# Patient Record
Sex: FEMALE | Race: WHITE | NOT HISPANIC OR LATINO | Employment: UNEMPLOYED | URBAN - METROPOLITAN AREA
[De-identification: names, ages, dates, MRNs, and addresses within clinical notes are randomized per-mention and may not be internally consistent; named-entity substitution may affect disease eponyms.]

---

## 2017-01-01 ENCOUNTER — PATIENT OUTREACH (OUTPATIENT)
Dept: HEALTH INFORMATION MANAGEMENT | Facility: OTHER | Age: 52
End: 2017-01-01

## 2017-01-01 ENCOUNTER — APPOINTMENT (OUTPATIENT)
Dept: RADIOLOGY | Facility: MEDICAL CENTER | Age: 52
DRG: 897 | End: 2017-01-01
Attending: EMERGENCY MEDICINE
Payer: MEDICAID

## 2017-01-01 ENCOUNTER — APPOINTMENT (OUTPATIENT)
Dept: RADIOLOGY | Facility: MEDICAL CENTER | Age: 52
DRG: 897 | End: 2017-01-01
Attending: HOSPITALIST
Payer: MEDICAID

## 2017-01-01 ENCOUNTER — HOSPITAL ENCOUNTER (INPATIENT)
Facility: MEDICAL CENTER | Age: 52
LOS: 3 days | DRG: 897 | End: 2017-11-19
Attending: EMERGENCY MEDICINE | Admitting: HOSPITALIST
Payer: MEDICAID

## 2017-01-01 ENCOUNTER — RESOLUTE PROFESSIONAL BILLING HOSPITAL PROF FEE (OUTPATIENT)
Dept: HOSPITALIST | Facility: MEDICAL CENTER | Age: 52
End: 2017-01-01
Payer: MEDICAID

## 2017-01-01 VITALS
RESPIRATION RATE: 18 BRPM | BODY MASS INDEX: 21.79 KG/M2 | WEIGHT: 127.65 LBS | OXYGEN SATURATION: 95 % | HEIGHT: 64 IN | HEART RATE: 90 BPM | TEMPERATURE: 97.9 F | DIASTOLIC BLOOD PRESSURE: 66 MMHG | SYSTOLIC BLOOD PRESSURE: 108 MMHG

## 2017-01-01 LAB
ABO GROUP BLD: NORMAL
ALBUMIN SERPL BCP-MCNC: 2.5 G/DL (ref 3.2–4.9)
ALBUMIN SERPL BCP-MCNC: 2.7 G/DL (ref 3.2–4.9)
ALBUMIN SERPL BCP-MCNC: 3.4 G/DL (ref 3.2–4.9)
ALBUMIN/GLOB SERPL: 0.9 G/DL
ALBUMIN/GLOB SERPL: 1 G/DL
ALBUMIN/GLOB SERPL: 1 G/DL
ALP SERPL-CCNC: 190 U/L (ref 30–99)
ALP SERPL-CCNC: 200 U/L (ref 30–99)
ALP SERPL-CCNC: 228 U/L (ref 30–99)
ALT SERPL-CCNC: 13 U/L (ref 2–50)
ALT SERPL-CCNC: 15 U/L (ref 2–50)
ALT SERPL-CCNC: 20 U/L (ref 2–50)
AMMONIA PLAS-SCNC: 49 UMOL/L (ref 11–45)
ANION GAP SERPL CALC-SCNC: 16 MMOL/L (ref 0–11.9)
ANION GAP SERPL CALC-SCNC: 24 MMOL/L (ref 0–11.9)
ANION GAP SERPL CALC-SCNC: 8 MMOL/L (ref 0–11.9)
ANION GAP SERPL CALC-SCNC: 9 MMOL/L (ref 0–11.9)
ANISOCYTOSIS BLD QL SMEAR: ABNORMAL
APTT PPP: 37.9 SEC (ref 24.7–36)
AST SERPL-CCNC: 70 U/L (ref 12–45)
AST SERPL-CCNC: 81 U/L (ref 12–45)
AST SERPL-CCNC: 93 U/L (ref 12–45)
BASOPHILS # BLD AUTO: 0.4 % (ref 0–1.8)
BASOPHILS # BLD AUTO: 0.5 % (ref 0–1.8)
BASOPHILS # BLD AUTO: 0.6 % (ref 0–1.8)
BASOPHILS # BLD AUTO: 1.7 % (ref 0–1.8)
BASOPHILS # BLD: 0.03 K/UL (ref 0–0.12)
BASOPHILS # BLD: 0.04 K/UL (ref 0–0.12)
BASOPHILS # BLD: 0.05 K/UL (ref 0–0.12)
BASOPHILS # BLD: 0.1 K/UL (ref 0–0.12)
BILIRUB SERPL-MCNC: 2.6 MG/DL (ref 0.1–1.5)
BILIRUB SERPL-MCNC: 2.9 MG/DL (ref 0.1–1.5)
BILIRUB SERPL-MCNC: 3.5 MG/DL (ref 0.1–1.5)
BLD GP AB SCN SERPL QL: NORMAL
BUN SERPL-MCNC: <3 MG/DL (ref 8–22)
CALCIUM SERPL-MCNC: 6.5 MG/DL (ref 8.5–10.5)
CALCIUM SERPL-MCNC: 7.3 MG/DL (ref 8.5–10.5)
CALCIUM SERPL-MCNC: 7.5 MG/DL (ref 8.5–10.5)
CALCIUM SERPL-MCNC: 8.5 MG/DL (ref 8.5–10.5)
CHLORIDE SERPL-SCNC: 100 MMOL/L (ref 96–112)
CHLORIDE SERPL-SCNC: 106 MMOL/L (ref 96–112)
CHLORIDE SERPL-SCNC: 89 MMOL/L (ref 96–112)
CHLORIDE SERPL-SCNC: 97 MMOL/L (ref 96–112)
CO2 SERPL-SCNC: 22 MMOL/L (ref 20–33)
CO2 SERPL-SCNC: 23 MMOL/L (ref 20–33)
CO2 SERPL-SCNC: 25 MMOL/L (ref 20–33)
CO2 SERPL-SCNC: 25 MMOL/L (ref 20–33)
CREAT SERPL-MCNC: 0.27 MG/DL (ref 0.5–1.4)
CREAT SERPL-MCNC: 0.29 MG/DL (ref 0.5–1.4)
CREAT SERPL-MCNC: 0.33 MG/DL (ref 0.5–1.4)
CREAT SERPL-MCNC: 0.44 MG/DL (ref 0.5–1.4)
EKG IMPRESSION: NORMAL
EOSINOPHIL # BLD AUTO: 0.03 K/UL (ref 0–0.51)
EOSINOPHIL # BLD AUTO: 0.04 K/UL (ref 0–0.51)
EOSINOPHIL # BLD AUTO: 0.04 K/UL (ref 0–0.51)
EOSINOPHIL # BLD AUTO: 0.05 K/UL (ref 0–0.51)
EOSINOPHIL NFR BLD: 0.4 % (ref 0–6.9)
EOSINOPHIL NFR BLD: 0.4 % (ref 0–6.9)
EOSINOPHIL NFR BLD: 0.6 % (ref 0–6.9)
EOSINOPHIL NFR BLD: 0.9 % (ref 0–6.9)
ERYTHROCYTE [DISTWIDTH] IN BLOOD BY AUTOMATED COUNT: 62.8 FL (ref 35.9–50)
ERYTHROCYTE [DISTWIDTH] IN BLOOD BY AUTOMATED COUNT: 63.4 FL (ref 35.9–50)
ERYTHROCYTE [DISTWIDTH] IN BLOOD BY AUTOMATED COUNT: 65.3 FL (ref 35.9–50)
ERYTHROCYTE [DISTWIDTH] IN BLOOD BY AUTOMATED COUNT: 67 FL (ref 35.9–50)
ETHANOL BLD-MCNC: 0 G/DL
FERRITIN SERPL-MCNC: >1500 NG/ML (ref 10–291)
FOLATE SERPL-MCNC: 3.7 NG/ML
GFR SERPL CREATININE-BSD FRML MDRD: >60 ML/MIN/1.73 M 2
GLOBULIN SER CALC-MCNC: 2.6 G/DL (ref 1.9–3.5)
GLOBULIN SER CALC-MCNC: 2.9 G/DL (ref 1.9–3.5)
GLOBULIN SER CALC-MCNC: 3.3 G/DL (ref 1.9–3.5)
GLUCOSE SERPL-MCNC: 101 MG/DL (ref 65–99)
GLUCOSE SERPL-MCNC: 110 MG/DL (ref 65–99)
GLUCOSE SERPL-MCNC: 96 MG/DL (ref 65–99)
GLUCOSE SERPL-MCNC: 98 MG/DL (ref 65–99)
HAV IGM SERPL QL IA: NEGATIVE
HBV CORE IGM SER QL: NEGATIVE
HBV SURFACE AG SER QL: NEGATIVE
HCT VFR BLD AUTO: 23 % (ref 37–47)
HCT VFR BLD AUTO: 23.8 % (ref 37–47)
HCT VFR BLD AUTO: 24.6 % (ref 37–47)
HCT VFR BLD AUTO: 27.2 % (ref 37–47)
HCV AB SER QL: NEGATIVE
HEMOCCULT SP1 STL QL: NEGATIVE
HEMOCCULT SP2 STL QL: NEGATIVE
HGB BLD-MCNC: 8.2 G/DL (ref 12–16)
HGB BLD-MCNC: 8.5 G/DL (ref 12–16)
HGB BLD-MCNC: 8.8 G/DL (ref 12–16)
HGB BLD-MCNC: 9.5 G/DL (ref 12–16)
HYPOCHROMIA BLD QL SMEAR: ABNORMAL
IMM GRANULOCYTES # BLD AUTO: 0.05 K/UL (ref 0–0.11)
IMM GRANULOCYTES # BLD AUTO: 0.06 K/UL (ref 0–0.11)
IMM GRANULOCYTES # BLD AUTO: 0.08 K/UL (ref 0–0.11)
IMM GRANULOCYTES NFR BLD AUTO: 0.7 % (ref 0–0.9)
IMM GRANULOCYTES NFR BLD AUTO: 0.9 % (ref 0–0.9)
IMM GRANULOCYTES NFR BLD AUTO: 0.9 % (ref 0–0.9)
INR PPP: 1.38 (ref 0.87–1.13)
INR PPP: 1.42 (ref 0.87–1.13)
IRON SATN MFR SERPL: 85 % (ref 15–55)
IRON SERPL-MCNC: 122 UG/DL (ref 40–170)
LG PLATELETS BLD QL SMEAR: NORMAL
LIPASE SERPL-CCNC: 108 U/L (ref 11–82)
LIPASE SERPL-CCNC: 89 U/L (ref 11–82)
LYMPHOCYTES # BLD AUTO: 0.44 K/UL (ref 1–4.8)
LYMPHOCYTES # BLD AUTO: 0.5 K/UL (ref 1–4.8)
LYMPHOCYTES # BLD AUTO: 0.52 K/UL (ref 1–4.8)
LYMPHOCYTES # BLD AUTO: 0.66 K/UL (ref 1–4.8)
LYMPHOCYTES NFR BLD: 5.4 % (ref 22–41)
LYMPHOCYTES NFR BLD: 6.2 % (ref 22–41)
LYMPHOCYTES NFR BLD: 8.7 % (ref 22–41)
LYMPHOCYTES NFR BLD: 9.7 % (ref 22–41)
MACROCYTES BLD QL SMEAR: ABNORMAL
MAGNESIUM SERPL-MCNC: 1 MG/DL (ref 1.5–2.5)
MAGNESIUM SERPL-MCNC: 1.7 MG/DL (ref 1.5–2.5)
MANUAL DIFF BLD: NORMAL
MCH RBC QN AUTO: 36.3 PG (ref 27–33)
MCH RBC QN AUTO: 36.4 PG (ref 27–33)
MCH RBC QN AUTO: 36.6 PG (ref 27–33)
MCH RBC QN AUTO: 36.7 PG (ref 27–33)
MCHC RBC AUTO-ENTMCNC: 34.9 G/DL (ref 33.6–35)
MCHC RBC AUTO-ENTMCNC: 35.7 G/DL (ref 33.6–35)
MCHC RBC AUTO-ENTMCNC: 35.7 G/DL (ref 33.6–35)
MCHC RBC AUTO-ENTMCNC: 35.8 G/DL (ref 33.6–35)
MCV RBC AUTO: 101.7 FL (ref 81.4–97.8)
MCV RBC AUTO: 102.5 FL (ref 81.4–97.8)
MCV RBC AUTO: 102.7 FL (ref 81.4–97.8)
MCV RBC AUTO: 104.2 FL (ref 81.4–97.8)
MONOCYTES # BLD AUTO: 0 K/UL (ref 0–0.85)
MONOCYTES # BLD AUTO: 0.36 K/UL (ref 0–0.85)
MONOCYTES # BLD AUTO: 0.48 K/UL (ref 0–0.85)
MONOCYTES # BLD AUTO: 0.5 K/UL (ref 0–0.85)
MONOCYTES NFR BLD AUTO: 0 % (ref 0–13.4)
MONOCYTES NFR BLD AUTO: 5.1 % (ref 0–13.4)
MONOCYTES NFR BLD AUTO: 5.4 % (ref 0–13.4)
MONOCYTES NFR BLD AUTO: 7 % (ref 0–13.4)
MORPHOLOGY BLD-IMP: NORMAL
NEUTROPHILS # BLD AUTO: 5.32 K/UL (ref 2–7.15)
NEUTROPHILS # BLD AUTO: 5.55 K/UL (ref 2–7.15)
NEUTROPHILS # BLD AUTO: 6.17 K/UL (ref 2–7.15)
NEUTROPHILS # BLD AUTO: 8.13 K/UL (ref 2–7.15)
NEUTROPHILS NFR BLD: 79.1 % (ref 44–72)
NEUTROPHILS NFR BLD: 81.2 % (ref 44–72)
NEUTROPHILS NFR BLD: 87.2 % (ref 44–72)
NEUTROPHILS NFR BLD: 87.4 % (ref 44–72)
NEUTS BAND NFR BLD MANUAL: 9.6 % (ref 0–10)
NRBC # BLD AUTO: 0 K/UL
NRBC BLD AUTO-RTO: 0 /100 WBC
PHOSPHATE SERPL-MCNC: <1 MG/DL (ref 2.5–4.5)
PLATELET # BLD AUTO: 111 K/UL (ref 164–446)
PLATELET # BLD AUTO: 117 K/UL (ref 164–446)
PLATELET # BLD AUTO: 128 K/UL (ref 164–446)
PLATELET # BLD AUTO: 144 K/UL (ref 164–446)
PLATELET BLD QL SMEAR: NORMAL
PMV BLD AUTO: 10.1 FL (ref 9–12.9)
PMV BLD AUTO: 10.4 FL (ref 9–12.9)
PMV BLD AUTO: 10.5 FL (ref 9–12.9)
PMV BLD AUTO: 10.7 FL (ref 9–12.9)
POIKILOCYTOSIS BLD QL SMEAR: NORMAL
POTASSIUM SERPL-SCNC: 2.5 MMOL/L (ref 3.6–5.5)
POTASSIUM SERPL-SCNC: 2.6 MMOL/L (ref 3.6–5.5)
POTASSIUM SERPL-SCNC: 3 MMOL/L (ref 3.6–5.5)
POTASSIUM SERPL-SCNC: 4 MMOL/L (ref 3.6–5.5)
PROT SERPL-MCNC: 5.1 G/DL (ref 6–8.2)
PROT SERPL-MCNC: 5.6 G/DL (ref 6–8.2)
PROT SERPL-MCNC: 6.7 G/DL (ref 6–8.2)
PROTHROMBIN TIME: 16.7 SEC (ref 12–14.6)
PROTHROMBIN TIME: 17 SEC (ref 12–14.6)
RBC # BLD AUTO: 2.24 M/UL (ref 4.2–5.4)
RBC # BLD AUTO: 2.34 M/UL (ref 4.2–5.4)
RBC # BLD AUTO: 2.4 M/UL (ref 4.2–5.4)
RBC # BLD AUTO: 2.61 M/UL (ref 4.2–5.4)
RBC BLD AUTO: PRESENT
RH BLD: NORMAL
SODIUM SERPL-SCNC: 134 MMOL/L (ref 135–145)
SODIUM SERPL-SCNC: 136 MMOL/L (ref 135–145)
SODIUM SERPL-SCNC: 136 MMOL/L (ref 135–145)
SODIUM SERPL-SCNC: 138 MMOL/L (ref 135–145)
TARGETS BLD QL SMEAR: NORMAL
TIBC SERPL-MCNC: 144 UG/DL (ref 250–450)
TOXIC GRANULES BLD QL SMEAR: SLIGHT
VIT B1 BLD-MCNC: 46 NMOL/L (ref 70–180)
VIT B12 SERPL-MCNC: 762 PG/ML (ref 211–911)
WBC # BLD AUTO: 6 K/UL (ref 4.8–10.8)
WBC # BLD AUTO: 6.8 K/UL (ref 4.8–10.8)
WBC # BLD AUTO: 7.1 K/UL (ref 4.8–10.8)
WBC # BLD AUTO: 9.3 K/UL (ref 4.8–10.8)

## 2017-01-01 PROCEDURE — 80053 COMPREHEN METABOLIC PANEL: CPT

## 2017-01-01 PROCEDURE — A9270 NON-COVERED ITEM OR SERVICE: HCPCS | Performed by: HOSPITALIST

## 2017-01-01 PROCEDURE — 700102 HCHG RX REV CODE 250 W/ 637 OVERRIDE(OP): Performed by: HOSPITALIST

## 2017-01-01 PROCEDURE — 700105 HCHG RX REV CODE 258: Performed by: HOSPITALIST

## 2017-01-01 PROCEDURE — 700105 HCHG RX REV CODE 258: Performed by: EMERGENCY MEDICINE

## 2017-01-01 PROCEDURE — 80048 BASIC METABOLIC PNL TOTAL CA: CPT

## 2017-01-01 PROCEDURE — 85007 BL SMEAR W/DIFF WBC COUNT: CPT

## 2017-01-01 PROCEDURE — 85027 COMPLETE CBC AUTOMATED: CPT

## 2017-01-01 PROCEDURE — 85025 COMPLETE CBC W/AUTO DIFF WBC: CPT

## 2017-01-01 PROCEDURE — A9270 NON-COVERED ITEM OR SERVICE: HCPCS | Performed by: FAMILY MEDICINE

## 2017-01-01 PROCEDURE — 96361 HYDRATE IV INFUSION ADD-ON: CPT

## 2017-01-01 PROCEDURE — 99232 SBSQ HOSP IP/OBS MODERATE 35: CPT | Performed by: HOSPITALIST

## 2017-01-01 PROCEDURE — 86850 RBC ANTIBODY SCREEN: CPT

## 2017-01-01 PROCEDURE — 700105 HCHG RX REV CODE 258: Performed by: FAMILY MEDICINE

## 2017-01-01 PROCEDURE — 85610 PROTHROMBIN TIME: CPT

## 2017-01-01 PROCEDURE — 83540 ASSAY OF IRON: CPT

## 2017-01-01 PROCEDURE — 99285 EMERGENCY DEPT VISIT HI MDM: CPT

## 2017-01-01 PROCEDURE — 86900 BLOOD TYPING SEROLOGIC ABO: CPT

## 2017-01-01 PROCEDURE — 83735 ASSAY OF MAGNESIUM: CPT

## 2017-01-01 PROCEDURE — 90686 IIV4 VACC NO PRSV 0.5 ML IM: CPT | Performed by: HOSPITALIST

## 2017-01-01 PROCEDURE — 76705 ECHO EXAM OF ABDOMEN: CPT

## 2017-01-01 PROCEDURE — 83690 ASSAY OF LIPASE: CPT

## 2017-01-01 PROCEDURE — 700101 HCHG RX REV CODE 250: Performed by: HOSPITALIST

## 2017-01-01 PROCEDURE — 770020 HCHG ROOM/CARE - TELE (206)

## 2017-01-01 PROCEDURE — 700102 HCHG RX REV CODE 250 W/ 637 OVERRIDE(OP): Performed by: FAMILY MEDICINE

## 2017-01-01 PROCEDURE — 700111 HCHG RX REV CODE 636 W/ 250 OVERRIDE (IP): Performed by: HOSPITALIST

## 2017-01-01 PROCEDURE — 82140 ASSAY OF AMMONIA: CPT

## 2017-01-01 PROCEDURE — 770006 HCHG ROOM/CARE - MED/SURG/GYN SEMI*

## 2017-01-01 PROCEDURE — 99239 HOSP IP/OBS DSCHRG MGMT >30: CPT | Performed by: HOSPITALIST

## 2017-01-01 PROCEDURE — 36415 COLL VENOUS BLD VENIPUNCTURE: CPT

## 2017-01-01 PROCEDURE — 3E0234Z INTRODUCTION OF SERUM, TOXOID AND VACCINE INTO MUSCLE, PERCUTANEOUS APPROACH: ICD-10-PCS | Performed by: HOSPITALIST

## 2017-01-01 PROCEDURE — 700111 HCHG RX REV CODE 636 W/ 250 OVERRIDE (IP): Performed by: EMERGENCY MEDICINE

## 2017-01-01 PROCEDURE — 93005 ELECTROCARDIOGRAM TRACING: CPT | Performed by: EMERGENCY MEDICINE

## 2017-01-01 PROCEDURE — 94760 N-INVAS EAR/PLS OXIMETRY 1: CPT

## 2017-01-01 PROCEDURE — 96375 TX/PRO/DX INJ NEW DRUG ADDON: CPT

## 2017-01-01 PROCEDURE — 82607 VITAMIN B-12: CPT

## 2017-01-01 PROCEDURE — 80307 DRUG TEST PRSMV CHEM ANLYZR: CPT

## 2017-01-01 PROCEDURE — 80074 ACUTE HEPATITIS PANEL: CPT

## 2017-01-01 PROCEDURE — 82728 ASSAY OF FERRITIN: CPT

## 2017-01-01 PROCEDURE — 86901 BLOOD TYPING SEROLOGIC RH(D): CPT

## 2017-01-01 PROCEDURE — 85730 THROMBOPLASTIN TIME PARTIAL: CPT

## 2017-01-01 PROCEDURE — 700101 HCHG RX REV CODE 250: Performed by: FAMILY MEDICINE

## 2017-01-01 PROCEDURE — 700111 HCHG RX REV CODE 636 W/ 250 OVERRIDE (IP): Performed by: FAMILY MEDICINE

## 2017-01-01 PROCEDURE — 96376 TX/PRO/DX INJ SAME DRUG ADON: CPT

## 2017-01-01 PROCEDURE — 84425 ASSAY OF VITAMIN B-1: CPT

## 2017-01-01 PROCEDURE — 82746 ASSAY OF FOLIC ACID SERUM: CPT

## 2017-01-01 PROCEDURE — 82270 OCCULT BLOOD FECES: CPT

## 2017-01-01 PROCEDURE — 71010 DX-CHEST-PORTABLE (1 VIEW): CPT

## 2017-01-01 PROCEDURE — 96365 THER/PROPH/DIAG IV INF INIT: CPT

## 2017-01-01 PROCEDURE — 84100 ASSAY OF PHOSPHORUS: CPT

## 2017-01-01 PROCEDURE — 83550 IRON BINDING TEST: CPT

## 2017-01-01 PROCEDURE — 99233 SBSQ HOSP IP/OBS HIGH 50: CPT | Performed by: HOSPITALIST

## 2017-01-01 PROCEDURE — 90471 IMMUNIZATION ADMIN: CPT

## 2017-01-01 PROCEDURE — 74181 MRI ABDOMEN W/O CONTRAST: CPT

## 2017-01-01 PROCEDURE — 99223 1ST HOSP IP/OBS HIGH 75: CPT | Performed by: HOSPITALIST

## 2017-01-01 PROCEDURE — HZ2ZZZZ DETOXIFICATION SERVICES FOR SUBSTANCE ABUSE TREATMENT: ICD-10-PCS | Performed by: HOSPITALIST

## 2017-01-01 RX ORDER — PHYTONADIONE 10 MG/ML
10 INJECTION, EMULSION INTRAMUSCULAR; INTRAVENOUS; SUBCUTANEOUS ONCE
Status: COMPLETED | OUTPATIENT
Start: 2017-01-01 | End: 2017-01-01

## 2017-01-01 RX ORDER — ONDANSETRON 2 MG/ML
4 INJECTION INTRAMUSCULAR; INTRAVENOUS EVERY 4 HOURS PRN
Status: DISCONTINUED | OUTPATIENT
Start: 2017-01-01 | End: 2017-01-01 | Stop reason: HOSPADM

## 2017-01-01 RX ORDER — AMOXICILLIN 250 MG
2 CAPSULE ORAL 2 TIMES DAILY
Status: DISCONTINUED | OUTPATIENT
Start: 2017-01-01 | End: 2017-01-01 | Stop reason: HOSPADM

## 2017-01-01 RX ORDER — SODIUM CHLORIDE 9 MG/ML
1000 INJECTION, SOLUTION INTRAVENOUS ONCE
Status: COMPLETED | OUTPATIENT
Start: 2017-01-01 | End: 2017-01-01

## 2017-01-01 RX ORDER — OXYCODONE HYDROCHLORIDE 5 MG/1
2.5 TABLET ORAL
Status: DISCONTINUED | OUTPATIENT
Start: 2017-01-01 | End: 2017-01-01 | Stop reason: HOSPADM

## 2017-01-01 RX ORDER — PROMETHAZINE HYDROCHLORIDE 25 MG/1
12.5-25 TABLET ORAL EVERY 4 HOURS PRN
Status: DISCONTINUED | OUTPATIENT
Start: 2017-01-01 | End: 2017-01-01 | Stop reason: HOSPADM

## 2017-01-01 RX ORDER — PROMETHAZINE HYDROCHLORIDE 25 MG/1
12.5-25 SUPPOSITORY RECTAL EVERY 4 HOURS PRN
Status: DISCONTINUED | OUTPATIENT
Start: 2017-01-01 | End: 2017-01-01 | Stop reason: HOSPADM

## 2017-01-01 RX ORDER — SODIUM CHLORIDE AND POTASSIUM CHLORIDE 150; 900 MG/100ML; MG/100ML
INJECTION, SOLUTION INTRAVENOUS CONTINUOUS
Status: DISCONTINUED | OUTPATIENT
Start: 2017-01-01 | End: 2017-01-01 | Stop reason: HOSPADM

## 2017-01-01 RX ORDER — POTASSIUM CHLORIDE 20 MEQ/1
40 TABLET, EXTENDED RELEASE ORAL ONCE
Status: COMPLETED | OUTPATIENT
Start: 2017-01-01 | End: 2017-01-01

## 2017-01-01 RX ORDER — HEPARIN SODIUM 5000 [USP'U]/ML
5000 INJECTION, SOLUTION INTRAVENOUS; SUBCUTANEOUS EVERY 8 HOURS
Status: DISCONTINUED | OUTPATIENT
Start: 2017-01-01 | End: 2017-01-01 | Stop reason: HOSPADM

## 2017-01-01 RX ORDER — ONDANSETRON 4 MG/1
4 TABLET, ORALLY DISINTEGRATING ORAL EVERY 4 HOURS PRN
Status: DISCONTINUED | OUTPATIENT
Start: 2017-01-01 | End: 2017-01-01 | Stop reason: HOSPADM

## 2017-01-01 RX ORDER — POTASSIUM CHLORIDE 7.45 MG/ML
10 INJECTION INTRAVENOUS ONCE
Status: COMPLETED | OUTPATIENT
Start: 2017-01-01 | End: 2017-01-01

## 2017-01-01 RX ORDER — PROPRANOLOL HYDROCHLORIDE 10 MG/1
10 TABLET ORAL EVERY 8 HOURS
Status: DISCONTINUED | OUTPATIENT
Start: 2017-01-01 | End: 2017-01-01 | Stop reason: HOSPADM

## 2017-01-01 RX ORDER — MAGNESIUM SULFATE HEPTAHYDRATE 40 MG/ML
4 INJECTION, SOLUTION INTRAVENOUS ONCE
Status: COMPLETED | OUTPATIENT
Start: 2017-01-01 | End: 2017-01-01

## 2017-01-01 RX ORDER — BISACODYL 10 MG
10 SUPPOSITORY, RECTAL RECTAL
Status: DISCONTINUED | OUTPATIENT
Start: 2017-01-01 | End: 2017-01-01 | Stop reason: HOSPADM

## 2017-01-01 RX ORDER — ONDANSETRON 2 MG/ML
4 INJECTION INTRAMUSCULAR; INTRAVENOUS ONCE
Status: COMPLETED | OUTPATIENT
Start: 2017-01-01 | End: 2017-01-01

## 2017-01-01 RX ORDER — OMEPRAZOLE 20 MG/1
20 CAPSULE, DELAYED RELEASE ORAL DAILY
Status: DISCONTINUED | OUTPATIENT
Start: 2017-01-01 | End: 2017-01-01 | Stop reason: HOSPADM

## 2017-01-01 RX ORDER — POLYETHYLENE GLYCOL 3350 17 G/17G
1 POWDER, FOR SOLUTION ORAL
Status: DISCONTINUED | OUTPATIENT
Start: 2017-01-01 | End: 2017-01-01 | Stop reason: HOSPADM

## 2017-01-01 RX ORDER — ACETAMINOPHEN 325 MG/1
650 TABLET ORAL EVERY 6 HOURS PRN
Status: DISCONTINUED | OUTPATIENT
Start: 2017-01-01 | End: 2017-01-01 | Stop reason: HOSPADM

## 2017-01-01 RX ORDER — CALCIUM GLUCONATE 94 MG/ML
1 INJECTION, SOLUTION INTRAVENOUS ONCE
Status: DISCONTINUED | OUTPATIENT
Start: 2017-01-01 | End: 2017-01-01

## 2017-01-01 RX ORDER — SUCRALFATE ORAL 1 G/10ML
1 SUSPENSION ORAL EVERY 6 HOURS
Status: DISCONTINUED | OUTPATIENT
Start: 2017-01-01 | End: 2017-01-01 | Stop reason: HOSPADM

## 2017-01-01 RX ORDER — MORPHINE SULFATE 4 MG/ML
2 INJECTION, SOLUTION INTRAMUSCULAR; INTRAVENOUS
Status: DISCONTINUED | OUTPATIENT
Start: 2017-01-01 | End: 2017-01-01 | Stop reason: HOSPADM

## 2017-01-01 RX ORDER — LABETALOL HYDROCHLORIDE 5 MG/ML
10 INJECTION, SOLUTION INTRAVENOUS EVERY 4 HOURS PRN
Status: DISCONTINUED | OUTPATIENT
Start: 2017-01-01 | End: 2017-01-01 | Stop reason: HOSPADM

## 2017-01-01 RX ORDER — POTASSIUM CHLORIDE 20 MEQ/1
40 TABLET, EXTENDED RELEASE ORAL DAILY
Status: DISCONTINUED | OUTPATIENT
Start: 2017-01-01 | End: 2017-01-01 | Stop reason: HOSPADM

## 2017-01-01 RX ORDER — OXYCODONE HYDROCHLORIDE 5 MG/1
5 TABLET ORAL
Status: DISCONTINUED | OUTPATIENT
Start: 2017-01-01 | End: 2017-01-01 | Stop reason: HOSPADM

## 2017-01-01 RX ORDER — SPIRONOLACTONE 50 MG/1
50 TABLET, FILM COATED ORAL
Status: DISCONTINUED | OUTPATIENT
Start: 2017-01-01 | End: 2017-01-01 | Stop reason: HOSPADM

## 2017-01-01 RX ADMIN — MAGNESIUM SULFATE IN WATER 4 G: 40 INJECTION, SOLUTION INTRAVENOUS at 11:41

## 2017-01-01 RX ADMIN — OMEPRAZOLE 20 MG: 20 CAPSULE, DELAYED RELEASE ORAL at 09:22

## 2017-01-01 RX ADMIN — POTASSIUM CHLORIDE AND SODIUM CHLORIDE: 900; 150 INJECTION, SOLUTION INTRAVENOUS at 00:44

## 2017-01-01 RX ADMIN — ONDANSETRON 4 MG: 2 INJECTION INTRAMUSCULAR; INTRAVENOUS at 08:10

## 2017-01-01 RX ADMIN — OMEPRAZOLE 20 MG: 20 CAPSULE, DELAYED RELEASE ORAL at 08:08

## 2017-01-01 RX ADMIN — SUCRALFATE 1 G: 1 SUSPENSION ORAL at 18:09

## 2017-01-01 RX ADMIN — POTASSIUM CHLORIDE AND SODIUM CHLORIDE: 900; 150 INJECTION, SOLUTION INTRAVENOUS at 09:03

## 2017-01-01 RX ADMIN — HEPARIN SODIUM 5000 UNITS: 5000 INJECTION, SOLUTION INTRAVENOUS; SUBCUTANEOUS at 15:14

## 2017-01-01 RX ADMIN — SUCRALFATE 1 G: 1 SUSPENSION ORAL at 06:16

## 2017-01-01 RX ADMIN — SUCRALFATE 1 G: 1 SUSPENSION ORAL at 00:16

## 2017-01-01 RX ADMIN — INFLUENZA A VIRUS A/MICHIGAN/45/2015 X-275 (H1N1) ANTIGEN (FORMALDEHYDE INACTIVATED), INFLUENZA A VIRUS A/HONG KONG/4801/2014 X-263B (H3N2) ANTIGEN (FORMALDEHYDE INACTIVATED), INFLUENZA B VIRUS B/PHUKET/3073/2013 ANTIGEN (FORMALDEHYDE INACTIVATED), AND INFLUENZA B VIRUS B/BRISBANE/60/2008 ANTIGEN (FORMALDEHYDE INACTIVATED) 0.5 ML: 15; 15; 15; 15 INJECTION, SUSPENSION INTRAMUSCULAR at 15:18

## 2017-01-01 RX ADMIN — HEPARIN SODIUM 5000 UNITS: 5000 INJECTION, SOLUTION INTRAVENOUS; SUBCUTANEOUS at 06:16

## 2017-01-01 RX ADMIN — ONDANSETRON 4 MG: 4 TABLET, ORALLY DISINTEGRATING ORAL at 03:40

## 2017-01-01 RX ADMIN — SODIUM CHLORIDE 1000 ML: 9 INJECTION, SOLUTION INTRAVENOUS at 17:28

## 2017-01-01 RX ADMIN — SUCRALFATE 1 G: 1 SUSPENSION ORAL at 23:24

## 2017-01-01 RX ADMIN — POTASSIUM CHLORIDE AND SODIUM CHLORIDE: 900; 150 INJECTION, SOLUTION INTRAVENOUS at 02:31

## 2017-01-01 RX ADMIN — HEPARIN SODIUM 5000 UNITS: 5000 INJECTION, SOLUTION INTRAVENOUS; SUBCUTANEOUS at 14:27

## 2017-01-01 RX ADMIN — POTASSIUM CHLORIDE 40 MEQ: 1500 TABLET, EXTENDED RELEASE ORAL at 08:08

## 2017-01-01 RX ADMIN — HEPARIN SODIUM 5000 UNITS: 5000 INJECTION, SOLUTION INTRAVENOUS; SUBCUTANEOUS at 05:35

## 2017-01-01 RX ADMIN — POTASSIUM CHLORIDE AND SODIUM CHLORIDE: 900; 150 INJECTION, SOLUTION INTRAVENOUS at 18:28

## 2017-01-01 RX ADMIN — CALCIUM GLUCONATE 1 G: 94 INJECTION, SOLUTION INTRAVENOUS at 05:01

## 2017-01-01 RX ADMIN — HEPARIN SODIUM 5000 UNITS: 5000 INJECTION, SOLUTION INTRAVENOUS; SUBCUTANEOUS at 21:28

## 2017-01-01 RX ADMIN — POTASSIUM PHOSPHATE, MONOBASIC AND POTASSIUM PHOSPHATE, DIBASIC 30 MMOL: 224; 236 INJECTION, SOLUTION INTRAVENOUS at 15:30

## 2017-01-01 RX ADMIN — POTASSIUM PHOSPHATE, MONOBASIC AND POTASSIUM PHOSPHATE, DIBASIC 30 MMOL: 224; 236 INJECTION, SOLUTION INTRAVENOUS at 06:16

## 2017-01-01 RX ADMIN — OMEPRAZOLE 20 MG: 20 CAPSULE, DELAYED RELEASE ORAL at 19:40

## 2017-01-01 RX ADMIN — SUCRALFATE 1 G: 1 SUSPENSION ORAL at 12:44

## 2017-01-01 RX ADMIN — POTASSIUM CHLORIDE 10 MEQ: 10 INJECTION, SOLUTION INTRAVENOUS at 14:15

## 2017-01-01 RX ADMIN — SUCRALFATE 1 G: 1 SUSPENSION ORAL at 15:17

## 2017-01-01 RX ADMIN — POTASSIUM CHLORIDE 40 MEQ: 1500 TABLET, EXTENDED RELEASE ORAL at 08:11

## 2017-01-01 RX ADMIN — POTASSIUM CHLORIDE AND SODIUM CHLORIDE: 900; 150 INJECTION, SOLUTION INTRAVENOUS at 02:40

## 2017-01-01 RX ADMIN — ONDANSETRON 4 MG: 2 INJECTION INTRAMUSCULAR; INTRAVENOUS at 21:02

## 2017-01-01 RX ADMIN — POTASSIUM CHLORIDE 40 MEQ: 1500 TABLET, EXTENDED RELEASE ORAL at 05:35

## 2017-01-01 RX ADMIN — POTASSIUM CHLORIDE 40 MEQ: 1500 TABLET, EXTENDED RELEASE ORAL at 15:17

## 2017-01-01 RX ADMIN — SUCRALFATE 1 G: 1 SUSPENSION ORAL at 19:40

## 2017-01-01 RX ADMIN — SODIUM CHLORIDE 1000 ML: 9 INJECTION, SOLUTION INTRAVENOUS at 12:36

## 2017-01-01 RX ADMIN — HEPARIN SODIUM 5000 UNITS: 5000 INJECTION, SOLUTION INTRAVENOUS; SUBCUTANEOUS at 19:40

## 2017-01-01 RX ADMIN — STANDARDIZED SENNA CONCENTRATE AND DOCUSATE SODIUM 2 TABLET: 8.6; 5 TABLET, FILM COATED ORAL at 22:21

## 2017-01-01 RX ADMIN — PHYTONADIONE 10 MG: 10 INJECTION, EMULSION INTRAMUSCULAR; INTRAVENOUS; SUBCUTANEOUS at 19:40

## 2017-01-01 RX ADMIN — ONDANSETRON 4 MG: 4 TABLET, ORALLY DISINTEGRATING ORAL at 14:27

## 2017-01-01 RX ADMIN — HEPARIN SODIUM 5000 UNITS: 5000 INJECTION, SOLUTION INTRAVENOUS; SUBCUTANEOUS at 22:22

## 2017-01-01 RX ADMIN — SUCRALFATE 1 G: 1 SUSPENSION ORAL at 05:02

## 2017-01-01 RX ADMIN — POTASSIUM CHLORIDE AND SODIUM CHLORIDE: 900; 150 INJECTION, SOLUTION INTRAVENOUS at 16:02

## 2017-01-01 RX ADMIN — SPIRONOLACTONE 50 MG: 25 TABLET, FILM COATED ORAL at 19:40

## 2017-01-01 RX ADMIN — SPIRONOLACTONE 50 MG: 25 TABLET, FILM COATED ORAL at 08:08

## 2017-01-01 RX ADMIN — POTASSIUM CHLORIDE AND SODIUM CHLORIDE: 900; 150 INJECTION, SOLUTION INTRAVENOUS at 16:18

## 2017-01-01 RX ADMIN — ONDANSETRON 4 MG: 4 TABLET, ORALLY DISINTEGRATING ORAL at 07:40

## 2017-01-01 RX ADMIN — ONDANSETRON 4 MG: 2 INJECTION INTRAMUSCULAR; INTRAVENOUS at 12:37

## 2017-01-01 ASSESSMENT — LIFESTYLE VARIABLES
CONSUMPTION TOTAL: POSITIVE
EVER_SMOKED: YES
HAVE YOU EVER FELT YOU SHOULD CUT DOWN ON YOUR DRINKING: YES
TREMOR: NO TREMOR
HAVE PEOPLE ANNOYED YOU BY CRITICIZING YOUR DRINKING: NO
EVER_SMOKED: NEVER
EVER FELT BAD OR GUILTY ABOUT YOUR DRINKING: YES
AGITATION: NORMAL ACTIVITY
EVER HAD A DRINK FIRST THING IN THE MORNING TO STEADY YOUR NERVES TO GET RID OF A HANGOVER: YES
TOTAL SCORE: 3
ORIENTATION AND CLOUDING OF SENSORIUM: ORIENTED AND CAN DO SERIAL ADDITIONS
NAUSEA AND VOMITING: MILD NAUSEA WITH NO VOMITING
AVERAGE NUMBER OF DAYS PER WEEK YOU HAVE A DRINK CONTAINING ALCOHOL: 7
SUBSTANCE_ABUSE: 1
ANXIETY: MILDLY ANXIOUS
ON A TYPICAL DAY WHEN YOU DRINK ALCOHOL HOW MANY DRINKS DO YOU HAVE: 5
HEADACHE, FULLNESS IN HEAD: VERY MILD
TOTAL SCORE: 3
HOW MANY TIMES IN THE PAST YEAR HAVE YOU HAD 5 OR MORE DRINKS IN A DAY: 5
DO YOU DRINK ALCOHOL: YES
VISUAL DISTURBANCES: NOT PRESENT
PAROXYSMAL SWEATS: NO SWEAT VISIBLE
AUDITORY DISTURBANCES: NOT PRESENT
ALCOHOL_USE: YES

## 2017-01-01 ASSESSMENT — COGNITIVE AND FUNCTIONAL STATUS - GENERAL
WALKING IN HOSPITAL ROOM: A LITTLE
MOBILITY SCORE: 22
TOILETING: A LITTLE
DAILY ACTIVITIY SCORE: 24
DAILY ACTIVITIY SCORE: 23
WALKING IN HOSPITAL ROOM: A LITTLE
SUGGESTED CMS G CODE MODIFIER DAILY ACTIVITY: CI
CLIMB 3 TO 5 STEPS WITH RAILING: A LITTLE
SUGGESTED CMS G CODE MODIFIER MOBILITY: CJ
SUGGESTED CMS G CODE MODIFIER DAILY ACTIVITY: CH
MOBILITY SCORE: 22
CLIMB 3 TO 5 STEPS WITH RAILING: A LITTLE
SUGGESTED CMS G CODE MODIFIER MOBILITY: CJ

## 2017-01-01 ASSESSMENT — ENCOUNTER SYMPTOMS
CHILLS: 0
RESPIRATORY NEGATIVE: 1
BLURRED VISION: 0
SORE THROAT: 0
NAUSEA: 0
VOMITING: 1
FEVER: 0
MUSCULOSKELETAL NEGATIVE: 1
VOMITING: 1
EYES NEGATIVE: 1
ABDOMINAL PAIN: 1
FEVER: 0
INSOMNIA: 0
LOSS OF CONSCIOUSNESS: 0
SPUTUM PRODUCTION: 0
VOMITING: 0
DIZZINESS: 0
MYALGIAS: 0
WEAKNESS: 1
PALPITATIONS: 0
COUGH: 1
SHORTNESS OF BREATH: 0
FOCAL WEAKNESS: 0
NERVOUS/ANXIOUS: 0
NECK PAIN: 0
SEIZURES: 0
BACK PAIN: 0
DEPRESSION: 0
DIARRHEA: 0
NAUSEA: 1
CARDIOVASCULAR NEGATIVE: 1
POLYDIPSIA: 0
COUGH: 0
HEADACHES: 0
HEARTBURN: 0
WEIGHT LOSS: 0
HEADACHES: 0
CHILLS: 0
CHILLS: 0
CONSTIPATION: 0
NAUSEA: 1
ABDOMINAL PAIN: 0
NERVOUS/ANXIOUS: 1
BLURRED VISION: 0
PALPITATIONS: 0
DIARRHEA: 1
EYE PAIN: 0
ABDOMINAL PAIN: 1
SPUTUM PRODUCTION: 0
SHORTNESS OF BREATH: 0
DOUBLE VISION: 0
COUGH: 0
FLANK PAIN: 0
TINGLING: 0
FEVER: 0
WEAKNESS: 1

## 2017-01-01 ASSESSMENT — PATIENT HEALTH QUESTIONNAIRE - PHQ9
9. THOUGHTS THAT YOU WOULD BE BETTER OFF DEAD, OR OF HURTING YOURSELF: NOT AT ALL
5. POOR APPETITE OR OVEREATING: SEVERAL DAYS
4. FEELING TIRED OR HAVING LITTLE ENERGY: SEVERAL DAYS
3. TROUBLE FALLING OR STAYING ASLEEP OR SLEEPING TOO MUCH: NOT AT ALL
7. TROUBLE CONCENTRATING ON THINGS, SUCH AS READING THE NEWSPAPER OR WATCHING TELEVISION: NOT AT ALL
6. FEELING BAD ABOUT YOURSELF - OR THAT YOU ARE A FAILURE OR HAVE LET YOURSELF OR YOUR FAMILY DOWN: NOT AL ALL
SUM OF ALL RESPONSES TO PHQ9 QUESTIONS 1 AND 2: 1
2. FEELING DOWN, DEPRESSED, IRRITABLE, OR HOPELESS: SEVERAL DAYS
SUM OF ALL RESPONSES TO PHQ QUESTIONS 1-9: 3
1. LITTLE INTEREST OR PLEASURE IN DOING THINGS: NOT AT ALL
8. MOVING OR SPEAKING SO SLOWLY THAT OTHER PEOPLE COULD HAVE NOTICED. OR THE OPPOSITE, BEING SO FIGETY OR RESTLESS THAT YOU HAVE BEEN MOVING AROUND A LOT MORE THAN USUAL: NOT AT ALL

## 2017-01-01 ASSESSMENT — PAIN SCALES - GENERAL
PAINLEVEL_OUTOF10: 0
PAINLEVEL_OUTOF10: ASSUMED PAIN PRESENT

## 2017-01-01 ASSESSMENT — COPD QUESTIONNAIRES
DO YOU EVER COUGH UP ANY MUCUS OR PHLEGM?: NO/ONLY WITH OCCASIONAL COLDS OR INFECTIONS
DURING THE PAST 4 WEEKS HOW MUCH DID YOU FEEL SHORT OF BREATH: NONE/LITTLE OF THE TIME
COPD SCREENING SCORE: 1
HAVE YOU SMOKED AT LEAST 100 CIGARETTES IN YOUR ENTIRE LIFE: NO/DON'T KNOW

## 2017-11-16 PROBLEM — F10.20 ALCOHOLISM (HCC): Status: ACTIVE | Noted: 2017-01-01

## 2017-11-16 PROBLEM — K74.60 CIRRHOSIS (HCC): Status: ACTIVE | Noted: 2017-01-01

## 2017-11-16 PROBLEM — K75.9 HEPATITIS: Status: ACTIVE | Noted: 2017-01-01

## 2017-11-16 PROBLEM — R10.9 ABDOMINAL PAIN: Status: ACTIVE | Noted: 2017-01-01

## 2017-11-16 PROBLEM — E87.6 HYPOKALEMIA: Status: ACTIVE | Noted: 2017-01-01

## 2017-11-16 NOTE — ED NOTES
Ryan from Lab called with critical result of K 2.5 at 1329. Critical lab result read back to Ryan Duncan notified of critical lab result at 1329.  Critical lab result read back by Dr. Morton

## 2017-11-16 NOTE — ED NOTES
Pt BIB [facility] with c/c of N/V and blood in stool for approx 1.5 weeks. Pt reporting she normally drinks 1/5 of wiskey a day. Pt reporting last drink was about 1 week ago. Pt stating unable to hold for any food/liquids. Pt also stating black stool for 1.5-2 weeks. Abd distention noted and jaundice noted.

## 2017-11-16 NOTE — ED PROVIDER NOTES
ED Provider Note    Addendum: I have independently evaluated the patient, discussed the patient with the resident, I have evaluated the laboratory and radiological findings. At this point, I do agree with the resident's input, note. The patient has been admitted to Dr. Severino for further evaluation and management, MRCP, liver failure. The patient's hemodynamically stable upon admission to the hospital.

## 2017-11-16 NOTE — H&P
Hospital Medicine History and Physical    Date of Service  11/16/2017    Chief Complaint  Chief Complaint   Patient presents with   • Bloody Stools   • N/V       History of Presenting Illness  52 y.o. female who presented 11/16/2017 with nausea/vomiting and abdominal pain. She has been a heavy drinker for the past several years to the tune of about a 5th per day of whiskey. She says she stopped drinking about a week ago and denies withdrawal. Her pain is diffuse and nonradiating. Her vonitus is not coffee ground. She does endorse dark stools on occasion but is guiac negative in the ED>   Primary Care Physician  Bety Flores M.D.    Consultants  none    Code Status  full    Review of Systems  Review of Systems   Constitutional: Negative for chills and fever.   HENT: Negative for sore throat.    Eyes: Negative for blurred vision and pain.   Respiratory: Negative for cough and shortness of breath.    Cardiovascular: Negative for chest pain and palpitations.   Gastrointestinal: Positive for abdominal pain, nausea and vomiting.   Genitourinary: Negative for dysuria and urgency.   Musculoskeletal: Negative for back pain and neck pain.   Skin: Negative for itching and rash.   Neurological: Negative for dizziness, tingling and headaches.   Psychiatric/Behavioral: Negative for depression. The patient does not have insomnia.    All other systems reviewed and are negative.       Past Medical History  No past medical history on file.    Surgical History  No past surgical history on file.    Medications  No current facility-administered medications on file prior to encounter.      No current outpatient prescriptions on file prior to encounter.       Family History  History reviewed. No pertinent family history.    Social History  Social History   Substance Use Topics   • Smoking status: Current Every Day Smoker   • Smokeless tobacco: Not on file   • Alcohol use Yes      Comment: 1/5 whiskey a day       Allergies  Allergies    Allergen Reactions   • Pcn [Penicillins] Rash              Physical Exam  Laboratory   Hemodynamics  Temp (24hrs), Av.1 °C (98.8 °F), Min:37.1 °C (98.8 °F), Max:37.1 °C (98.8 °F)   Temperature: 37.1 °C (98.8 °F)  Pulse  Av.8  Min: 94  Max: 106 Heart Rate (Monitored): (!) 104  Blood Pressure: 105/57, NIBP: (!) 86/60      Respiratory      Respiration: 20, Pulse Oximetry: 91 %             Physical Exam   Constitutional: She is oriented to person, place, and time. She appears well-developed and well-nourished. No distress.   HENT:   Right Ear: External ear normal.   Left Ear: External ear normal.   Nose: Nose normal.   Eyes: Conjunctivae are normal. Right eye exhibits no discharge. Left eye exhibits no discharge.   Neck: No JVD present.   Cardiovascular: Regular rhythm and normal heart sounds.    No murmur heard.  Pulmonary/Chest: Effort normal and breath sounds normal. No stridor. No respiratory distress. She has no wheezes. She has no rales.   Abdominal: Soft. Bowel sounds are normal. She exhibits no distension. There is no tenderness.   Musculoskeletal: She exhibits no edema or tenderness.   Neurological: She is alert and oriented to person, place, and time.   Skin: Skin is warm and dry. She is not diaphoretic. No erythema.   Psychiatric: She has a normal mood and affect. Her behavior is normal.   Nursing note and vitals reviewed.      Recent Labs      17   1145   WBC  9.3   RBC  2.61*   HEMOGLOBIN  9.5*   HEMATOCRIT  27.2*   MCV  104.2*   MCH  36.4*   MCHC  34.9   RDW  63.4*   PLATELETCT  144*   MPV  10.1     Recent Labs      17   1145   SODIUM  136   POTASSIUM  2.5*   CHLORIDE  89*   CO2  23   GLUCOSE  101*   BUN  <3*   CREATININE  0.44*   CALCIUM  8.5     Recent Labs      17   1145   ALTSGPT  20   ASTSGOT  93*   ALKPHOSPHAT  228*   TBILIRUBIN  3.5*   LIPASE  89*   GLUCOSE  101*     Recent Labs      17   1145   APTT  37.9*   INR  1.38*             No results found for:  TROPONINI  Urinalysis:  No results found for: SPECGRAVITY, GLUCOSEUR, KETONES, NITRITE, WBCURINE, RBCURINE, BACTERIA, EPITHELCELL     Imaging  Reviewed.    Assessment/Plan     I anticipate this patient will require at least two midnights for appropriate medical management, necessitating inpatient admission.    Cirrhosis (CMS-HCC)   Assessment & Plan    Suspected dx. INR is up. abd distended. etoh hx. Workup as above.         Alcoholism (CMS-HCC)   Assessment & Plan    ciwa protocol        Hepatitis   Assessment & Plan    Suspect alcoholic hepatitis. Check hep panel. Mrcp. Not a steroid candidate.         Hypokalemia   Assessment & Plan    Trend and replace.         Abdominal pain   Assessment & Plan    Suspect from alcoholic gastritis/hepatitis.   Workup for cbd stone as well.   Pain control.             VTE prophylaxis: heparin.

## 2017-11-16 NOTE — ED PROVIDER NOTES
CHIEF COMPLAINT  Chief Complaint   Patient presents with   • Bloody Stools   • N/V       HPI (1,4)  Felicia Machado is a 52 y.o. Female presented to the ED [facility] with a past medical history of alcohol abuse, hypothyroidism, Seizures one year ago complains of nausea, vomiting , black stools and abdominal discomfort.  Abdominal discomfort:  She stated she has abdominal discomfort and pain since two weeks located in the left side of her abdomen , dull type, constant, aggravated on lying on the same side associated with nausea, non bloody emesis, black stools 2-3 times a day. She also has cough non productive of sputum, weakness generalized.   She stated she drinks 1/5th of whiskey everyday until 10 days ago when she started to have nausea and vomiting.    Denies fevers, chills, weight loss, constipation, dysuria  REVIEW OF SYSTEMS(2/10)  Review of Systems   Constitutional: Positive for malaise/fatigue. Negative for chills, fever and weight loss.   HENT: Negative for congestion, ear discharge, ear pain, hearing loss, nosebleeds, sore throat and tinnitus.    Eyes: Negative for blurred vision.   Respiratory: Positive for cough. Negative for sputum production and shortness of breath.    Cardiovascular: Negative for chest pain, palpitations and leg swelling.   Gastrointestinal: Positive for abdominal pain, diarrhea, melena, nausea and vomiting. Negative for heartburn.   Genitourinary: Negative for dysuria, flank pain and hematuria.   Musculoskeletal: Negative for back pain, joint pain, myalgias and neck pain.   Skin: Negative for itching and rash.   Neurological: Positive for weakness. Negative for dizziness, focal weakness, seizures and loss of consciousness.   Endo/Heme/Allergies: Negative for environmental allergies and polydipsia.   Psychiatric/Behavioral: The patient is not nervous/anxious.          PAST MEDICAL HISTORY(PFS1,2)  Hypothyroidism  Seizures one year ago    FAMILY HISTORY  No pertinent family history  "    SOCIAL HISTORY  Social History   Substance Use Topics   • Smoking status: Current Every Day Smoker   • Smokeless tobacco: Not on file   • Alcohol use Yes      Comment: 1/5 whiskey a day     History   Drug use: Unknown       SURGICAL HISTORY  No past surgical history  CURRENT MEDICATIONS  Home Medications     Reviewed by Adarsh Beltre (Pharmacy Tech) on 11/16/17 at 1321  Med List Status: Complete   Medication Last Dose Status        Patient Marvel Taking any Medications                       ALLERGIES  Allergies   Allergen Reactions   • Pcn [Penicillins] Rash             PHYSICAL EXAM (2,8)  VITAL SIGNS: /57   Pulse (!) 101   Temp 37.1 °C (98.8 °F)   Resp 18   Ht 1.626 m (5' 4\")   Wt 59 kg (130 lb)   SpO2 91%   BMI 22.31 kg/m²    Physical Exam   Constitutional: She is oriented to person, place, and time. She appears dehydrated. No distress.   HENT:   Head: Normocephalic and atraumatic.   Nose: Nose normal.   Mouth/Throat: Mucous membranes are dry.   Mucus membranes are yellowish discoloration   Eyes: Conjunctivae and EOM are normal. Pupils are equal, round, and reactive to light. Scleral icterus is present.   Neck: Normal range of motion. Neck supple. No thyromegaly present.   Cardiovascular: Regular rhythm and intact distal pulses.  Tachycardia present.    Pulmonary/Chest: Effort normal. She has rales (at the bases bilaterally).   Abdominal: Bowel sounds are normal. She exhibits distension. She exhibits no pulsatile liver, no abdominal bruit and no pulsatile midline mass. There is hepatomegaly. There is no tenderness. There is no rebound and no guarding.   Genitourinary: Rectum normal. Rectal exam shows no external hemorrhoid, no fissure, no mass, no tenderness and guaiac negative stool.   Musculoskeletal: Normal range of motion. She exhibits no edema.   Lymphadenopathy:     She has no cervical adenopathy.   Neurological: She is alert and oriented to person, place, and time. She has normal " reflexes. No cranial nerve deficit. Coordination normal.   Skin: Skin is warm and dry.   Psychiatric: Mood, memory, affect and judgment normal.   Nursing note and vitals reviewed.      DIFFERENTIAL DIAGNOSIS:  The differential diagnosis includes the following but is not limited to Alcoholic hepatitis, cirrhosis, pancreatitis, SBP, cholecystitis        EKG  Sinus rhythm with HR 94 Qtc 486 with no acute ischemic changes     RADIOLOGY/PROCEDURES  DX-CHEST-PORTABLE (1 VIEW)   Final Result      Linear atelectasis within the left lung base.      US-LIVER AND BILIARY TREE   Final Result      Enlarged, heterogeneous and echogenic appearance of the liver can be seen in hepatic steatosis or hepatocellular disease.      Gallbladder sludge with gallbladder wall thickening. Gallbladder wall thickening can be seen in the setting of cirrhosis, hepatitis, cholecystitis, hypoproteinemia.      Dilated common duct measuring 1.06 cm. Further evaluation can be obtained with MRCP         [id number]-P/[id number]    (Results Pending)       LABORATORY: Reviewed as below.  Results for orders placed or performed during the hospital encounter of 11/16/17   COD (ADULT)   Result Value Ref Range    ABO Grouping Only O     Rh Grouping Only NEG     Antibody Screen-Cod NEG    CBC WITH DIFFERENTIAL   Result Value Ref Range    WBC 9.3 4.8 - 10.8 K/uL    RBC 2.61 (L) 4.20 - 5.40 M/uL    Hemoglobin 9.5 (L) 12.0 - 16.0 g/dL    Hematocrit 27.2 (L) 37.0 - 47.0 %    .2 (H) 81.4 - 97.8 fL    MCH 36.4 (H) 27.0 - 33.0 pg    MCHC 34.9 33.6 - 35.0 g/dL    RDW 63.4 (H) 35.9 - 50.0 fL    Platelet Count 144 (L) 164 - 446 K/uL    MPV 10.1 9.0 - 12.9 fL    Neutrophils-Polys 87.40 (H) 44.00 - 72.00 %    Lymphocytes 5.40 (L) 22.00 - 41.00 %    Monocytes 5.40 0.00 - 13.40 %    Eosinophils 0.40 0.00 - 6.90 %    Basophils 0.50 0.00 - 1.80 %    Immature Granulocytes 0.90 0.00 - 0.90 %    Nucleated RBC 0.00 /100 WBC    Neutrophils (Absolute) 8.13 (H) 2.00 - 7.15 K/uL     Lymphs (Absolute) 0.50 (L) 1.00 - 4.80 K/uL    Monos (Absolute) 0.50 0.00 - 0.85 K/uL    Eos (Absolute) 0.04 0.00 - 0.51 K/uL    Baso (Absolute) 0.05 0.00 - 0.12 K/uL    Immature Granulocytes (abs) 0.08 0.00 - 0.11 K/uL    NRBC (Absolute) 0.00 K/uL   COMP METABOLIC PANEL   Result Value Ref Range    Sodium 136 135 - 145 mmol/L    Potassium 2.5 (LL) 3.6 - 5.5 mmol/L    Chloride 89 (L) 96 - 112 mmol/L    Co2 23 20 - 33 mmol/L    Anion Gap 24.0 (H) 0.0 - 11.9    Glucose 101 (H) 65 - 99 mg/dL    Bun <3 (L) 8 - 22 mg/dL    Creatinine 0.44 (L) 0.50 - 1.40 mg/dL    Calcium 8.5 8.5 - 10.5 mg/dL    AST(SGOT) 93 (H) 12 - 45 U/L    ALT(SGPT) 20 2 - 50 U/L    Alkaline Phosphatase 228 (H) 30 - 99 U/L    Total Bilirubin 3.5 (H) 0.1 - 1.5 mg/dL    Albumin 3.4 3.2 - 4.9 g/dL    Total Protein 6.7 6.0 - 8.2 g/dL    Globulin 3.3 1.9 - 3.5 g/dL    A-G Ratio 1.0 g/dL   LIPASE   Result Value Ref Range    Lipase 89 (H) 11 - 82 U/L   PROTHROMBIN TIME   Result Value Ref Range    PT 16.7 (H) 12.0 - 14.6 sec    INR 1.38 (H) 0.87 - 1.13   APTT   Result Value Ref Range    APTT 37.9 (H) 24.7 - 36.0 sec   ESTIMATED GFR   Result Value Ref Range    GFR If African American >60 >60 mL/min/1.73 m 2    GFR If Non African American >60 >60 mL/min/1.73 m 2   AMMONIA   Result Value Ref Range    Ammonia 49 (H) 11 - 45 umol/L   VITAMIN B12   Result Value Ref Range    Vitamin B12 -True Cobalamin 762 211 - 911 pg/mL   DIAGNOSTIC ALCOHOL   Result Value Ref Range    Diagnostic Alcohol 0.00 0.00 g/dL   FOLATE   Result Value Ref Range    Folate -Folic Acid 3.7 (A) >4.0 ng/mL   EKG (ER)   Result Value Ref Range    Report       [facility] Emergency Dept.    Test Date:  2017  Pt Name:    CHARLEEN RIDER            Department: ER  MRN:        [id number]                      Room:       [facility]  Gender:     F                            Technician: [id number]  :        1965                   Requested By:ELIAS PERERA  Order #:     [id number]                    Reading MD:    Measurements  Intervals                                Axis  Rate:       94                           P:          50  LA:         160                          QRS:        11  QRSD:       104                          T:          7  QT:         388  QTc:        486    Interpretive Statements  SINUS RHYTHM  BORDERLINE R WAVE PROGRESSION, ANTERIOR LEADS  BORDERLINE T ABNORMALITIES, INFERIOR LEADS  BORDERLINE PROLONGED QT INTERVAL  No previous ECG available for comparison         INTERVENTIONS:  Medications   senna-docusate (PERICOLACE or SENOKOT S) 8.6-50 MG per tablet 2 Tab (not administered)     And   polyethylene glycol/lytes (MIRALAX) PACKET 1 Packet (not administered)     And   magnesium hydroxide (MILK OF MAGNESIA) suspension 30 mL (not administered)     And   bisacodyl (DULCOLAX) suppository 10 mg (not administered)   Respiratory Care per Protocol (not administered)   0.9 % NaCl with KCl 20 mEq infusion ( Intravenous New Bag 11/16/17 161)   heparin injection 5,000 Units (not administered)   labetalol (NORMODYNE,TRANDATE) injection 10 mg (not administered)   ondansetron (ZOFRAN) syringe/vial injection 4 mg (not administered)   ondansetron (ZOFRAN ODT) dispertab 4 mg (not administered)   promethazine (PHENERGAN) tablet 12.5-25 mg (not administered)   promethazine (PHENERGAN) suppository 12.5-25 mg (not administered)   prochlorperazine (COMPAZINE) injection 5-10 mg (not administered)   acetaminophen (TYLENOL) tablet 650 mg (not administered)   Pharmacy Consult Request ...Pain Management Review (not administered)     And   oxycodone immediate-release (ROXICODONE) tablet 2.5 mg (not administered)     And   oxycodone immediate-release (ROXICODONE) tablet 5 mg (not administered)     And   morphine (pf) 4 mg/ml injection 2 mg (not administered)   NS infusion 1,000 mL (0 mL Intravenous Stopped 11/16/17 1336)   ondansetron (ZOFRAN) syringe/vial injection 4 mg (4 mg Intravenous  Given 11/16/17 1237)   potassium chloride in water (KCL) ivpb 10 mEq (0 mEq Intravenous Stopped 11/16/17 1115)       COURSE & MEDICAL DECISION MAKING    I reviewed the medical records and the results as mentioned above.  The patient is a 52 year old female with a history of alcohol abuse presented with abdominal pain, distension a/c nausea, non bloody emesis and black stools , guaiac negative and ultrasound showing enlarged CBD. The differential diagnosis is not limited to Alcoholic hepatitis, choledocholithiasis , cholangiocarcinoma, cirrhosis, cholecystitis    12:15 pm : I examined and evaluated the patient at bedside.     12:45 pm: The following testes are ordered CBC, CMP, INR, Vitamin B12 level, Thiamine deficiency, lipase, Ammonia level, Chest x ray, Ultrasound liver for further evaluation.    1:00 pm : The ultrasound showed Gall bladder thickening, CBD enlargement .     1:15 pm : The patient is reevaluated again and denies any acute complaints.     3:00 pm: The Hospitalist is paged and agreed to admit the patient.  PLAN:  The patient will be admitted to the hospitalist  in stable condition for further evaluation, might benefit from MRCP      FINAL IMPRESSION  1) Alcoholic hepatitis  2)Hypokalemia  3) Choledocholithiasis  4) Dehydration  5) Liver failure      Electronically signed by: Umu Raygoza, 11/16/2017 1:05 PM  '

## 2017-11-17 PROBLEM — D64.9 ANEMIA: Status: ACTIVE | Noted: 2017-01-01

## 2017-11-17 PROBLEM — F10.10 ETOH ABUSE: Status: ACTIVE | Noted: 2017-01-01

## 2017-11-17 NOTE — PROGRESS NOTES
2RN skin check completed with RODERICK Larson. Pt states she has eczema. Dry, jaundice skin noted to extremities. All other skin intact. Will continue to monitor

## 2017-11-17 NOTE — PROGRESS NOTES
Received report and care assumed. Pt. Currently sleeping. Work of breathing even and unlabored. Call light/frequently needed items within reach.

## 2017-11-17 NOTE — PROGRESS NOTES
Assumed pt care from ED RN. Pt is A&Ox4, no c/o pain, in no acute distress. VSS, hypotensive yet asymptomatic. Assessment complete. Meds administered. Bed in low locked position with alarm on. Call bell within reach. Bedside swallow screen completed. Awaiting new orders. Will continue to monitor.

## 2017-11-17 NOTE — ED NOTES
Pt. Ambulated to the bathroom with steady gait. Pt. Updated on the plan of care to be admitted to the hospital. Call light within reach. Will continue to monitor.

## 2017-11-17 NOTE — ED NOTES
Pt transferred to [id number], Jaren VAUGHN aware of pt's pending arrival, all belongings accounted for.

## 2017-11-17 NOTE — PROGRESS NOTES
Christa Martinez Fall Risk Assessment:     Last Known Fall: No falls  Mobility: Dizziness/generalized weakness  Medications: No meds  Mental Status/LOC/Awareness: Awake, alert, and oriented to date, place, and person  Toileting Needs: No needs  Volume/Electrolyte Status: Use of IV fluids/tube feeds  Communication/Sensory: No deficits  Behavior: Appropriate behavior  Christa Martinez Fall Risk Total: 5  Fall Risk Level: NO RISK    Universal Fall Precautions:  call light/belongings in reach, bed in low position and locked, siderails up x 2, use non-slip footwear, adequate lighting, clutter free and spill free environment, educate on level of risk, educate to call for assistance    Fall Risk Level Interventions:          Patient Specific Interventions:     Medication: limit combination of prn medications  Mental Status/LOC/Awareness: reinforce falls education, check on patient hourly and utilize bed/chair fall alarm  Toileting: instruct patient/family on the use of grab bars  Volume/Electrolyte Status: administer medications as ordered for nausea and vomiting and monitor abnormal lab values  Communication/Sensory: ensure proper positioning when transferrng/ambulating  Behavioral: administer medication as ordered, initiate plan of care for alcohol withdrawal and instruct/reinforce fall program rationale  Mobility: dangle prior to standing and utilize bed/chair fall alarm

## 2017-11-17 NOTE — PROGRESS NOTES
Shelley from Lab called with critical result of K+ 2.6 at 0500. Critical lab result read back to Shelley.   Dr. Bello notified of critical lab result at 0516.  Critical lab result read back by Dr. Bello.

## 2017-11-17 NOTE — ASSESSMENT & PLAN NOTE
Sever, and in the setting of severe hypomagnesemia.  Likely both due to chronic alcoholism.  Will give 4grams Mg IV, then replace potassium.  Monitor closely.

## 2017-11-18 PROBLEM — D53.9 MACROCYTIC ANEMIA: Status: ACTIVE | Noted: 2017-01-01

## 2017-11-18 PROBLEM — D64.9 ANEMIA: Status: RESOLVED | Noted: 2017-01-01 | Resolved: 2017-01-01

## 2017-11-18 NOTE — PROGRESS NOTES
Pt aox4. DOBSON. Denies any pain. Complaints of nausea. Medicated pt as ordered.  Elevated silva LE. Rechecked BP (see docflows) Pt transferred to w/c [facility] and moved to rm 26-2. Pt's belongings and call light within reach. POC and labs discussed with hospitalist. Charge RN aware with pt's labs and magnesium. Notified pharm with magnesium bag. Attempted 2x to obtain another IV access. RN Apple attempting iv placement.

## 2017-11-18 NOTE — CARE PLAN
Problem: Safety  Goal: Will remain free from injury  Outcome: PROGRESSING AS EXPECTED  Pt bed strip alarm on, and call light within reach. Pt calling accordingly.     Problem: Infection  Goal: Will remain free from infection  Outcome: PROGRESSING AS EXPECTED  Patient educated on using proper hand hygiene after using the restroom.

## 2017-11-18 NOTE — PROGRESS NOTES
[facility] Hospitalist Progress Note    Date of Service: 2017    Chief Complaint  Alcohol withdrawal    Interval Problem Update  52 year old female with abdominal pain, alcohol withdrawal.      Patient currently reports that she is feeling okay.  She has no significant abdominal pain, nausea or vomiting.  She denies chest pain or shortness of breath.  We did discuss the need to stop alcohol, which she reports she has attempted in the past unsuccesfullly.      Consultants/Specialty  none    Disposition  Home when clinically stable         Review of Systems   Constitutional: Negative for chills and fever.   Eyes: Negative for blurred vision and double vision.   Respiratory: Negative for cough, sputum production and shortness of breath.    Cardiovascular: Negative for chest pain and palpitations.   Gastrointestinal: Negative for abdominal pain, constipation, diarrhea, nausea and vomiting.   Genitourinary: Negative for dysuria.   Neurological: Negative for headaches.      Physical Exam  Laboratory/Imaging   Hemodynamics  Temp (24hrs), Av.8 °C (98.2 °F), Min:36.7 °C (98 °F), Max:36.9 °C (98.4 °F)   Temperature: 36.7 °C (98 °F)  Pulse  Av  Min: 61  Max: 106    Blood Pressure: (!) 92/56      Respiratory      Respiration: 18, Pulse Oximetry: 93 %     Work Of Breathing / Effort: Mild  RUL Breath Sounds: Clear, RML Breath Sounds: Clear, RLL Breath Sounds: Diminished, MITCH Breath Sounds: Clear, LLL Breath Sounds: Diminished    Fluids    Intake/Output Summary (Last 24 hours) at 17 1221  Last data filed at 17 0900   Gross per 24 hour   Intake              180 ml   Output                0 ml   Net              180 ml       Nutrition  Orders Placed This Encounter   Procedures   • DIET ORDER     Standing Status:   Standing     Number of Occurrences:   1     Order Specific Question:   Diet:     Answer:   Clear Liquids - No Red Foods [12]     Physical Exam   Constitutional: She is oriented to person, place, and  "time. She appears well-developed and well-nourished. No distress.   HENT:   Head: Normocephalic and atraumatic.   Eyes: Pupils are equal, round, and reactive to light.   Neck: Normal range of motion. Neck supple.   Cardiovascular: Normal rate, regular rhythm and normal heart sounds.  Exam reveals no gallop and no friction rub.    No murmur heard.  Pulmonary/Chest: Effort normal and breath sounds normal. No respiratory distress. She has no wheezes. She has no rales.   Abdominal: Soft. Bowel sounds are normal. She exhibits no distension. There is no tenderness. There is no rebound.   Musculoskeletal: Normal range of motion. She exhibits no edema.   Neurological: She is alert and oriented to person, place, and time. No cranial nerve deficit.   Skin: Skin is warm and dry. She is not diaphoretic.       Recent Labs      11/16/17   1145 11/17/17 0419 11/18/17   0320   WBC  9.3  7.1  6.0   RBC  2.61*  2.40*  2.34*   HEMOGLOBIN  9.5*  8.8*  8.5*   HEMATOCRIT  27.2*  24.6*  23.8*   MCV  104.2*  102.5*  101.7*   MCH  36.4*  36.7*  36.3*   MCHC  34.9  35.8*  35.7*   RDW  63.4*  62.8*  65.3*   PLATELETCT  144*  111*  117*   MPV  10.1  10.5  10.4     Recent Labs      11/16/17   1145  11/17/17 0419  11/18/17   0320   SODIUM  136  138  134*   POTASSIUM  2.5*  2.6*  3.0*   CHLORIDE  89*  97  100   CO2  23  25  25   GLUCOSE  101*  110*  98   BUN  <3*  <3*  <3*   CREATININE  0.44*  0.33*  0.29*   CALCIUM  8.5  7.5*  7.3*     Recent Labs      11/16/17   1145  11/18/17   0320   APTT  37.9*   --    INR  1.38*  1.42*                  Assessment/Plan     Macrocytic anemia   Assessment & Plan    Stable, without bleed and likely due to bone marrow effects from alcohol.         ETOH abuse   Assessment & Plan    Has made multiple attempts at cessation in the past which \"do not work\".  Discussed possibility of antibuse or other pharmacologic treatments, which she is currently discussing with her primary care provider as outpatient.       "   Cirrhosis (CMS-HCC)   Assessment & Plan    Due to alcoholism.  Discussed cessation with patient at bedside.          Alcoholism (CMS-HCC)   Assessment & Plan    No current symptoms of the same.  Continue to monitor with [place]        Hepatitis   Assessment & Plan    Due to alcohol             Hypokalemia   Assessment & Plan    Sever, and in the setting of severe hypomagnesemia.  Likely both due to chronic alcoholism.  Will give 4grams Mg IV, then replace potassium.  Monitor closely.         Abdominal pain   Assessment & Plan    Resolved and due to alcohol related gastritis             Reviewed items::  Labs reviewed and Medications reviewed  Rhodes catheter::  No Rhodes  DVT prophylaxis pharmacological::  Heparin

## 2017-11-18 NOTE — PROGRESS NOTES
Christa Martinez Fall Risk Assessment:     Last Known Fall: No falls  Mobility: Dizziness/generalized weakness  Medications: Cardiovascular or central nervous system meds  Mental Status/LOC/Awareness: Awake, alert, and oriented to date, place, and person  Toileting Needs: No needs  Volume/Electrolyte Status: Use of IV fluids/tube feeds  Communication/Sensory: No deficits  Behavior: Appropriate behavior  Christa Martinez Fall Risk Total: 6  Fall Risk Level: NO RISK    Universal Fall Precautions:  call light/belongings in reach, bed in low position and locked, siderails up x 2, use non-slip footwear    Fall Risk Level Interventions:          Patient Specific Interventions:     Medication: limit combination of prn medications  Mental Status/LOC/Awareness: check on patient hourly and utilize bed/chair fall alarm  Toileting: instruct male patients prone to dizziness to void while sitting  Volume/Electrolyte Status: monitor abnormal lab values  Communication/Sensory: ensure proper positioning when transferrng/ambulating  Behavioral: administer medication as ordered and initiate plan of care for alcohol withdrawal  Mobility: utilize bed/chair fall alarm and ensure bed is locked and in lowest position

## 2017-11-18 NOTE — PROGRESS NOTES
Charge RN at bedside. Unable to establish a secondary IV line. Charge RN informed pt that we will try with ultrasound guided. Pt stated that she would like to sleep first. Charge RN informed pt that she needs the medication and she is at risk if getting heart complications. Pt verbalize understanding and refused to get a second IV access right now. Pt stated that she does not want to be rude, but she really wants to sleep right now. Spoke with pharmacist, POC discussed with pt's IV medications. Pharmacist stated to rune the magnesium by itslef. Pt sleeping, IV magnesium running. Charge RN aware.

## 2017-11-18 NOTE — PROGRESS NOTES
Shelley from Lab called with critical result of phosphorus less than 1 mg/dL at 0501. Critical lab result read back to Shelley.   Dr. Alba notified of critical lab result at 0523.  Critical lab result read back by Dr. Alba.

## 2017-11-18 NOTE — PROGRESS NOTES
2 RN Skin check done with RODERICK Land. No open wounds. Skin is all intact and blanching. A tiny scab is noted on the middle of her back.

## 2017-11-18 NOTE — ASSESSMENT & PLAN NOTE
"Has made multiple attempts at cessation in the past which \"do not work\".  Discussed possibility of antibuse or other pharmacologic treatments, which she is currently discussing with her primary care provider as outpatient.   "

## 2017-11-18 NOTE — PROGRESS NOTES
Patient transferred from [place] [id number] and assumed patient care at 2150. Patient is A&Ox4 this evening. No complaints of pain. No requests at this time. Running NaCl 0.9% with KCl 20 mEq @ 125. Patients bed alarm is on, bed is locked and in lowest position, call light and bedside table are within reach, treaded slipper socks are on, and hourly rounding in place.

## 2017-11-18 NOTE — PROGRESS NOTES
[facility] Hospitalist Progress Note    Date of Service: 2017    Chief Complaint  52 y.o. female admitted 2017 with ETOHism and abd. Pain, N/V    Interval Problem Update  Patient seen and examined today.    Patient tolerating treatment and therapies.  All Data, Medication data reviewed.  Case discussed with nursing as available.  Plan of Care reviewed with patient and notified of changes.   Pt with better SX's, but gets N/V if not medicated, explained POC    Consultants/Specialty  N    Disposition  H        Review of Systems   Constitutional: Positive for malaise/fatigue. Negative for chills and fever.   HENT: Negative.    Eyes: Negative.    Respiratory: Negative.    Cardiovascular: Negative.    Gastrointestinal: Positive for abdominal pain, nausea and vomiting.   Genitourinary: Negative.    Musculoskeletal: Negative.    Skin: Positive for rash.   Neurological: Positive for weakness.   Endo/Heme/Allergies: Negative.    Psychiatric/Behavioral: Positive for substance abuse. The patient is nervous/anxious.    All other systems reviewed and are negative.     Physical Exam  Laboratory/Imaging   Hemodynamics  Temp (24hrs), Av.8 °C (98.2 °F), Min:36.3 °C (97.3 °F), Max:36.9 °C (98.5 °F)   Temperature: 36.9 °C (98.4 °F)  Pulse  Av.1  Min: 89  Max: 106 Heart Rate (Monitored): 92  Blood Pressure: (!) 98/56, NIBP: (!) 90/60      Respiratory      Respiration: 20, Pulse Oximetry: 95 %     Work Of Breathing / Effort: Mild  RUL Breath Sounds: Clear, RML Breath Sounds: Clear, RLL Breath Sounds: Diminished, MITCH Breath Sounds: Clear, LLL Breath Sounds: Diminished    Fluids    Intake/Output Summary (Last 24 hours) at 17 1834  Last data filed at 17 2209   Gross per 24 hour   Intake             1000 ml   Output                0 ml   Net             1000 ml       Nutrition  Orders Placed This Encounter   Procedures   • DIET ORDER     Standing Status:   Standing     Number of Occurrences:   1     Order  Specific Question:   Diet:     Answer:   Clear Liquids - No Red Foods [12]     Physical Exam   Constitutional: She is oriented to person, place, and time. She appears well-developed and well-nourished.   HENT:   Head: Normocephalic and atraumatic.   Nose: Nose normal.   Mouth/Throat: Oropharynx is clear and moist.   Eyes: Conjunctivae and EOM are normal. Pupils are equal, round, and reactive to light.   Neck: Normal range of motion. Neck supple. No JVD present. No thyromegaly present.   Cardiovascular: Normal rate, regular rhythm and normal heart sounds.  Exam reveals no gallop and no friction rub.    Pulmonary/Chest: Effort normal and breath sounds normal. She has no wheezes. She has no rales.   Abdominal: Soft. Bowel sounds are normal. She exhibits distension. She exhibits no mass. There is no tenderness. There is no rebound and no guarding.   Pos fluid   Musculoskeletal: Normal range of motion. She exhibits no edema or tenderness.   Lymphadenopathy:     She has no cervical adenopathy.   Neurological: She is alert and oriented to person, place, and time. No cranial nerve deficit.   Skin: Skin is warm and dry. Rash noted. She is not diaphoretic. There is erythema. There is pallor.   Spider angiomata   Psychiatric: She has a normal mood and affect. Her behavior is normal.   Nursing note and vitals reviewed.      Recent Labs      11/16/17   1145  11/17/17   0419   WBC  9.3  7.1   RBC  2.61*  2.40*   HEMOGLOBIN  9.5*  8.8*   HEMATOCRIT  27.2*  24.6*   MCV  104.2*  102.5*   MCH  36.4*  36.7*   MCHC  34.9  35.8*   RDW  63.4*  62.8*   PLATELETCT  144*  111*   MPV  10.1  10.5     Recent Labs      11/16/17   1145  11/17/17   0419   SODIUM  136  138   POTASSIUM  2.5*  2.6*   CHLORIDE  89*  97   CO2  23  25   GLUCOSE  101*  110*   BUN  <3*  <3*   CREATININE  0.44*  0.33*   CALCIUM  8.5  7.5*     Recent Labs      11/16/17   1145   APTT  37.9*   INR  1.38*                  Assessment/Plan     Anemia   Assessment & Plan    W/u  ordered        ETOH abuse   Assessment & Plan    Needs AA etc   also a drinker        Cirrhosis (CMS-HCC)   Assessment & Plan    Suspected dx. INR is up. abd distended. etoh hx. Workup as above.         Alcoholism (CMS-HCC)   Assessment & Plan    ciwa protocol        Hepatitis   Assessment & Plan    Suspect alcoholic hepatitis.           Hypokalemia   Assessment & Plan    Trend and replace.   Pt not doing well with po's        Abdominal pain   Assessment & Plan    Suspect from alcoholic gastritis/hepatitis.   Negative MRCP  Doubt true GB pain related SX's   Pain control.             Reviewed items::  Radiology images reviewed, Labs reviewed and Medications reviewed  Rhodes catheter::  No Rhodes  DVT prophylaxis pharmacological::  Contraindicated - High bleeding risk  Ulcer Prophylaxis::  Yes      Plan  Tx GI SX's  F/u labs  Start Cirrhosis TX  Ok for medical transfer  Close obs on Gallbladder  Might need surgery eval if worsens  See orders  Supportive care

## 2017-11-18 NOTE — PROGRESS NOTES
Assumed pt care from day shift RN. Pt is A&Ox4, in no acute distress, no c/o pain. VSS. Assessment complete. Bed in locked and low position with alarm on. Call bell within reach. Will continue to monitor.

## 2017-11-19 PROBLEM — R10.9 ABDOMINAL PAIN: Status: RESOLVED | Noted: 2017-01-01 | Resolved: 2017-01-01

## 2017-11-19 PROBLEM — E87.6 HYPOKALEMIA: Status: RESOLVED | Noted: 2017-01-01 | Resolved: 2017-01-01

## 2017-11-19 NOTE — PROGRESS NOTES
"Patient ordered for discharge. IV removed, discharge instructions explained. Patient states she has a follow up appointment scheduled for Dec 1st.Information on alcohol addiction and withdrawal symptoms printed from [facility] and discussed with both patient and her . Patient's  asked at end of instructions, \"So when can she start drinking again?\" Discussed effects of alcohol on the liver and how patient would have to start over again with cessation if she has another drink. Patient demonstrates and reports understanding,  requiring reinforcement. Patient to lobby with patient transport.     Ana Maria Amaya RN  "

## 2017-11-19 NOTE — CARE PLAN
Problem: Safety  Goal: Will remain free from falls    Intervention: Assess risk factors for falls  Patient is upself. Educated patient that staff is still available to help if she feels she needs help mobilizing. Patient verbalized understanding.       Problem: Knowledge Deficit  Goal: Knowledge of disease process/condition, treatment plan, diagnostic tests, and medications will improve    Intervention: Explain information regarding disease process/condition, treatment plan, diagnostic tests, and medications and document in education  Educated patient on diet plan. Patient verbalized understanding.

## 2017-11-19 NOTE — PROGRESS NOTES
Assumed care of patient at 0700. Patient with incontinence of stool this am, cleaned and sheets changed. Diet advanced to regular for breakfast. Patient tolerated small amount of breakfast but stated she wanted to take it slow to make sure her stomach could handle it. Patient is ordered for discharge, discussed times for . Will continue to monitor.    Ana Maria Amaya RN

## 2017-11-19 NOTE — PROGRESS NOTES
Pt is AOx4. Plan of care discussed. Denies pain. Complains of nausea, states she would like to advance her diet if possible. Call light in use, treaded socks on, bed locked in low position    Occult blood stool sample collected and sent to lab

## 2017-11-19 NOTE — DISCHARGE SUMMARY
CHIEF COMPLAINT ON ADMISSION  Chief Complaint   Patient presents with   • Bloody Stools   • N/V       CODE STATUS  Full Code    HPI & HOSPITAL COURSE  This is a 52 y.o. female here with abdominal pain, nausea and vomiting.     Patient was admitted to the hospitalist service.  She was noted to have alcoholism and was monitored for withdrawal.  She had some abdominal pain, and imaging supported the diagnosis of cirrhosis like due to long standing alcoholism.  She did have improvement in her symptoms, and was able to tolerate oral intake.  She was noted to be have quite severe hypomagnesemia and hypokalemia and these were replaced.  I did have a long discussion with her regarding need for cessation of alcohol to avoid any further damage.  She will need to follow closely with her primary care physician on discharge.       Therefore, she is discharged in good and stable condition with close outpatient follow-up.    SPECIFIC OUTPATIENT FOLLOW-UP  PCP    DISCHARGE PROBLEM LIST  Active Problems:    Hepatitis POA: Unknown    Alcoholism (CMS-HCC) POA: Unknown    Cirrhosis (CMS-HCC) POA: Unknown    ETOH abuse POA: Unknown    Macrocytic anemia POA: Unknown  Resolved Problems:    Abdominal pain POA: Unknown    Hypokalemia POA: Unknown    Anemia POA: Unknown      FOLLOW UP  No future appointments.  No follow-up provider specified.    MEDICATIONS ON DISCHARGE  There are no discharge medications for this patient.       DIET  Orders Placed This Encounter   Procedures   • DIET ORDER     Standing Status:   Standing     Number of Occurrences:   1     Order Specific Question:   Diet:     Answer:   Regular [1]       ACTIVITY  AS tolerated   Weight bearing as tolerated      CONSULTATIONS  none    PROCEDURES  none    LABORATORY  Lab Results   Component Value Date/Time    SODIUM 136 11/19/2017 03:17 AM    POTASSIUM 4.0 11/19/2017 03:17 AM    CHLORIDE 106 11/19/2017 03:17 AM    CO2 22 11/19/2017 03:17 AM    GLUCOSE 96 11/19/2017 03:17 AM     BUN <3 (L) 11/19/2017 03:17 AM    CREATININE 0.27 (L) 11/19/2017 03:17 AM        Lab Results   Component Value Date/Time    WBC 6.8 11/19/2017 03:17 AM    HEMOGLOBIN 8.2 (L) 11/19/2017 03:17 AM    HEMATOCRIT 23.0 (L) 11/19/2017 03:17 AM    PLATELETCT 128 (L) 11/19/2017 03:17 AM        Total time of the discharge process exceeds 31 minutes

## 2017-11-19 NOTE — DISCHARGE INSTRUCTIONS
Discharge Instructions    Discharged to home by taxi with relative. Discharged via wheelchair, hospital escort: Yes.  Special equipment needed: Not Applicable    Be sure to schedule a follow-up appointment with your primary care doctor or any specialists as instructed.     Discharge Plan:   Diet Plan: Discussed  Activity Level: Discussed  Confirmed Follow up Appointment: Patient to Call and Schedule Appointment  Confirmed Symptoms Management: Discussed  Medication Reconciliation Updated: Yes  Influenza Vaccine Indication: Indicated: 9 to 64 years of age  Influenza Vaccine Given - only chart on this line when given: Influenza Vaccine Given (See MAR)    I understand that a diet low in cholesterol, fat, and sodium is recommended for good health. Unless I have been given specific instructions below for another diet, I accept this instruction as my diet prescription.   Other diet: Regular    Special Instructions: None    · Is patient discharged on Warfarin / Coumadin?   No     · Is patient Post Blood Transfusion?  No    Depression / Suicide Risk    As you are discharged from this [facility] facility, it is important to learn how to keep safe from harming yourself.    Recognize the warning signs:  · Abrupt changes in personality, positive or negative- including increase in energy   · Giving away possessions  · Change in eating patterns- significant weight changes-  positive or negative  · Change in sleeping patterns- unable to sleep or sleeping all the time   · Unwillingness or inability to communicate  · Depression  · Unusual sadness, discouragement and loneliness  · Talk of wanting to die  · Neglect of personal appearance   · Rebelliousness- reckless behavior  · Withdrawal from people/activities they love  · Confusion- inability to concentrate     If you or a loved one observes any of these behaviors or has concerns about self-harm, here's what you can do:  · Talk about it- your feelings and reasons for harming  yourself  · Remove any means that you might use to hurt yourself (examples: pills, rope, extension cords, firearm)  · Get professional help from the community (Mental Health, Substance Abuse, psychological counseling)  · Do not be alone:Call your Safe Contact- someone whom you trust who will be there for you.  · Call your local CRISIS HOTLINE 671-8679 or 933-407-5267  · Call your local Children's Mobile Crisis Response Team [place] (377) 959-9792 or www.[employer]  · Call the toll free National Suicide Prevention Hotlines   · National Suicide Prevention Lifeline 717-883-NTQF (7704)  · National Hope Line Network 800-SUICIDE (527-3396)

## 2017-11-19 NOTE — PROGRESS NOTES
Mak from Lab called with critical result of calcium at 6.5. Critical lab result read back.  Dr. Bello notified of critical lab result. Critical lab result read back.    Orders for calcium gluconate 1 g IV once now.

## 2017-11-21 NOTE — PROGRESS NOTES
Placed discharge outreach phone call to patient s/p hospital discharge 11/19/17.  Left voicemail providing my contact information and instructions to call with any questions or concerns.

## 2017-11-30 NOTE — PROGRESS NOTES
11/30/17 at 1:32 PM--Received VM from pt at 11:57 AM.  Placed phone call to patient s/p hospital discharge 11/19/17.  Pt is requesting copies of her medical records.  Provided pt with medical records phone number and transferred pt to medical records.  Pt states she has no further questions or concerns at this time.

## 2018-01-01 ENCOUNTER — APPOINTMENT (OUTPATIENT)
Dept: RADIOLOGY | Facility: MEDICAL CENTER | Age: 53
DRG: 432 | End: 2018-01-01
Attending: HOSPITALIST
Payer: MEDICAID

## 2018-01-01 ENCOUNTER — APPOINTMENT (OUTPATIENT)
Dept: RADIOLOGY | Facility: MEDICAL CENTER | Age: 53
DRG: 432 | End: 2018-01-01
Attending: INTERNAL MEDICINE
Payer: MEDICAID

## 2018-01-01 ENCOUNTER — HOSPITAL ENCOUNTER (OUTPATIENT)
Dept: RADIOLOGY | Facility: MEDICAL CENTER | Age: 53
End: 2018-07-25
Attending: SPECIALIST
Payer: MEDICAID

## 2018-01-01 ENCOUNTER — APPOINTMENT (OUTPATIENT)
Dept: RADIOLOGY | Facility: MEDICAL CENTER | Age: 53
DRG: 432 | End: 2018-01-01
Attending: EMERGENCY MEDICINE
Payer: MEDICAID

## 2018-01-01 ENCOUNTER — APPOINTMENT (OUTPATIENT)
Dept: RADIOLOGY | Facility: MEDICAL CENTER | Age: 53
DRG: 442 | End: 2018-01-01
Attending: INTERNAL MEDICINE
Payer: MEDICAID

## 2018-01-01 ENCOUNTER — HOSPITAL ENCOUNTER (INPATIENT)
Facility: MEDICAL CENTER | Age: 53
LOS: 2 days | DRG: 433 | End: 2018-08-29
Attending: EMERGENCY MEDICINE | Admitting: INTERNAL MEDICINE
Payer: MEDICAID

## 2018-01-01 ENCOUNTER — APPOINTMENT (OUTPATIENT)
Dept: RADIOLOGY | Facility: MEDICAL CENTER | Age: 53
DRG: 442 | End: 2018-01-01
Attending: HOSPITALIST
Payer: MEDICAID

## 2018-01-01 ENCOUNTER — PATIENT OUTREACH (OUTPATIENT)
Dept: HEALTH INFORMATION MANAGEMENT | Facility: OTHER | Age: 53
End: 2018-01-01

## 2018-01-01 ENCOUNTER — RESOLUTE PROFESSIONAL BILLING HOSPITAL PROF FEE (OUTPATIENT)
Dept: HOSPITALIST | Facility: MEDICAL CENTER | Age: 53
End: 2018-01-01
Payer: MEDICAID

## 2018-01-01 ENCOUNTER — HOSPITAL ENCOUNTER (INPATIENT)
Facility: MEDICAL CENTER | Age: 53
LOS: 5 days | DRG: 432 | End: 2018-10-02
Attending: EMERGENCY MEDICINE | Admitting: HOSPITALIST
Payer: MEDICAID

## 2018-01-01 ENCOUNTER — HOSPITAL ENCOUNTER (INPATIENT)
Facility: MEDICAL CENTER | Age: 53
LOS: 9 days | DRG: 442 | End: 2018-03-29
Attending: EMERGENCY MEDICINE | Admitting: HOSPITALIST
Payer: MEDICAID

## 2018-01-01 ENCOUNTER — APPOINTMENT (OUTPATIENT)
Dept: RADIOLOGY | Facility: MEDICAL CENTER | Age: 53
DRG: 433 | End: 2018-01-01
Attending: EMERGENCY MEDICINE
Payer: MEDICAID

## 2018-01-01 VITALS
DIASTOLIC BLOOD PRESSURE: 71 MMHG | BODY MASS INDEX: 26.72 KG/M2 | TEMPERATURE: 96 F | HEART RATE: 62 BPM | SYSTOLIC BLOOD PRESSURE: 125 MMHG | RESPIRATION RATE: 28 BRPM | WEIGHT: 156.53 LBS | HEIGHT: 64 IN | OXYGEN SATURATION: 52 %

## 2018-01-01 VITALS
TEMPERATURE: 97.7 F | BODY MASS INDEX: 24.75 KG/M2 | OXYGEN SATURATION: 98 % | RESPIRATION RATE: 18 BRPM | HEART RATE: 65 BPM | DIASTOLIC BLOOD PRESSURE: 67 MMHG | HEIGHT: 64 IN | WEIGHT: 145 LBS | SYSTOLIC BLOOD PRESSURE: 100 MMHG

## 2018-01-01 VITALS
HEART RATE: 59 BPM | BODY MASS INDEX: 23.94 KG/M2 | TEMPERATURE: 96 F | RESPIRATION RATE: 18 BRPM | OXYGEN SATURATION: 92 % | DIASTOLIC BLOOD PRESSURE: 62 MMHG | HEIGHT: 64 IN | WEIGHT: 140.21 LBS | SYSTOLIC BLOOD PRESSURE: 93 MMHG

## 2018-01-01 DIAGNOSIS — N83.201 OVARIAN CYST, RIGHT: ICD-10-CM

## 2018-01-01 DIAGNOSIS — R19.07 GENERALIZED ABDOMINAL OR PELVIC SWELLING OR MASS OR LUMP: ICD-10-CM

## 2018-01-01 DIAGNOSIS — K70.30 ALCOHOLIC CIRRHOSIS OF LIVER WITHOUT ASCITES (HCC): ICD-10-CM

## 2018-01-01 DIAGNOSIS — E86.1 HYPOVOLEMIA: ICD-10-CM

## 2018-01-01 DIAGNOSIS — F10.20 ALCOHOLISM (HCC): ICD-10-CM

## 2018-01-01 DIAGNOSIS — K70.31 ALCOHOLIC CIRRHOSIS OF LIVER WITH ASCITES (HCC): ICD-10-CM

## 2018-01-01 DIAGNOSIS — S32.020A CLOSED COMPRESSION FRACTURE OF SECOND LUMBAR VERTEBRA, INITIAL ENCOUNTER: ICD-10-CM

## 2018-01-01 DIAGNOSIS — K72.10 END STAGE LIVER DISEASE (HCC): ICD-10-CM

## 2018-01-01 DIAGNOSIS — K70.31 ASCITES DUE TO ALCOHOLIC CIRRHOSIS (HCC): ICD-10-CM

## 2018-01-01 DIAGNOSIS — I95.9 HYPOTENSION, UNSPECIFIED HYPOTENSION TYPE: ICD-10-CM

## 2018-01-01 LAB
25(OH)D3 SERPL-MCNC: 8 NG/ML (ref 30–100)
ABO GROUP BLD: NORMAL
ACTION RANGE TRIGGERED IACRT: NO
ACTION RANGE TRIGGERED IACRT: YES
AFP-TM SERPL-MCNC: 2 NG/ML (ref 0–9)
ALBUMIN FLD-MCNC: <1 G/DL
ALBUMIN FLD-MCNC: <1 G/DL
ALBUMIN SERPL BCP-MCNC: 2.1 G/DL (ref 3.2–4.9)
ALBUMIN SERPL BCP-MCNC: 2.3 G/DL (ref 3.2–4.9)
ALBUMIN SERPL BCP-MCNC: 2.4 G/DL (ref 3.2–4.9)
ALBUMIN SERPL BCP-MCNC: 2.5 G/DL (ref 3.2–4.9)
ALBUMIN SERPL BCP-MCNC: 2.6 G/DL (ref 3.2–4.9)
ALBUMIN SERPL BCP-MCNC: 2.6 G/DL (ref 3.2–4.9)
ALBUMIN SERPL BCP-MCNC: 2.7 G/DL (ref 3.2–4.9)
ALBUMIN SERPL BCP-MCNC: 2.7 G/DL (ref 3.2–4.9)
ALBUMIN SERPL BCP-MCNC: 2.9 G/DL (ref 3.2–4.9)
ALBUMIN SERPL BCP-MCNC: 3 G/DL (ref 3.2–4.9)
ALBUMIN SERPL BCP-MCNC: 3.2 G/DL (ref 3.2–4.9)
ALBUMIN SERPL BCP-MCNC: 3.3 G/DL (ref 3.2–4.9)
ALBUMIN SERPL BCP-MCNC: 3.9 G/DL (ref 3.2–4.9)
ALBUMIN SERPL BCP-MCNC: 4.1 G/DL (ref 3.2–4.9)
ALBUMIN SERPL-MCNC: 2.61 G/DL (ref 3.75–5.01)
ALBUMIN/GLOB SERPL: 0.4 G/DL
ALBUMIN/GLOB SERPL: 0.6 G/DL
ALBUMIN/GLOB SERPL: 0.7 G/DL
ALBUMIN/GLOB SERPL: 0.9 G/DL
ALBUMIN/GLOB SERPL: 1 G/DL
ALBUMIN/GLOB SERPL: 1.1 G/DL
ALBUMIN/GLOB SERPL: 3 G/DL
ALP SERPL-CCNC: 106 U/L (ref 30–99)
ALP SERPL-CCNC: 138 U/L (ref 30–99)
ALP SERPL-CCNC: 145 U/L (ref 30–99)
ALP SERPL-CCNC: 166 U/L (ref 30–99)
ALP SERPL-CCNC: 175 U/L (ref 30–99)
ALP SERPL-CCNC: 177 U/L (ref 30–99)
ALP SERPL-CCNC: 180 U/L (ref 30–99)
ALP SERPL-CCNC: 181 U/L (ref 30–99)
ALP SERPL-CCNC: 186 U/L (ref 30–99)
ALP SERPL-CCNC: 207 U/L (ref 30–99)
ALP SERPL-CCNC: 222 U/L (ref 30–99)
ALP SERPL-CCNC: 233 U/L (ref 30–99)
ALP SERPL-CCNC: 46 U/L (ref 30–99)
ALPHA1 GLOB SERPL ELPH-MCNC: 0.51 G/DL (ref 0.19–0.46)
ALPHA2 GLOB SERPL ELPH-MCNC: 0.6 G/DL (ref 0.48–1.05)
ALT SERPL-CCNC: 12 U/L (ref 2–50)
ALT SERPL-CCNC: 15 U/L (ref 2–50)
ALT SERPL-CCNC: 18 U/L (ref 2–50)
ALT SERPL-CCNC: 19 U/L (ref 2–50)
ALT SERPL-CCNC: 20 U/L (ref 2–50)
ALT SERPL-CCNC: 21 U/L (ref 2–50)
ALT SERPL-CCNC: 22 U/L (ref 2–50)
ALT SERPL-CCNC: 23 U/L (ref 2–50)
ALT SERPL-CCNC: 26 U/L (ref 2–50)
ALT SERPL-CCNC: 27 U/L (ref 2–50)
ALT SERPL-CCNC: 28 U/L (ref 2–50)
ALT SERPL-CCNC: 29 U/L (ref 2–50)
ALT SERPL-CCNC: 38 U/L (ref 2–50)
AMMONIA PLAS-SCNC: 114 UMOL/L (ref 11–45)
AMMONIA PLAS-SCNC: 46 UMOL/L (ref 11–45)
AMMONIA PLAS-SCNC: 68 UMOL/L (ref 11–45)
AMMONIA PLAS-SCNC: 72 UMOL/L (ref 11–45)
AMMONIA PLAS-SCNC: 89 UMOL/L (ref 11–45)
AMMONIA PLAS-SCNC: 96 UMOL/L (ref 11–45)
AMYLASE FLD-CCNC: <10 U/L
ANION GAP SERPL CALC-SCNC: 10 MMOL/L (ref 0–11.9)
ANION GAP SERPL CALC-SCNC: 11 MMOL/L (ref 0–11.9)
ANION GAP SERPL CALC-SCNC: 12 MMOL/L (ref 0–11.9)
ANION GAP SERPL CALC-SCNC: 13 MMOL/L (ref 0–11.9)
ANION GAP SERPL CALC-SCNC: 14 MMOL/L (ref 0–11.9)
ANION GAP SERPL CALC-SCNC: 15 MMOL/L (ref 0–11.9)
ANION GAP SERPL CALC-SCNC: 17 MMOL/L (ref 0–11.9)
ANION GAP SERPL CALC-SCNC: 17 MMOL/L (ref 0–11.9)
ANION GAP SERPL CALC-SCNC: 18 MMOL/L (ref 0–11.9)
ANION GAP SERPL CALC-SCNC: 19 MMOL/L (ref 0–11.9)
ANION GAP SERPL CALC-SCNC: 7 MMOL/L (ref 0–11.9)
ANION GAP SERPL CALC-SCNC: 8 MMOL/L (ref 0–11.9)
ANION GAP SERPL CALC-SCNC: 9 MMOL/L (ref 0–11.9)
APPEARANCE FLD: CLEAR
APPEARANCE FLD: CLEAR
APPEARANCE UR: ABNORMAL
APPEARANCE UR: ABNORMAL
APPEARANCE UR: CLEAR
APPEARANCE UR: CLEAR
APTT PPP: 33.9 SEC (ref 24.7–36)
APTT PPP: 44.6 SEC (ref 24.7–36)
APTT PPP: 48.6 SEC (ref 24.7–36)
APTT PPP: 50.5 SEC (ref 24.7–36)
APTT PPP: 59.9 SEC (ref 24.7–36)
AST SERPL-CCNC: 106 U/L (ref 12–45)
AST SERPL-CCNC: 113 U/L (ref 12–45)
AST SERPL-CCNC: 34 U/L (ref 12–45)
AST SERPL-CCNC: 36 U/L (ref 12–45)
AST SERPL-CCNC: 44 U/L (ref 12–45)
AST SERPL-CCNC: 44 U/L (ref 12–45)
AST SERPL-CCNC: 52 U/L (ref 12–45)
AST SERPL-CCNC: 54 U/L (ref 12–45)
AST SERPL-CCNC: 59 U/L (ref 12–45)
AST SERPL-CCNC: 60 U/L (ref 12–45)
AST SERPL-CCNC: 71 U/L (ref 12–45)
AST SERPL-CCNC: 78 U/L (ref 12–45)
AST SERPL-CCNC: 92 U/L (ref 12–45)
B-GLOBULIN SERPL ELPH-MCNC: 1.22 G/DL (ref 0.48–1.1)
BACTERIA #/AREA URNS HPF: ABNORMAL /HPF
BACTERIA BLD CULT: NORMAL
BACTERIA BLD CULT: NORMAL
BACTERIA FLD AEROBE CULT: NORMAL
BACTERIA UR CULT: NORMAL
BARCODED ABORH UBTYP: 5100
BARCODED ABORH UBTYP: 7300
BARCODED ABORH UBTYP: 9500
BARCODED PRD CODE UBPRD: NORMAL
BARCODED UNIT NUM UBUNT: NORMAL
BASE EXCESS BLDA CALC-SCNC: -10 MMOL/L (ref -4–3)
BASE EXCESS BLDA CALC-SCNC: -11 MMOL/L (ref -4–3)
BASE EXCESS BLDA CALC-SCNC: -15 MMOL/L (ref -4–3)
BASE EXCESS BLDA CALC-SCNC: -8 MMOL/L (ref -4–3)
BASOPHILS # BLD AUTO: 0.1 % (ref 0–1.8)
BASOPHILS # BLD AUTO: 0.2 % (ref 0–1.8)
BASOPHILS # BLD AUTO: 0.2 % (ref 0–1.8)
BASOPHILS # BLD AUTO: 0.3 % (ref 0–1.8)
BASOPHILS # BLD AUTO: 0.4 % (ref 0–1.8)
BASOPHILS # BLD AUTO: 0.6 % (ref 0–1.8)
BASOPHILS # BLD AUTO: 0.7 % (ref 0–1.8)
BASOPHILS # BLD AUTO: 0.7 % (ref 0–1.8)
BASOPHILS # BLD: 0.01 K/UL (ref 0–0.12)
BASOPHILS # BLD: 0.02 K/UL (ref 0–0.12)
BASOPHILS # BLD: 0.02 K/UL (ref 0–0.12)
BASOPHILS # BLD: 0.03 K/UL (ref 0–0.12)
BASOPHILS # BLD: 0.03 K/UL (ref 0–0.12)
BASOPHILS # BLD: 0.04 K/UL (ref 0–0.12)
BASOPHILS # BLD: 0.04 K/UL (ref 0–0.12)
BASOPHILS # BLD: 0.05 K/UL (ref 0–0.12)
BASOPHILS NFR FLD: 1 %
BASOPHILS NFR FLD: 1 %
BILIRUB CONJ SERPL-MCNC: 2.6 MG/DL (ref 0.1–0.5)
BILIRUB INDIRECT SERPL-MCNC: 2 MG/DL (ref 0–1)
BILIRUB SERPL-MCNC: 11.1 MG/DL (ref 0.1–1.5)
BILIRUB SERPL-MCNC: 11.5 MG/DL (ref 0.1–1.5)
BILIRUB SERPL-MCNC: 12.6 MG/DL (ref 0.1–1.5)
BILIRUB SERPL-MCNC: 12.7 MG/DL (ref 0.1–1.5)
BILIRUB SERPL-MCNC: 14 MG/DL (ref 0.1–1.5)
BILIRUB SERPL-MCNC: 3 MG/DL (ref 0.1–1.5)
BILIRUB SERPL-MCNC: 4.6 MG/DL (ref 0.1–1.5)
BILIRUB SERPL-MCNC: 5.3 MG/DL (ref 0.1–1.5)
BILIRUB SERPL-MCNC: 5.5 MG/DL (ref 0.1–1.5)
BILIRUB SERPL-MCNC: 6.6 MG/DL (ref 0.1–1.5)
BILIRUB SERPL-MCNC: 7.9 MG/DL (ref 0.1–1.5)
BILIRUB SERPL-MCNC: 8.4 MG/DL (ref 0.1–1.5)
BILIRUB SERPL-MCNC: 9.9 MG/DL (ref 0.1–1.5)
BILIRUB UR QL STRIP.AUTO: ABNORMAL
BLD GP AB SCN SERPL QL: NORMAL
BNP SERPL-MCNC: 1864 PG/ML (ref 0–100)
BODY FLD TYPE: NORMAL
BODY TEMPERATURE: ABNORMAL DEGREES
BUN SERPL-MCNC: 10 MG/DL (ref 8–22)
BUN SERPL-MCNC: 10 MG/DL (ref 8–22)
BUN SERPL-MCNC: 11 MG/DL (ref 8–22)
BUN SERPL-MCNC: 12 MG/DL (ref 8–22)
BUN SERPL-MCNC: 13 MG/DL (ref 8–22)
BUN SERPL-MCNC: 15 MG/DL (ref 8–22)
BUN SERPL-MCNC: 15 MG/DL (ref 8–22)
BUN SERPL-MCNC: 17 MG/DL (ref 8–22)
BUN SERPL-MCNC: 18 MG/DL (ref 8–22)
BUN SERPL-MCNC: 19 MG/DL (ref 8–22)
BUN SERPL-MCNC: 19 MG/DL (ref 8–22)
BUN SERPL-MCNC: 21 MG/DL (ref 8–22)
BUN SERPL-MCNC: 21 MG/DL (ref 8–22)
BUN SERPL-MCNC: 22 MG/DL (ref 8–22)
BUN SERPL-MCNC: 23 MG/DL (ref 8–22)
BUN SERPL-MCNC: 31 MG/DL (ref 8–22)
BUN SERPL-MCNC: 6 MG/DL (ref 8–22)
BUN SERPL-MCNC: 7 MG/DL (ref 8–22)
BUN SERPL-MCNC: 8 MG/DL (ref 8–22)
BUN SERPL-MCNC: 9 MG/DL (ref 8–22)
CALCIUM SERPL-MCNC: 7.3 MG/DL (ref 8.5–10.5)
CALCIUM SERPL-MCNC: 7.7 MG/DL (ref 8.5–10.5)
CALCIUM SERPL-MCNC: 7.7 MG/DL (ref 8.5–10.5)
CALCIUM SERPL-MCNC: 7.8 MG/DL (ref 8.5–10.5)
CALCIUM SERPL-MCNC: 7.8 MG/DL (ref 8.5–10.5)
CALCIUM SERPL-MCNC: 7.9 MG/DL (ref 8.5–10.5)
CALCIUM SERPL-MCNC: 7.9 MG/DL (ref 8.5–10.5)
CALCIUM SERPL-MCNC: 8 MG/DL (ref 8.5–10.5)
CALCIUM SERPL-MCNC: 8.1 MG/DL (ref 8.5–10.5)
CALCIUM SERPL-MCNC: 8.1 MG/DL (ref 8.5–10.5)
CALCIUM SERPL-MCNC: 8.2 MG/DL (ref 8.5–10.5)
CALCIUM SERPL-MCNC: 8.3 MG/DL (ref 8.5–10.5)
CALCIUM SERPL-MCNC: 8.4 MG/DL (ref 8.5–10.5)
CALCIUM SERPL-MCNC: 8.4 MG/DL (ref 8.5–10.5)
CALCIUM SERPL-MCNC: 8.5 MG/DL (ref 8.5–10.5)
CALCIUM SERPL-MCNC: 8.6 MG/DL (ref 8.5–10.5)
CALCIUM SERPL-MCNC: 8.8 MG/DL (ref 8.5–10.5)
CALCIUM SERPL-MCNC: 8.8 MG/DL (ref 8.5–10.5)
CALCIUM SERPL-MCNC: 8.9 MG/DL (ref 8.5–10.5)
CALCIUM SERPL-MCNC: 9 MG/DL (ref 8.5–10.5)
CALCIUM SERPL-MCNC: 9.1 MG/DL (ref 8.5–10.5)
CALCIUM SERPL-MCNC: 9.3 MG/DL (ref 8.5–10.5)
CALCIUM SERPL-MCNC: 9.3 MG/DL (ref 8.5–10.5)
CANCER AG125 SERPL-ACNC: 457.5 U/ML (ref 0–35)
CFT BLD TEG: 5.5 MIN (ref 5–10)
CFT BLD TEG: 8.5 MIN (ref 5–10)
CFT BLD TEG: 8.9 MIN (ref 5–10)
CHLORIDE SERPL-SCNC: 100 MMOL/L (ref 96–112)
CHLORIDE SERPL-SCNC: 100 MMOL/L (ref 96–112)
CHLORIDE SERPL-SCNC: 101 MMOL/L (ref 96–112)
CHLORIDE SERPL-SCNC: 102 MMOL/L (ref 96–112)
CHLORIDE SERPL-SCNC: 103 MMOL/L (ref 96–112)
CHLORIDE SERPL-SCNC: 104 MMOL/L (ref 96–112)
CHLORIDE SERPL-SCNC: 105 MMOL/L (ref 96–112)
CHLORIDE SERPL-SCNC: 106 MMOL/L (ref 96–112)
CHLORIDE SERPL-SCNC: 78 MMOL/L (ref 96–112)
CHLORIDE SERPL-SCNC: 90 MMOL/L (ref 96–112)
CHLORIDE SERPL-SCNC: 91 MMOL/L (ref 96–112)
CHLORIDE SERPL-SCNC: 92 MMOL/L (ref 96–112)
CHLORIDE SERPL-SCNC: 93 MMOL/L (ref 96–112)
CHLORIDE SERPL-SCNC: 94 MMOL/L (ref 96–112)
CHLORIDE SERPL-SCNC: 96 MMOL/L (ref 96–112)
CHLORIDE SERPL-SCNC: 97 MMOL/L (ref 96–112)
CHLORIDE SERPL-SCNC: 98 MMOL/L (ref 96–112)
CHLORIDE SERPL-SCNC: 98 MMOL/L (ref 96–112)
CHLORIDE SERPL-SCNC: 99 MMOL/L (ref 96–112)
CHLORIDE SERPL-SCNC: 99 MMOL/L (ref 96–112)
CHLORIDE UR-SCNC: <15 MMOL/L
CLOT ANGLE BLD TEG: 38.5 DEGREES (ref 53–72)
CLOT ANGLE BLD TEG: 45.2 DEGREES (ref 53–72)
CLOT ANGLE BLD TEG: 46.2 DEGREES (ref 53–72)
CLOT LYSIS 30M P MA LENFR BLD TEG: 0 % (ref 0–8)
CO2 BLDA-SCNC: 11 MMOL/L (ref 20–33)
CO2 BLDA-SCNC: 16 MMOL/L (ref 20–33)
CO2 BLDA-SCNC: 17 MMOL/L (ref 20–33)
CO2 BLDA-SCNC: 18 MMOL/L (ref 20–33)
CO2 SERPL-SCNC: 11 MMOL/L (ref 20–33)
CO2 SERPL-SCNC: 12 MMOL/L (ref 20–33)
CO2 SERPL-SCNC: 13 MMOL/L (ref 20–33)
CO2 SERPL-SCNC: 14 MMOL/L (ref 20–33)
CO2 SERPL-SCNC: 14 MMOL/L (ref 20–33)
CO2 SERPL-SCNC: 16 MMOL/L (ref 20–33)
CO2 SERPL-SCNC: 17 MMOL/L (ref 20–33)
CO2 SERPL-SCNC: 18 MMOL/L (ref 20–33)
CO2 SERPL-SCNC: 18 MMOL/L (ref 20–33)
CO2 SERPL-SCNC: 19 MMOL/L (ref 20–33)
CO2 SERPL-SCNC: 20 MMOL/L (ref 20–33)
CO2 SERPL-SCNC: 21 MMOL/L (ref 20–33)
CO2 SERPL-SCNC: 22 MMOL/L (ref 20–33)
CO2 SERPL-SCNC: 22 MMOL/L (ref 20–33)
CO2 SERPL-SCNC: 23 MMOL/L (ref 20–33)
CO2 SERPL-SCNC: 25 MMOL/L (ref 20–33)
CO2 SERPL-SCNC: 27 MMOL/L (ref 20–33)
CO2 SERPL-SCNC: 31 MMOL/L (ref 20–33)
COLOR FLD: YELLOW
COLOR FLD: YELLOW
COLOR UR: ABNORMAL
COMPONENT FT 8504FT: NORMAL
COMPONENT FT 8504FT: NORMAL
COMPONENT P 8504P: NORMAL
COMPONENT R 8504R: NORMAL
CORTIS SERPL-MCNC: 7.3 UG/DL (ref 0–23)
CORTIS SERPL-MCNC: 7.5 UG/DL (ref 0–23)
CREAT SERPL-MCNC: 0.42 MG/DL (ref 0.5–1.4)
CREAT SERPL-MCNC: 0.43 MG/DL (ref 0.5–1.4)
CREAT SERPL-MCNC: 0.45 MG/DL (ref 0.5–1.4)
CREAT SERPL-MCNC: 0.46 MG/DL (ref 0.5–1.4)
CREAT SERPL-MCNC: 0.47 MG/DL (ref 0.5–1.4)
CREAT SERPL-MCNC: 0.48 MG/DL (ref 0.5–1.4)
CREAT SERPL-MCNC: 0.49 MG/DL (ref 0.5–1.4)
CREAT SERPL-MCNC: 0.5 MG/DL (ref 0.5–1.4)
CREAT SERPL-MCNC: 0.5 MG/DL (ref 0.5–1.4)
CREAT SERPL-MCNC: 0.51 MG/DL (ref 0.5–1.4)
CREAT SERPL-MCNC: 0.54 MG/DL (ref 0.5–1.4)
CREAT SERPL-MCNC: 0.56 MG/DL (ref 0.5–1.4)
CREAT SERPL-MCNC: 0.57 MG/DL (ref 0.5–1.4)
CREAT SERPL-MCNC: 0.6 MG/DL (ref 0.5–1.4)
CREAT SERPL-MCNC: 0.72 MG/DL (ref 0.5–1.4)
CREAT SERPL-MCNC: 0.76 MG/DL (ref 0.5–1.4)
CREAT SERPL-MCNC: 1.01 MG/DL (ref 0.5–1.4)
CREAT SERPL-MCNC: 1.09 MG/DL (ref 0.5–1.4)
CREAT SERPL-MCNC: 1.59 MG/DL (ref 0.5–1.4)
CREAT SERPL-MCNC: 1.96 MG/DL (ref 0.5–1.4)
CREAT SERPL-MCNC: 1.98 MG/DL (ref 0.5–1.4)
CREAT SERPL-MCNC: 1.98 MG/DL (ref 0.5–1.4)
CREAT SERPL-MCNC: 2.04 MG/DL (ref 0.5–1.4)
CREAT SERPL-MCNC: 2.06 MG/DL (ref 0.5–1.4)
CREAT SERPL-MCNC: 2.07 MG/DL (ref 0.5–1.4)
CREAT SERPL-MCNC: 2.07 MG/DL (ref 0.5–1.4)
CREAT SERPL-MCNC: 2.1 MG/DL (ref 0.5–1.4)
CREAT SERPL-MCNC: 2.1 MG/DL (ref 0.5–1.4)
CREAT SERPL-MCNC: 2.11 MG/DL (ref 0.5–1.4)
CREAT SERPL-MCNC: 2.14 MG/DL (ref 0.5–1.4)
CREAT SERPL-MCNC: 2.25 MG/DL (ref 0.5–1.4)
CREAT SERPL-MCNC: 2.25 MG/DL (ref 0.5–1.4)
CREAT SERPL-MCNC: 2.3 MG/DL (ref 0.5–1.4)
CREAT UR-MCNC: 150.2 MG/DL
CREAT UR-MCNC: 47.6 MG/DL
CSF COMMENTS 1658: NORMAL
CT.EXTRINSIC BLD ROTEM: 4.6 MIN (ref 1–3)
CT.EXTRINSIC BLD ROTEM: 4.8 MIN (ref 1–3)
CT.EXTRINSIC BLD ROTEM: 5.8 MIN (ref 1–3)
CULTURE IF INDICATED INDCX: YES UA CULTURE
EER PROT ELECT SER Q1092: ABNORMAL
EKG IMPRESSION: NORMAL
EOSINOPHIL # BLD AUTO: 0 K/UL (ref 0–0.51)
EOSINOPHIL # BLD AUTO: 0.02 K/UL (ref 0–0.51)
EOSINOPHIL # BLD AUTO: 0.04 K/UL (ref 0–0.51)
EOSINOPHIL # BLD AUTO: 0.06 K/UL (ref 0–0.51)
EOSINOPHIL # BLD AUTO: 0.06 K/UL (ref 0–0.51)
EOSINOPHIL # BLD AUTO: 0.08 K/UL (ref 0–0.51)
EOSINOPHIL # BLD AUTO: 0.13 K/UL (ref 0–0.51)
EOSINOPHIL NFR BLD: 0 % (ref 0–6.9)
EOSINOPHIL NFR BLD: 0.2 % (ref 0–6.9)
EOSINOPHIL NFR BLD: 0.9 % (ref 0–6.9)
EOSINOPHIL NFR BLD: 1 % (ref 0–6.9)
EOSINOPHIL NFR BLD: 1 % (ref 0–6.9)
EOSINOPHIL NFR BLD: 1.1 % (ref 0–6.9)
EOSINOPHIL NFR BLD: 1.6 % (ref 0–6.9)
EOSINOPHIL NFR FLD: 2 %
EPI CELLS #/AREA URNS HPF: ABNORMAL /HPF
EPI CELLS #/AREA URNS HPF: NEGATIVE /HPF
ERYTHROCYTE [DISTWIDTH] IN BLOOD BY AUTOMATED COUNT: 50.3 FL (ref 35.9–50)
ERYTHROCYTE [DISTWIDTH] IN BLOOD BY AUTOMATED COUNT: 51 FL (ref 35.9–50)
ERYTHROCYTE [DISTWIDTH] IN BLOOD BY AUTOMATED COUNT: 52 FL (ref 35.9–50)
ERYTHROCYTE [DISTWIDTH] IN BLOOD BY AUTOMATED COUNT: 52.2 FL (ref 35.9–50)
ERYTHROCYTE [DISTWIDTH] IN BLOOD BY AUTOMATED COUNT: 52.4 FL (ref 35.9–50)
ERYTHROCYTE [DISTWIDTH] IN BLOOD BY AUTOMATED COUNT: 52.6 FL (ref 35.9–50)
ERYTHROCYTE [DISTWIDTH] IN BLOOD BY AUTOMATED COUNT: 54.5 FL (ref 35.9–50)
ERYTHROCYTE [DISTWIDTH] IN BLOOD BY AUTOMATED COUNT: 54.7 FL (ref 35.9–50)
ERYTHROCYTE [DISTWIDTH] IN BLOOD BY AUTOMATED COUNT: 55.8 FL (ref 35.9–50)
ERYTHROCYTE [DISTWIDTH] IN BLOOD BY AUTOMATED COUNT: 56.2 FL (ref 35.9–50)
ERYTHROCYTE [DISTWIDTH] IN BLOOD BY AUTOMATED COUNT: 56.5 FL (ref 35.9–50)
ERYTHROCYTE [DISTWIDTH] IN BLOOD BY AUTOMATED COUNT: 57.2 FL (ref 35.9–50)
ERYTHROCYTE [DISTWIDTH] IN BLOOD BY AUTOMATED COUNT: 58.6 FL (ref 35.9–50)
ERYTHROCYTE [DISTWIDTH] IN BLOOD BY AUTOMATED COUNT: 58.7 FL (ref 35.9–50)
ERYTHROCYTE [DISTWIDTH] IN BLOOD BY AUTOMATED COUNT: 58.7 FL (ref 35.9–50)
ERYTHROCYTE [DISTWIDTH] IN BLOOD BY AUTOMATED COUNT: 59.1 FL (ref 35.9–50)
ERYTHROCYTE [DISTWIDTH] IN BLOOD BY AUTOMATED COUNT: 61.1 FL (ref 35.9–50)
ERYTHROCYTE [DISTWIDTH] IN BLOOD BY AUTOMATED COUNT: 62.4 FL (ref 35.9–50)
ETHANOL BLD-MCNC: 0.02 G/DL
ETHANOL BLD-MCNC: 0.05 G/DL
FERRITIN SERPL-MCNC: 508.3 NG/ML (ref 10–291)
GAMMA GLOB SERPL ELPH-MCNC: 1.57 G/DL (ref 0.62–1.51)
GLOBULIN SER CALC-MCNC: 1.3 G/DL (ref 1.9–3.5)
GLOBULIN SER CALC-MCNC: 2.5 G/DL (ref 1.9–3.5)
GLOBULIN SER CALC-MCNC: 2.8 G/DL (ref 1.9–3.5)
GLOBULIN SER CALC-MCNC: 2.9 G/DL (ref 1.9–3.5)
GLOBULIN SER CALC-MCNC: 2.9 G/DL (ref 1.9–3.5)
GLOBULIN SER CALC-MCNC: 3 G/DL (ref 1.9–3.5)
GLOBULIN SER CALC-MCNC: 3.5 G/DL (ref 1.9–3.5)
GLOBULIN SER CALC-MCNC: 4 G/DL (ref 1.9–3.5)
GLOBULIN SER CALC-MCNC: 4.3 G/DL (ref 1.9–3.5)
GLOBULIN SER CALC-MCNC: 5 G/DL (ref 1.9–3.5)
GLUCOSE BLD-MCNC: 135 MG/DL (ref 65–99)
GLUCOSE BLD-MCNC: 135 MG/DL (ref 65–99)
GLUCOSE BLD-MCNC: 136 MG/DL (ref 65–99)
GLUCOSE BLD-MCNC: 178 MG/DL (ref 65–99)
GLUCOSE BLD-MCNC: 203 MG/DL (ref 65–99)
GLUCOSE BLD-MCNC: 232 MG/DL (ref 65–99)
GLUCOSE BLD-MCNC: 260 MG/DL (ref 65–99)
GLUCOSE BLD-MCNC: 294 MG/DL (ref 65–99)
GLUCOSE SERPL-MCNC: 100 MG/DL (ref 65–99)
GLUCOSE SERPL-MCNC: 100 MG/DL (ref 65–99)
GLUCOSE SERPL-MCNC: 102 MG/DL (ref 65–99)
GLUCOSE SERPL-MCNC: 103 MG/DL (ref 65–99)
GLUCOSE SERPL-MCNC: 109 MG/DL (ref 65–99)
GLUCOSE SERPL-MCNC: 111 MG/DL (ref 65–99)
GLUCOSE SERPL-MCNC: 115 MG/DL (ref 65–99)
GLUCOSE SERPL-MCNC: 116 MG/DL (ref 65–99)
GLUCOSE SERPL-MCNC: 121 MG/DL (ref 65–99)
GLUCOSE SERPL-MCNC: 132 MG/DL (ref 65–99)
GLUCOSE SERPL-MCNC: 135 MG/DL (ref 65–99)
GLUCOSE SERPL-MCNC: 145 MG/DL (ref 65–99)
GLUCOSE SERPL-MCNC: 158 MG/DL (ref 65–99)
GLUCOSE SERPL-MCNC: 161 MG/DL (ref 65–99)
GLUCOSE SERPL-MCNC: 164 MG/DL (ref 65–99)
GLUCOSE SERPL-MCNC: 165 MG/DL (ref 65–99)
GLUCOSE SERPL-MCNC: 258 MG/DL (ref 65–99)
GLUCOSE SERPL-MCNC: 263 MG/DL (ref 65–99)
GLUCOSE SERPL-MCNC: 269 MG/DL (ref 65–99)
GLUCOSE SERPL-MCNC: 286 MG/DL (ref 65–99)
GLUCOSE SERPL-MCNC: 321 MG/DL (ref 65–99)
GLUCOSE SERPL-MCNC: 81 MG/DL (ref 65–99)
GLUCOSE SERPL-MCNC: 85 MG/DL (ref 65–99)
GLUCOSE SERPL-MCNC: 86 MG/DL (ref 65–99)
GLUCOSE SERPL-MCNC: 86 MG/DL (ref 65–99)
GLUCOSE SERPL-MCNC: 90 MG/DL (ref 65–99)
GLUCOSE SERPL-MCNC: 90 MG/DL (ref 65–99)
GLUCOSE SERPL-MCNC: 91 MG/DL (ref 65–99)
GLUCOSE SERPL-MCNC: 95 MG/DL (ref 65–99)
GLUCOSE SERPL-MCNC: 97 MG/DL (ref 65–99)
GLUCOSE SERPL-MCNC: 98 MG/DL (ref 65–99)
GLUCOSE UR STRIP.AUTO-MCNC: NEGATIVE MG/DL
GRAM STN SPEC: NORMAL
HAV IGM SERPL QL IA: NEGATIVE
HBV CORE IGM SER QL: NEGATIVE
HBV SURFACE AG SER QL: NEGATIVE
HCO3 BLDA-SCNC: 10.8 MMOL/L (ref 17–25)
HCO3 BLDA-SCNC: 15 MMOL/L (ref 17–25)
HCO3 BLDA-SCNC: 16.2 MMOL/L (ref 17–25)
HCO3 BLDA-SCNC: 16.6 MMOL/L (ref 17–25)
HCT VFR BLD AUTO: 21.5 % (ref 37–47)
HCT VFR BLD AUTO: 22 % (ref 37–47)
HCT VFR BLD AUTO: 22.9 % (ref 37–47)
HCT VFR BLD AUTO: 23.3 % (ref 37–47)
HCT VFR BLD AUTO: 23.4 % (ref 37–47)
HCT VFR BLD AUTO: 23.4 % (ref 37–47)
HCT VFR BLD AUTO: 24.9 % (ref 37–47)
HCT VFR BLD AUTO: 26.4 % (ref 37–47)
HCT VFR BLD AUTO: 26.7 % (ref 37–47)
HCT VFR BLD AUTO: 27.4 % (ref 37–47)
HCT VFR BLD AUTO: 29.1 % (ref 37–47)
HCT VFR BLD AUTO: 29.9 % (ref 37–47)
HCT VFR BLD AUTO: 30.8 % (ref 37–47)
HCT VFR BLD AUTO: 31.6 % (ref 37–47)
HCT VFR BLD AUTO: 31.9 % (ref 37–47)
HCT VFR BLD AUTO: 35 % (ref 37–47)
HCT VFR BLD AUTO: 35.6 % (ref 37–47)
HCT VFR BLD AUTO: 6.7 % (ref 37–47)
HCV AB SER QL: NEGATIVE
HEMOCCULT SP1 STL QL: NEGATIVE
HEMOCCULT STL QL: POSITIVE
HGB BLD-MCNC: 10 G/DL (ref 12–16)
HGB BLD-MCNC: 10.2 G/DL (ref 12–16)
HGB BLD-MCNC: 10.3 G/DL (ref 12–16)
HGB BLD-MCNC: 10.8 G/DL (ref 12–16)
HGB BLD-MCNC: 10.9 G/DL (ref 12–16)
HGB BLD-MCNC: 11.5 G/DL (ref 12–16)
HGB BLD-MCNC: 11.9 G/DL (ref 12–16)
HGB BLD-MCNC: 12.6 G/DL (ref 12–16)
HGB BLD-MCNC: 12.9 G/DL (ref 12–16)
HGB BLD-MCNC: 2.2 G/DL (ref 12–16)
HGB BLD-MCNC: 7.4 G/DL (ref 12–16)
HGB BLD-MCNC: 7.5 G/DL (ref 12–16)
HGB BLD-MCNC: 7.6 G/DL (ref 12–16)
HGB BLD-MCNC: 8 G/DL (ref 12–16)
HGB BLD-MCNC: 8.3 G/DL (ref 12–16)
HGB BLD-MCNC: 8.5 G/DL (ref 12–16)
HGB BLD-MCNC: 8.5 G/DL (ref 12–16)
HGB BLD-MCNC: 9.4 G/DL (ref 12–16)
HISTIOCYTES NFR FLD: 1 %
HISTIOCYTES NFR FLD: 6 %
HOROWITZ INDEX BLDA+IHG-RTO: 134 MM[HG]
HOROWITZ INDEX BLDA+IHG-RTO: 194 MM[HG]
HOROWITZ INDEX BLDA+IHG-RTO: 54 MM[HG]
HOROWITZ INDEX BLDA+IHG-RTO: 61 MM[HG]
HYALINE CASTS #/AREA URNS LPF: >10 /LPF
HYALINE CASTS #/AREA URNS LPF: ABNORMAL /LPF
IMM GRANULOCYTES # BLD AUTO: 0.02 K/UL (ref 0–0.11)
IMM GRANULOCYTES # BLD AUTO: 0.03 K/UL (ref 0–0.11)
IMM GRANULOCYTES # BLD AUTO: 0.04 K/UL (ref 0–0.11)
IMM GRANULOCYTES # BLD AUTO: 0.04 K/UL (ref 0–0.11)
IMM GRANULOCYTES # BLD AUTO: 0.05 K/UL (ref 0–0.11)
IMM GRANULOCYTES # BLD AUTO: 0.05 K/UL (ref 0–0.11)
IMM GRANULOCYTES # BLD AUTO: 0.06 K/UL (ref 0–0.11)
IMM GRANULOCYTES # BLD AUTO: 0.13 K/UL (ref 0–0.11)
IMM GRANULOCYTES # BLD AUTO: 0.19 K/UL (ref 0–0.11)
IMM GRANULOCYTES # BLD AUTO: 0.28 K/UL (ref 0–0.11)
IMM GRANULOCYTES # BLD AUTO: 0.34 K/UL (ref 0–0.11)
IMM GRANULOCYTES NFR BLD AUTO: 0.3 % (ref 0–0.9)
IMM GRANULOCYTES NFR BLD AUTO: 0.4 % (ref 0–0.9)
IMM GRANULOCYTES NFR BLD AUTO: 0.5 % (ref 0–0.9)
IMM GRANULOCYTES NFR BLD AUTO: 0.5 % (ref 0–0.9)
IMM GRANULOCYTES NFR BLD AUTO: 0.6 % (ref 0–0.9)
IMM GRANULOCYTES NFR BLD AUTO: 0.6 % (ref 0–0.9)
IMM GRANULOCYTES NFR BLD AUTO: 0.7 % (ref 0–0.9)
IMM GRANULOCYTES NFR BLD AUTO: 0.9 % (ref 0–0.9)
IMM GRANULOCYTES NFR BLD AUTO: 1 % (ref 0–0.9)
IMM GRANULOCYTES NFR BLD AUTO: 1.1 % (ref 0–0.9)
IMM GRANULOCYTES NFR BLD AUTO: 1.1 % (ref 0–0.9)
IMM GRANULOCYTES NFR BLD AUTO: 1.5 % (ref 0–0.9)
IMM GRANULOCYTES NFR BLD AUTO: 1.8 % (ref 0–0.9)
INR PPP: 1.21 (ref 0.87–1.13)
INR PPP: 1.46 (ref 0.87–1.13)
INR PPP: 1.49 (ref 0.87–1.13)
INR PPP: 1.49 (ref 0.87–1.13)
INR PPP: 1.6 (ref 0.87–1.13)
INR PPP: 1.8 (ref 0.87–1.13)
INR PPP: 2.19 (ref 0.87–1.13)
INR PPP: 2.32 (ref 0.87–1.13)
INR PPP: 4.87 (ref 0.87–1.13)
INST. QUALIFIED PATIENT IIQPT: YES
INTERPRETATION SERPL IFE-IMP: ABNORMAL
IRON SATN MFR SERPL: ABNORMAL % (ref 15–55)
IRON SERPL-MCNC: 39 UG/DL (ref 40–170)
KETONES UR STRIP.AUTO-MCNC: NEGATIVE MG/DL
LACTATE BLD-SCNC: 1.6 MMOL/L (ref 0.5–2)
LACTATE BLD-SCNC: 1.9 MMOL/L (ref 0.5–2)
LACTATE BLD-SCNC: 2.2 MMOL/L (ref 0.5–2)
LACTATE BLD-SCNC: 3.1 MMOL/L (ref 0.5–2)
LEUKOCYTE ESTERASE UR QL STRIP.AUTO: ABNORMAL
LIPASE SERPL-CCNC: 13 U/L (ref 11–82)
LIPASE SERPL-CCNC: 8 U/L (ref 11–82)
LV EJECT FRACT  99904: 60
LV EJECT FRACT MOD 2C 99903: 48.98
LV EJECT FRACT MOD 4C 99902: 51.79
LV EJECT FRACT MOD BP 99901: 46.16
LYMPHOCYTES # BLD AUTO: 0.36 K/UL (ref 1–4.8)
LYMPHOCYTES # BLD AUTO: 0.38 K/UL (ref 1–4.8)
LYMPHOCYTES # BLD AUTO: 0.44 K/UL (ref 1–4.8)
LYMPHOCYTES # BLD AUTO: 0.69 K/UL (ref 1–4.8)
LYMPHOCYTES # BLD AUTO: 0.84 K/UL (ref 1–4.8)
LYMPHOCYTES # BLD AUTO: 0.9 K/UL (ref 1–4.8)
LYMPHOCYTES # BLD AUTO: 1.02 K/UL (ref 1–4.8)
LYMPHOCYTES # BLD AUTO: 1.25 K/UL (ref 1–4.8)
LYMPHOCYTES # BLD AUTO: 1.31 K/UL (ref 1–4.8)
LYMPHOCYTES # BLD AUTO: 1.63 K/UL (ref 1–4.8)
LYMPHOCYTES # BLD AUTO: 1.69 K/UL (ref 1–4.8)
LYMPHOCYTES # BLD AUTO: 1.91 K/UL (ref 1–4.8)
LYMPHOCYTES # BLD AUTO: 1.95 K/UL (ref 1–4.8)
LYMPHOCYTES NFR BLD: 10 % (ref 22–41)
LYMPHOCYTES NFR BLD: 10.2 % (ref 22–41)
LYMPHOCYTES NFR BLD: 12.2 % (ref 22–41)
LYMPHOCYTES NFR BLD: 14.8 % (ref 22–41)
LYMPHOCYTES NFR BLD: 15.3 % (ref 22–41)
LYMPHOCYTES NFR BLD: 15.4 % (ref 22–41)
LYMPHOCYTES NFR BLD: 15.7 % (ref 22–41)
LYMPHOCYTES NFR BLD: 16.4 % (ref 22–41)
LYMPHOCYTES NFR BLD: 6.8 % (ref 22–41)
LYMPHOCYTES NFR BLD: 7.2 % (ref 22–41)
LYMPHOCYTES NFR BLD: 7.5 % (ref 22–41)
LYMPHOCYTES NFR BLD: 8.8 % (ref 22–41)
LYMPHOCYTES NFR BLD: 9.7 % (ref 22–41)
LYMPHOCYTES NFR FLD: 21 %
LYMPHOCYTES NFR FLD: 9 %
MAGNESIUM SERPL-MCNC: 0.9 MG/DL (ref 1.5–2.5)
MAGNESIUM SERPL-MCNC: 1.5 MG/DL (ref 1.5–2.5)
MAGNESIUM SERPL-MCNC: 1.6 MG/DL (ref 1.5–2.5)
MAGNESIUM SERPL-MCNC: 1.7 MG/DL (ref 1.5–2.5)
MAGNESIUM SERPL-MCNC: 2.2 MG/DL (ref 1.5–2.5)
MAGNESIUM SERPL-MCNC: 2.3 MG/DL (ref 1.5–2.5)
MCF BLD TEG: 38.6 MM (ref 50–70)
MCF BLD TEG: 40.8 MM (ref 50–70)
MCF BLD TEG: 41.3 MM (ref 50–70)
MCH RBC QN AUTO: 31.5 PG (ref 27–33)
MCH RBC QN AUTO: 31.8 PG (ref 27–33)
MCH RBC QN AUTO: 32 PG (ref 27–33)
MCH RBC QN AUTO: 32.3 PG (ref 27–33)
MCH RBC QN AUTO: 32.3 PG (ref 27–33)
MCH RBC QN AUTO: 33.3 PG (ref 27–33)
MCH RBC QN AUTO: 35 PG (ref 27–33)
MCH RBC QN AUTO: 35 PG (ref 27–33)
MCH RBC QN AUTO: 35.1 PG (ref 27–33)
MCH RBC QN AUTO: 35.4 PG (ref 27–33)
MCH RBC QN AUTO: 35.5 PG (ref 27–33)
MCH RBC QN AUTO: 35.5 PG (ref 27–33)
MCH RBC QN AUTO: 35.8 PG (ref 27–33)
MCH RBC QN AUTO: 36 PG (ref 27–33)
MCH RBC QN AUTO: 36.1 PG (ref 27–33)
MCH RBC QN AUTO: 36.1 PG (ref 27–33)
MCH RBC QN AUTO: 36.6 PG (ref 27–33)
MCH RBC QN AUTO: 36.6 PG (ref 27–33)
MCHC RBC AUTO-ENTMCNC: 32.3 G/DL (ref 33.6–35)
MCHC RBC AUTO-ENTMCNC: 32.8 G/DL (ref 33.6–35)
MCHC RBC AUTO-ENTMCNC: 34.1 G/DL (ref 33.6–35)
MCHC RBC AUTO-ENTMCNC: 34.1 G/DL (ref 33.6–35)
MCHC RBC AUTO-ENTMCNC: 34.3 G/DL (ref 33.6–35)
MCHC RBC AUTO-ENTMCNC: 35.1 G/DL (ref 33.6–35)
MCHC RBC AUTO-ENTMCNC: 35.3 G/DL (ref 33.6–35)
MCHC RBC AUTO-ENTMCNC: 35.4 G/DL (ref 33.6–35)
MCHC RBC AUTO-ENTMCNC: 35.5 G/DL (ref 33.6–35)
MCHC RBC AUTO-ENTMCNC: 35.6 G/DL (ref 33.6–35)
MCHC RBC AUTO-ENTMCNC: 36 G/DL (ref 33.6–35)
MCHC RBC AUTO-ENTMCNC: 36.2 G/DL (ref 33.6–35)
MCHC RBC AUTO-ENTMCNC: 36.3 G/DL (ref 33.6–35)
MCHC RBC AUTO-ENTMCNC: 36.4 G/DL (ref 33.6–35)
MCHC RBC AUTO-ENTMCNC: 36.5 G/DL (ref 33.6–35)
MCHC RBC AUTO-ENTMCNC: 37.2 G/DL (ref 33.6–35)
MCHC RBC AUTO-ENTMCNC: 37.3 G/DL (ref 33.6–35)
MCHC RBC AUTO-ENTMCNC: 37.5 G/DL (ref 33.6–35)
MCV RBC AUTO: 100 FL (ref 81.4–97.8)
MCV RBC AUTO: 102.7 FL (ref 81.4–97.8)
MCV RBC AUTO: 102.9 FL (ref 81.4–97.8)
MCV RBC AUTO: 108.1 FL (ref 81.4–97.8)
MCV RBC AUTO: 91.1 FL (ref 81.4–97.8)
MCV RBC AUTO: 91.5 FL (ref 81.4–97.8)
MCV RBC AUTO: 91.8 FL (ref 81.4–97.8)
MCV RBC AUTO: 93.2 FL (ref 81.4–97.8)
MCV RBC AUTO: 93.2 FL (ref 81.4–97.8)
MCV RBC AUTO: 95.7 FL (ref 81.4–97.8)
MCV RBC AUTO: 96.7 FL (ref 81.4–97.8)
MCV RBC AUTO: 97.2 FL (ref 81.4–97.8)
MCV RBC AUTO: 97.4 FL (ref 81.4–97.8)
MCV RBC AUTO: 98.1 FL (ref 81.4–97.8)
MCV RBC AUTO: 98.2 FL (ref 81.4–97.8)
MCV RBC AUTO: 98.7 FL (ref 81.4–97.8)
MCV RBC AUTO: 99.1 FL (ref 81.4–97.8)
MCV RBC AUTO: 99.7 FL (ref 81.4–97.8)
MESOTHL CELL NFR FLD: 2 %
MESOTHL CELL NFR FLD: 4 %
MICRO URNS: ABNORMAL
MONOCYTES # BLD AUTO: 0.05 K/UL (ref 0–0.85)
MONOCYTES # BLD AUTO: 0.2 K/UL (ref 0–0.85)
MONOCYTES # BLD AUTO: 0.59 K/UL (ref 0–0.85)
MONOCYTES # BLD AUTO: 0.61 K/UL (ref 0–0.85)
MONOCYTES # BLD AUTO: 0.61 K/UL (ref 0–0.85)
MONOCYTES # BLD AUTO: 0.78 K/UL (ref 0–0.85)
MONOCYTES # BLD AUTO: 0.8 K/UL (ref 0–0.85)
MONOCYTES # BLD AUTO: 0.92 K/UL (ref 0–0.85)
MONOCYTES # BLD AUTO: 1.36 K/UL (ref 0–0.85)
MONOCYTES # BLD AUTO: 1.37 K/UL (ref 0–0.85)
MONOCYTES # BLD AUTO: 1.78 K/UL (ref 0–0.85)
MONOCYTES # BLD AUTO: 1.98 K/UL (ref 0–0.85)
MONOCYTES # BLD AUTO: 2.23 K/UL (ref 0–0.85)
MONOCYTES NFR BLD AUTO: 0.9 % (ref 0–13.4)
MONOCYTES NFR BLD AUTO: 10.4 % (ref 0–13.4)
MONOCYTES NFR BLD AUTO: 10.8 % (ref 0–13.4)
MONOCYTES NFR BLD AUTO: 11.3 % (ref 0–13.4)
MONOCYTES NFR BLD AUTO: 11.4 % (ref 0–13.4)
MONOCYTES NFR BLD AUTO: 11.8 % (ref 0–13.4)
MONOCYTES NFR BLD AUTO: 16.7 % (ref 0–13.4)
MONOCYTES NFR BLD AUTO: 25.4 % (ref 0–13.4)
MONOCYTES NFR BLD AUTO: 4.6 % (ref 0–13.4)
MONOCYTES NFR BLD AUTO: 6.8 % (ref 0–13.4)
MONOCYTES NFR BLD AUTO: 7.4 % (ref 0–13.4)
MONOCYTES NFR BLD AUTO: 8 % (ref 0–13.4)
MONOCYTES NFR BLD AUTO: 9.5 % (ref 0–13.4)
MONONUC CELLS NFR FLD: 15 %
MONONUC CELLS NFR FLD: 19 %
MUCOUS THREADS #/AREA URNS HPF: ABNORMAL /HPF
NEUTROPHILS # BLD AUTO: 11.62 K/UL (ref 2–7.15)
NEUTROPHILS # BLD AUTO: 13.02 K/UL (ref 2–7.15)
NEUTROPHILS # BLD AUTO: 19.07 K/UL (ref 2–7.15)
NEUTROPHILS # BLD AUTO: 3.01 K/UL (ref 2–7.15)
NEUTROPHILS # BLD AUTO: 3.1 K/UL (ref 2–7.15)
NEUTROPHILS # BLD AUTO: 3.69 K/UL (ref 2–7.15)
NEUTROPHILS # BLD AUTO: 4.7 K/UL (ref 2–7.15)
NEUTROPHILS # BLD AUTO: 4.85 K/UL (ref 2–7.15)
NEUTROPHILS # BLD AUTO: 5.78 K/UL (ref 2–7.15)
NEUTROPHILS # BLD AUTO: 5.94 K/UL (ref 2–7.15)
NEUTROPHILS # BLD AUTO: 7.43 K/UL (ref 2–7.15)
NEUTROPHILS # BLD AUTO: 8.08 K/UL (ref 2–7.15)
NEUTROPHILS # BLD AUTO: 9.19 K/UL (ref 2–7.15)
NEUTROPHILS NFR BLD: 56.1 % (ref 44–72)
NEUTROPHILS NFR BLD: 66.3 % (ref 44–72)
NEUTROPHILS NFR BLD: 70.7 % (ref 44–72)
NEUTROPHILS NFR BLD: 72.7 % (ref 44–72)
NEUTROPHILS NFR BLD: 74.2 % (ref 44–72)
NEUTROPHILS NFR BLD: 74.3 % (ref 44–72)
NEUTROPHILS NFR BLD: 77.3 % (ref 44–72)
NEUTROPHILS NFR BLD: 80.1 % (ref 44–72)
NEUTROPHILS NFR BLD: 81 % (ref 44–72)
NEUTROPHILS NFR BLD: 81.6 % (ref 44–72)
NEUTROPHILS NFR BLD: 82.5 % (ref 44–72)
NEUTROPHILS NFR BLD: 85.2 % (ref 44–72)
NEUTROPHILS NFR BLD: 91.5 % (ref 44–72)
NEUTROPHILS NFR FLD: 54 %
NEUTROPHILS NFR FLD: 65 %
NITRITE UR QL STRIP.AUTO: NEGATIVE
NITRITE UR QL STRIP.AUTO: POSITIVE
NITRITE UR QL STRIP.AUTO: POSITIVE
NRBC # BLD AUTO: 0 K/UL
NRBC # BLD AUTO: 0.02 K/UL
NRBC # BLD AUTO: 0.03 K/UL
NRBC # BLD AUTO: 0.04 K/UL
NRBC BLD-RTO: 0 /100 WBC
NRBC BLD-RTO: 0.1 /100 WBC
NRBC BLD-RTO: 0.2 /100 WBC
NRBC BLD-RTO: 0.2 /100 WBC
O2/TOTAL GAS SETTING VFR VENT: 100 %
O2/TOTAL GAS SETTING VFR VENT: 100 %
O2/TOTAL GAS SETTING VFR VENT: 50 %
O2/TOTAL GAS SETTING VFR VENT: 80 %
OSMOLALITY SERPL: 265 MOSM/KG H2O (ref 278–298)
OSMOLALITY SERPL: 268 MOSM/KG H2O (ref 278–298)
OSMOLALITY SERPL: 268 MOSM/KG H2O (ref 278–298)
OSMOLALITY UR: 149 MOSM/KG H2O (ref 300–900)
OSMOLALITY UR: 204 MOSM/KG H2O (ref 300–900)
OSMOLALITY UR: 257 MOSM/KG H2O (ref 300–900)
PA AA BLD-ACNC: 100 %
PA AA BLD-ACNC: ABNORMAL %
PA ADP BLD-ACNC: 100 %
PA ADP BLD-ACNC: ABNORMAL %
PCO2 BLDA: 21.4 MMHG (ref 26–37)
PCO2 BLDA: 29 MMHG (ref 26–37)
PCO2 BLDA: 29.6 MMHG (ref 26–37)
PCO2 BLDA: 42 MMHG (ref 26–37)
PCO2 TEMP ADJ BLDA: 21.1 MMHG (ref 26–37)
PCO2 TEMP ADJ BLDA: 28.1 MMHG (ref 26–37)
PCO2 TEMP ADJ BLDA: 28.5 MMHG (ref 26–37)
PCO2 TEMP ADJ BLDA: 40.6 MMHG (ref 26–37)
PH BLDA: 7.21 [PH] (ref 7.4–7.5)
PH BLDA: 7.31 [PH] (ref 7.4–7.5)
PH BLDA: 7.31 [PH] (ref 7.4–7.5)
PH BLDA: 7.36 [PH] (ref 7.4–7.5)
PH TEMP ADJ BLDA: 7.21 [PH] (ref 7.4–7.5)
PH TEMP ADJ BLDA: 7.31 [PH] (ref 7.4–7.5)
PH TEMP ADJ BLDA: 7.32 [PH] (ref 7.4–7.5)
PH TEMP ADJ BLDA: 7.37 [PH] (ref 7.4–7.5)
PH UR STRIP.AUTO: 5.5 [PH]
PH UR STRIP.AUTO: 6 [PH]
PH UR STRIP.AUTO: 6 [PH]
PHOSPHATE SERPL-MCNC: 1.6 MG/DL (ref 2.5–4.5)
PHOSPHATE SERPL-MCNC: 2.9 MG/DL (ref 2.5–4.5)
PHOSPHATE SERPL-MCNC: 2.9 MG/DL (ref 2.5–4.5)
PHOSPHATE SERPL-MCNC: 4 MG/DL (ref 2.5–4.5)
PHOSPHATE SERPL-MCNC: 5.5 MG/DL (ref 2.5–4.5)
PHOSPHATE SERPL-MCNC: 5.8 MG/DL (ref 2.5–4.5)
PHOSPHATE SERPL-MCNC: 6.2 MG/DL (ref 2.5–4.5)
PHOSPHATE SERPL-MCNC: <1 MG/DL (ref 2.5–4.5)
PLATELET # BLD AUTO: 103 K/UL (ref 164–446)
PLATELET # BLD AUTO: 108 K/UL (ref 164–446)
PLATELET # BLD AUTO: 111 K/UL (ref 164–446)
PLATELET # BLD AUTO: 111 K/UL (ref 164–446)
PLATELET # BLD AUTO: 123 K/UL (ref 164–446)
PLATELET # BLD AUTO: 123 K/UL (ref 164–446)
PLATELET # BLD AUTO: 125 K/UL (ref 164–446)
PLATELET # BLD AUTO: 125 K/UL (ref 164–446)
PLATELET # BLD AUTO: 126 K/UL (ref 164–446)
PLATELET # BLD AUTO: 156 K/UL (ref 164–446)
PLATELET # BLD AUTO: 162 K/UL (ref 164–446)
PLATELET # BLD AUTO: 183 K/UL (ref 164–446)
PLATELET # BLD AUTO: 184 K/UL (ref 164–446)
PLATELET # BLD AUTO: 188 K/UL (ref 164–446)
PLATELET # BLD AUTO: 58 K/UL (ref 164–446)
PLATELET # BLD AUTO: 76 K/UL (ref 164–446)
PLATELET # BLD AUTO: 89 K/UL (ref 164–446)
PLATELET # BLD AUTO: 90 K/UL (ref 164–446)
PMV BLD AUTO: 10.2 FL (ref 9–12.9)
PMV BLD AUTO: 10.3 FL (ref 9–12.9)
PMV BLD AUTO: 10.4 FL (ref 9–12.9)
PMV BLD AUTO: 10.6 FL (ref 9–12.9)
PMV BLD AUTO: 10.7 FL (ref 9–12.9)
PMV BLD AUTO: 10.9 FL (ref 9–12.9)
PMV BLD AUTO: 10.9 FL (ref 9–12.9)
PMV BLD AUTO: 11 FL (ref 9–12.9)
PMV BLD AUTO: 11 FL (ref 9–12.9)
PMV BLD AUTO: 11.2 FL (ref 9–12.9)
PMV BLD AUTO: 11.2 FL (ref 9–12.9)
PMV BLD AUTO: 11.3 FL (ref 9–12.9)
PMV BLD AUTO: 12.1 FL (ref 9–12.9)
PMV BLD AUTO: 12.7 FL (ref 9–12.9)
PMV BLD AUTO: 12.9 FL (ref 9–12.9)
PO2 BLDA: 107 MMHG (ref 64–87)
PO2 BLDA: 54 MMHG (ref 64–87)
PO2 BLDA: 61 MMHG (ref 64–87)
PO2 BLDA: 97 MMHG (ref 64–87)
PO2 TEMP ADJ BLDA: 102 MMHG (ref 64–87)
PO2 TEMP ADJ BLDA: 52 MMHG (ref 64–87)
PO2 TEMP ADJ BLDA: 58 MMHG (ref 64–87)
PO2 TEMP ADJ BLDA: 95 MMHG (ref 64–87)
POTASSIUM SERPL-SCNC: 2.9 MMOL/L (ref 3.6–5.5)
POTASSIUM SERPL-SCNC: 3 MMOL/L (ref 3.6–5.5)
POTASSIUM SERPL-SCNC: 3.2 MMOL/L (ref 3.6–5.5)
POTASSIUM SERPL-SCNC: 3.3 MMOL/L (ref 3.6–5.5)
POTASSIUM SERPL-SCNC: 3.3 MMOL/L (ref 3.6–5.5)
POTASSIUM SERPL-SCNC: 3.4 MMOL/L (ref 3.6–5.5)
POTASSIUM SERPL-SCNC: 3.5 MMOL/L (ref 3.6–5.5)
POTASSIUM SERPL-SCNC: 3.6 MMOL/L (ref 3.6–5.5)
POTASSIUM SERPL-SCNC: 3.6 MMOL/L (ref 3.6–5.5)
POTASSIUM SERPL-SCNC: 3.7 MMOL/L (ref 3.6–5.5)
POTASSIUM SERPL-SCNC: 3.8 MMOL/L (ref 3.6–5.5)
POTASSIUM SERPL-SCNC: 3.8 MMOL/L (ref 3.6–5.5)
POTASSIUM SERPL-SCNC: 3.9 MMOL/L (ref 3.6–5.5)
POTASSIUM SERPL-SCNC: 3.9 MMOL/L (ref 3.6–5.5)
POTASSIUM SERPL-SCNC: 4 MMOL/L (ref 3.6–5.5)
POTASSIUM SERPL-SCNC: 4.1 MMOL/L (ref 3.6–5.5)
POTASSIUM SERPL-SCNC: 4.1 MMOL/L (ref 3.6–5.5)
POTASSIUM SERPL-SCNC: 4.2 MMOL/L (ref 3.6–5.5)
POTASSIUM SERPL-SCNC: 4.2 MMOL/L (ref 3.6–5.5)
POTASSIUM SERPL-SCNC: 4.4 MMOL/L (ref 3.6–5.5)
POTASSIUM SERPL-SCNC: 4.4 MMOL/L (ref 3.6–5.5)
POTASSIUM SERPL-SCNC: 4.5 MMOL/L (ref 3.6–5.5)
POTASSIUM SERPL-SCNC: 4.7 MMOL/L (ref 3.6–5.5)
POTASSIUM UR-SCNC: 10.5 MMOL/L
PROCALCITONIN SERPL-MCNC: 0.27 NG/ML
PRODUCT TYPE UPROD: NORMAL
PROT FLD-MCNC: 1 G/DL
PROT FLD-MCNC: 1.3 G/DL
PROT SERPL-MCNC: 5.1 G/DL (ref 6–8.2)
PROT SERPL-MCNC: 5.2 G/DL (ref 6–8.2)
PROT SERPL-MCNC: 5.6 G/DL (ref 6–8.2)
PROT SERPL-MCNC: 5.6 G/DL (ref 6–8.2)
PROT SERPL-MCNC: 5.8 G/DL (ref 6–8.2)
PROT SERPL-MCNC: 5.8 G/DL (ref 6–8.2)
PROT SERPL-MCNC: 5.9 G/DL (ref 6–8.2)
PROT SERPL-MCNC: 5.9 G/DL (ref 6–8.2)
PROT SERPL-MCNC: 6.5 G/DL (ref 6–8.3)
PROT SERPL-MCNC: 6.7 G/DL (ref 6–8.2)
PROT SERPL-MCNC: 6.8 G/DL (ref 6–8.2)
PROT SERPL-MCNC: 6.8 G/DL (ref 6–8.2)
PROT SERPL-MCNC: 7.1 G/DL (ref 6–8.2)
PROT SERPL-MCNC: 8.1 G/DL (ref 6–8.2)
PROT UR QL STRIP: 30 MG/DL
PROT UR QL STRIP: 30 MG/DL
PROT UR QL STRIP: NEGATIVE MG/DL
PROT UR-MCNC: 28.3 MG/DL (ref 0–15)
PROTHROMBIN TIME: 15 SEC (ref 12–14.6)
PROTHROMBIN TIME: 17.4 SEC (ref 12–14.6)
PROTHROMBIN TIME: 17.7 SEC (ref 12–14.6)
PROTHROMBIN TIME: 18.1 SEC (ref 12–14.6)
PROTHROMBIN TIME: 19.1 SEC (ref 12–14.6)
PROTHROMBIN TIME: 21 SEC (ref 12–14.6)
PROTHROMBIN TIME: 24 SEC (ref 12–14.6)
PROTHROMBIN TIME: 25.2 SEC (ref 12–14.6)
PROTHROMBIN TIME: 45.3 SEC (ref 12–14.6)
RBC # BLD AUTO: 0.62 M/UL (ref 4.2–5.4)
RBC # BLD AUTO: 2.35 M/UL (ref 4.2–5.4)
RBC # BLD AUTO: 2.35 M/UL (ref 4.2–5.4)
RBC # BLD AUTO: 2.36 M/UL (ref 4.2–5.4)
RBC # BLD AUTO: 2.42 M/UL (ref 4.2–5.4)
RBC # BLD AUTO: 2.5 M/UL (ref 4.2–5.4)
RBC # BLD AUTO: 2.55 M/UL (ref 4.2–5.4)
RBC # BLD AUTO: 2.57 M/UL (ref 4.2–5.4)
RBC # BLD AUTO: 2.57 M/UL (ref 4.2–5.4)
RBC # BLD AUTO: 2.79 M/UL (ref 4.2–5.4)
RBC # BLD AUTO: 2.79 M/UL (ref 4.2–5.4)
RBC # BLD AUTO: 2.91 M/UL (ref 4.2–5.4)
RBC # BLD AUTO: 3.03 M/UL (ref 4.2–5.4)
RBC # BLD AUTO: 3.09 M/UL (ref 4.2–5.4)
RBC # BLD AUTO: 3.19 M/UL (ref 4.2–5.4)
RBC # BLD AUTO: 3.3 M/UL (ref 4.2–5.4)
RBC # BLD AUTO: 3.6 M/UL (ref 4.2–5.4)
RBC # BLD AUTO: 3.63 M/UL (ref 4.2–5.4)
RBC # FLD: <1 CELLS/UL
RBC # FLD: <2000 CELLS/UL
RBC # URNS HPF: ABNORMAL /HPF
RBC UR QL AUTO: ABNORMAL
RBC UR QL AUTO: ABNORMAL
RBC UR QL AUTO: NEGATIVE
RH BLD: NORMAL
SAO2 % BLDA: 81 % (ref 93–99)
SAO2 % BLDA: 90 % (ref 93–99)
SAO2 % BLDA: 97 % (ref 93–99)
SAO2 % BLDA: 98 % (ref 93–99)
SIGNIFICANT IND 70042: NORMAL
SITE SITE: NORMAL
SODIUM SERPL-SCNC: 122 MMOL/L (ref 135–145)
SODIUM SERPL-SCNC: 122 MMOL/L (ref 135–145)
SODIUM SERPL-SCNC: 123 MMOL/L (ref 135–145)
SODIUM SERPL-SCNC: 125 MMOL/L (ref 135–145)
SODIUM SERPL-SCNC: 126 MMOL/L (ref 135–145)
SODIUM SERPL-SCNC: 127 MMOL/L (ref 135–145)
SODIUM SERPL-SCNC: 129 MMOL/L (ref 135–145)
SODIUM SERPL-SCNC: 130 MMOL/L (ref 135–145)
SODIUM SERPL-SCNC: 131 MMOL/L (ref 135–145)
SODIUM SERPL-SCNC: 131 MMOL/L (ref 135–145)
SODIUM SERPL-SCNC: 132 MMOL/L (ref 135–145)
SODIUM SERPL-SCNC: 133 MMOL/L (ref 135–145)
SODIUM SERPL-SCNC: 135 MMOL/L (ref 135–145)
SODIUM SERPL-SCNC: 135 MMOL/L (ref 135–145)
SODIUM SERPL-SCNC: 136 MMOL/L (ref 135–145)
SODIUM SERPL-SCNC: 137 MMOL/L (ref 135–145)
SODIUM SERPL-SCNC: 139 MMOL/L (ref 135–145)
SODIUM UR-SCNC: <10 MMOL/L
SODIUM UR-SCNC: <10 MMOL/L
SOURCE SOURCE: NORMAL
SP GR UR STRIP.AUTO: 1.01
SP GR UR STRIP.AUTO: ABNORMAL
SPECIMEN DRAWN FROM PATIENT: ABNORMAL
T3 SERPL-MCNC: 38.6 NG/DL (ref 60–181)
T4 FREE SERPL-MCNC: 1.08 NG/DL (ref 0.53–1.43)
TEG ALGORITHM TGALG: ABNORMAL
TIBC SERPL-MCNC: <105 UG/DL (ref 250–450)
TRIGL SERPL-MCNC: 203 MG/DL (ref 0–149)
TRIGL SERPL-MCNC: 48 MG/DL (ref 0–149)
TSH SERPL DL<=0.005 MIU/L-ACNC: 2.12 UIU/ML (ref 0.38–5.33)
TSH SERPL DL<=0.005 MIU/L-ACNC: 2.44 UIU/ML (ref 0.38–5.33)
UNIT STATUS USTAT: NORMAL
UROBILINOGEN UR STRIP.AUTO-MCNC: 1 MG/DL
UROBILINOGEN UR STRIP.AUTO-MCNC: 1 MG/DL
UROBILINOGEN UR STRIP.AUTO-MCNC: 4 MG/DL
VIT B12 SERPL-MCNC: >1500 PG/ML (ref 211–911)
VIT C SERPL-MCNC: <5 UMOL/L (ref 23–114)
WBC # BLD AUTO: 10 K/UL (ref 4.8–10.8)
WBC # BLD AUTO: 12.7 K/UL (ref 4.8–10.8)
WBC # BLD AUTO: 15.6 K/UL (ref 4.8–10.8)
WBC # BLD AUTO: 16.8 K/UL (ref 4.8–10.8)
WBC # BLD AUTO: 17.6 K/UL (ref 4.8–10.8)
WBC # BLD AUTO: 21.4 K/UL (ref 4.8–10.8)
WBC # BLD AUTO: 21.8 K/UL (ref 4.8–10.8)
WBC # BLD AUTO: 23.4 K/UL (ref 4.8–10.8)
WBC # BLD AUTO: 4.3 K/UL (ref 4.8–10.8)
WBC # BLD AUTO: 4.7 K/UL (ref 4.8–10.8)
WBC # BLD AUTO: 5.3 K/UL (ref 4.8–10.8)
WBC # BLD AUTO: 5.4 K/UL (ref 4.8–10.8)
WBC # BLD AUTO: 5.9 K/UL (ref 4.8–10.8)
WBC # BLD AUTO: 6.5 K/UL (ref 4.8–10.8)
WBC # BLD AUTO: 7.1 K/UL (ref 4.8–10.8)
WBC # BLD AUTO: 8 K/UL (ref 4.8–10.8)
WBC # BLD AUTO: 8.2 K/UL (ref 4.8–10.8)
WBC # BLD AUTO: 9 K/UL (ref 4.8–10.8)
WBC # FLD: 127 CELLS/UL
WBC # FLD: 224 CELLS/UL
WBC #/AREA URNS HPF: ABNORMAL /HPF
YEAST HYPHAE #/AREA URNS HPF: PRESENT /HPF

## 2018-01-01 PROCEDURE — A9270 NON-COVERED ITEM OR SERVICE: HCPCS | Performed by: INTERNAL MEDICINE

## 2018-01-01 PROCEDURE — 82042 OTHER SOURCE ALBUMIN QUAN EA: CPT

## 2018-01-01 PROCEDURE — 700105 HCHG RX REV CODE 258: Performed by: INTERNAL MEDICINE

## 2018-01-01 PROCEDURE — 80053 COMPREHEN METABOLIC PANEL: CPT

## 2018-01-01 PROCEDURE — 700102 HCHG RX REV CODE 250 W/ 637 OVERRIDE(OP): Performed by: INTERNAL MEDICINE

## 2018-01-01 PROCEDURE — 97535 SELF CARE MNGMENT TRAINING: CPT

## 2018-01-01 PROCEDURE — A9270 NON-COVERED ITEM OR SERVICE: HCPCS | Performed by: HOSPITALIST

## 2018-01-01 PROCEDURE — 700111 HCHG RX REV CODE 636 W/ 250 OVERRIDE (IP): Performed by: INTERNAL MEDICINE

## 2018-01-01 PROCEDURE — 700105 HCHG RX REV CODE 258: Performed by: STUDENT IN AN ORGANIZED HEALTH CARE EDUCATION/TRAINING PROGRAM

## 2018-01-01 PROCEDURE — 80048 BASIC METABOLIC PNL TOTAL CA: CPT | Mod: 91

## 2018-01-01 PROCEDURE — 82803 BLOOD GASES ANY COMBINATION: CPT

## 2018-01-01 PROCEDURE — 85025 COMPLETE CBC W/AUTO DIFF WBC: CPT

## 2018-01-01 PROCEDURE — 700111 HCHG RX REV CODE 636 W/ 250 OVERRIDE (IP): Performed by: HOSPITALIST

## 2018-01-01 PROCEDURE — 770006 HCHG ROOM/CARE - MED/SURG/GYN SEMI*

## 2018-01-01 PROCEDURE — 86901 BLOOD TYPING SEROLOGIC RH(D): CPT

## 2018-01-01 PROCEDURE — 99285 EMERGENCY DEPT VISIT HI MDM: CPT

## 2018-01-01 PROCEDURE — 83605 ASSAY OF LACTIC ACID: CPT

## 2018-01-01 PROCEDURE — 85384 FIBRINOGEN ACTIVITY: CPT

## 2018-01-01 PROCEDURE — 770020 HCHG ROOM/CARE - TELE (206)

## 2018-01-01 PROCEDURE — 82570 ASSAY OF URINE CREATININE: CPT

## 2018-01-01 PROCEDURE — C9113 INJ PANTOPRAZOLE SODIUM, VIA: HCPCS | Performed by: INTERNAL MEDICINE

## 2018-01-01 PROCEDURE — 700102 HCHG RX REV CODE 250 W/ 637 OVERRIDE(OP): Performed by: HOSPITALIST

## 2018-01-01 PROCEDURE — 700111 HCHG RX REV CODE 636 W/ 250 OVERRIDE (IP): Performed by: STUDENT IN AN ORGANIZED HEALTH CARE EDUCATION/TRAINING PROGRAM

## 2018-01-01 PROCEDURE — 99233 SBSQ HOSP IP/OBS HIGH 50: CPT | Performed by: HOSPITALIST

## 2018-01-01 PROCEDURE — 81001 URINALYSIS AUTO W/SCOPE: CPT

## 2018-01-01 PROCEDURE — 84100 ASSAY OF PHOSPHORUS: CPT

## 2018-01-01 PROCEDURE — 31500 INSERT EMERGENCY AIRWAY: CPT

## 2018-01-01 PROCEDURE — 84133 ASSAY OF URINE POTASSIUM: CPT

## 2018-01-01 PROCEDURE — 82962 GLUCOSE BLOOD TEST: CPT

## 2018-01-01 PROCEDURE — 31500 INSERT EMERGENCY AIRWAY: CPT | Performed by: HOSPITALIST

## 2018-01-01 PROCEDURE — 36415 COLL VENOUS BLD VENIPUNCTURE: CPT

## 2018-01-01 PROCEDURE — 700105 HCHG RX REV CODE 258: Performed by: HOSPITALIST

## 2018-01-01 PROCEDURE — 700117 HCHG RX CONTRAST REV CODE 255: Performed by: HOSPITALIST

## 2018-01-01 PROCEDURE — 30243R1 TRANSFUSION OF NONAUTOLOGOUS PLATELETS INTO CENTRAL VEIN, PERCUTANEOUS APPROACH: ICD-10-PCS | Performed by: HOSPITALIST

## 2018-01-01 PROCEDURE — 82436 ASSAY OF URINE CHLORIDE: CPT

## 2018-01-01 PROCEDURE — 99233 SBSQ HOSP IP/OBS HIGH 50: CPT | Performed by: INTERNAL MEDICINE

## 2018-01-01 PROCEDURE — 36600 WITHDRAWAL OF ARTERIAL BLOOD: CPT

## 2018-01-01 PROCEDURE — A9270 NON-COVERED ITEM OR SERVICE: HCPCS | Performed by: STUDENT IN AN ORGANIZED HEALTH CARE EDUCATION/TRAINING PROGRAM

## 2018-01-01 PROCEDURE — 85730 THROMBOPLASTIN TIME PARTIAL: CPT

## 2018-01-01 PROCEDURE — P9017 PLASMA 1 DONOR FRZ W/IN 8 HR: HCPCS

## 2018-01-01 PROCEDURE — 87070 CULTURE OTHR SPECIMN AEROBIC: CPT

## 2018-01-01 PROCEDURE — 88112 CYTOPATH CELL ENHANCE TECH: CPT

## 2018-01-01 PROCEDURE — 86923 COMPATIBILITY TEST ELECTRIC: CPT

## 2018-01-01 PROCEDURE — 700101 HCHG RX REV CODE 250: Performed by: INTERNAL MEDICINE

## 2018-01-01 PROCEDURE — 99291 CRITICAL CARE FIRST HOUR: CPT | Performed by: INTERNAL MEDICINE

## 2018-01-01 PROCEDURE — 74177 CT ABD & PELVIS W/CONTRAST: CPT

## 2018-01-01 PROCEDURE — 302146: Performed by: HOSPITALIST

## 2018-01-01 PROCEDURE — 700111 HCHG RX REV CODE 636 W/ 250 OVERRIDE (IP)

## 2018-01-01 PROCEDURE — 82272 OCCULT BLD FECES 1-3 TESTS: CPT

## 2018-01-01 PROCEDURE — 700102 HCHG RX REV CODE 250 W/ 637 OVERRIDE(OP): Performed by: STUDENT IN AN ORGANIZED HEALTH CARE EDUCATION/TRAINING PROGRAM

## 2018-01-01 PROCEDURE — 94002 VENT MGMT INPAT INIT DAY: CPT

## 2018-01-01 PROCEDURE — 85576 BLOOD PLATELET AGGREGATION: CPT | Mod: 91

## 2018-01-01 PROCEDURE — 85347 COAGULATION TIME ACTIVATED: CPT

## 2018-01-01 PROCEDURE — 76700 US EXAM ABDOM COMPLETE: CPT

## 2018-01-01 PROCEDURE — 82140 ASSAY OF AMMONIA: CPT

## 2018-01-01 PROCEDURE — 71045 X-RAY EXAM CHEST 1 VIEW: CPT

## 2018-01-01 PROCEDURE — 49083 ABD PARACENTESIS W/IMAGING: CPT

## 2018-01-01 PROCEDURE — G0378 HOSPITAL OBSERVATION PER HR: HCPCS

## 2018-01-01 PROCEDURE — 82150 ASSAY OF AMYLASE: CPT

## 2018-01-01 PROCEDURE — P9016 RBC LEUKOCYTES REDUCED: HCPCS | Mod: 91

## 2018-01-01 PROCEDURE — 700101 HCHG RX REV CODE 250: Performed by: EMERGENCY MEDICINE

## 2018-01-01 PROCEDURE — 99232 SBSQ HOSP IP/OBS MODERATE 35: CPT | Performed by: INTERNAL MEDICINE

## 2018-01-01 PROCEDURE — 85610 PROTHROMBIN TIME: CPT

## 2018-01-01 PROCEDURE — 86900 BLOOD TYPING SEROLOGIC ABO: CPT

## 2018-01-01 PROCEDURE — 82533 TOTAL CORTISOL: CPT

## 2018-01-01 PROCEDURE — 83735 ASSAY OF MAGNESIUM: CPT

## 2018-01-01 PROCEDURE — 85027 COMPLETE CBC AUTOMATED: CPT | Mod: 91

## 2018-01-01 PROCEDURE — 83550 IRON BINDING TEST: CPT

## 2018-01-01 PROCEDURE — 83690 ASSAY OF LIPASE: CPT

## 2018-01-01 PROCEDURE — 80048 BASIC METABOLIC PNL TOTAL CA: CPT

## 2018-01-01 PROCEDURE — 85027 COMPLETE CBC AUTOMATED: CPT

## 2018-01-01 PROCEDURE — 76830 TRANSVAGINAL US NON-OB: CPT

## 2018-01-01 PROCEDURE — 84478 ASSAY OF TRIGLYCERIDES: CPT

## 2018-01-01 PROCEDURE — 99292 CRITICAL CARE ADDL 30 MIN: CPT | Performed by: INTERNAL MEDICINE

## 2018-01-01 PROCEDURE — P9047 ALBUMIN (HUMAN), 25%, 50ML: HCPCS | Performed by: HOSPITALIST

## 2018-01-01 PROCEDURE — 84156 ASSAY OF PROTEIN URINE: CPT

## 2018-01-01 PROCEDURE — 93306 TTE W/DOPPLER COMPLETE: CPT

## 2018-01-01 PROCEDURE — 99152 MOD SED SAME PHYS/QHP 5/>YRS: CPT

## 2018-01-01 PROCEDURE — 80307 DRUG TEST PRSMV CHEM ANLYZR: CPT

## 2018-01-01 PROCEDURE — 96375 TX/PRO/DX INJ NEW DRUG ADDON: CPT

## 2018-01-01 PROCEDURE — 700111 HCHG RX REV CODE 636 W/ 250 OVERRIDE (IP): Performed by: EMERGENCY MEDICINE

## 2018-01-01 PROCEDURE — 83930 ASSAY OF BLOOD OSMOLALITY: CPT

## 2018-01-01 PROCEDURE — 87086 URINE CULTURE/COLONY COUNT: CPT

## 2018-01-01 PROCEDURE — 304561 HCHG STAT O2

## 2018-01-01 PROCEDURE — 86850 RBC ANTIBODY SCREEN: CPT

## 2018-01-01 PROCEDURE — 76775 US EXAM ABDO BACK WALL LIM: CPT

## 2018-01-01 PROCEDURE — 770022 HCHG ROOM/CARE - ICU (200)

## 2018-01-01 PROCEDURE — 72100 X-RAY EXAM L-S SPINE 2/3 VWS: CPT

## 2018-01-01 PROCEDURE — 83935 ASSAY OF URINE OSMOLALITY: CPT

## 2018-01-01 PROCEDURE — P9047 ALBUMIN (HUMAN), 25%, 50ML: HCPCS | Performed by: INTERNAL MEDICINE

## 2018-01-01 PROCEDURE — 36430 TRANSFUSION BLD/BLD COMPNT: CPT

## 2018-01-01 PROCEDURE — C1729 CATH, DRAINAGE: HCPCS | Performed by: EMERGENCY MEDICINE

## 2018-01-01 PROCEDURE — 0D9W3ZZ DRAINAGE OF PERITONEUM, PERCUTANEOUS APPROACH: ICD-10-PCS | Performed by: RADIOLOGY

## 2018-01-01 PROCEDURE — 30243K1 TRANSFUSION OF NONAUTOLOGOUS FROZEN PLASMA INTO CENTRAL VEIN, PERCUTANEOUS APPROACH: ICD-10-PCS | Performed by: HOSPITALIST

## 2018-01-01 PROCEDURE — 84300 ASSAY OF URINE SODIUM: CPT

## 2018-01-01 PROCEDURE — 93306 TTE W/DOPPLER COMPLETE: CPT | Mod: 26 | Performed by: INTERNAL MEDICINE

## 2018-01-01 PROCEDURE — 30243N1 TRANSFUSION OF NONAUTOLOGOUS RED BLOOD CELLS INTO CENTRAL VEIN, PERCUTANEOUS APPROACH: ICD-10-PCS | Performed by: HOSPITALIST

## 2018-01-01 PROCEDURE — 84157 ASSAY OF PROTEIN OTHER: CPT

## 2018-01-01 PROCEDURE — 84145 PROCALCITONIN (PCT): CPT

## 2018-01-01 PROCEDURE — 99223 1ST HOSP IP/OBS HIGH 75: CPT | Performed by: HOSPITALIST

## 2018-01-01 PROCEDURE — 307738 PARACENTESIS ABDOMINAL KIT: Performed by: EMERGENCY MEDICINE

## 2018-01-01 PROCEDURE — 99255 IP/OBS CONSLTJ NEW/EST HI 80: CPT | Performed by: INTERNAL MEDICINE

## 2018-01-01 PROCEDURE — 94003 VENT MGMT INPAT SUBQ DAY: CPT

## 2018-01-01 PROCEDURE — 82270 OCCULT BLOOD FECES: CPT

## 2018-01-01 PROCEDURE — 84439 ASSAY OF FREE THYROXINE: CPT

## 2018-01-01 PROCEDURE — 36556 INSERT NON-TUNNEL CV CATH: CPT

## 2018-01-01 PROCEDURE — 87040 BLOOD CULTURE FOR BACTERIA: CPT | Mod: 91

## 2018-01-01 PROCEDURE — 83880 ASSAY OF NATRIURETIC PEPTIDE: CPT

## 2018-01-01 PROCEDURE — 87205 SMEAR GRAM STAIN: CPT

## 2018-01-01 PROCEDURE — P9047 ALBUMIN (HUMAN), 25%, 50ML: HCPCS | Performed by: STUDENT IN AN ORGANIZED HEALTH CARE EDUCATION/TRAINING PROGRAM

## 2018-01-01 PROCEDURE — 82105 ALPHA-FETOPROTEIN SERUM: CPT

## 2018-01-01 PROCEDURE — 76705 ECHO EXAM OF ABDOMEN: CPT

## 2018-01-01 PROCEDURE — 84443 ASSAY THYROID STIM HORMONE: CPT

## 2018-01-01 PROCEDURE — 99223 1ST HOSP IP/OBS HIGH 75: CPT | Mod: GC | Performed by: INTERNAL MEDICINE

## 2018-01-01 PROCEDURE — 87015 SPECIMEN INFECT AGNT CONCNTJ: CPT

## 2018-01-01 PROCEDURE — 02HV33Z INSERTION OF INFUSION DEVICE INTO SUPERIOR VENA CAVA, PERCUTANEOUS APPROACH: ICD-10-PCS | Performed by: HOSPITALIST

## 2018-01-01 PROCEDURE — 80076 HEPATIC FUNCTION PANEL: CPT

## 2018-01-01 PROCEDURE — 89051 BODY FLUID CELL COUNT: CPT

## 2018-01-01 PROCEDURE — 307738 PARACENTESIS ABDOMINAL KIT: Performed by: STUDENT IN AN ORGANIZED HEALTH CARE EDUCATION/TRAINING PROGRAM

## 2018-01-01 PROCEDURE — 99239 HOSP IP/OBS DSCHRG MGMT >30: CPT | Mod: GC | Performed by: INTERNAL MEDICINE

## 2018-01-01 PROCEDURE — B548ZZA ULTRASONOGRAPHY OF SUPERIOR VENA CAVA, GUIDANCE: ICD-10-PCS | Performed by: HOSPITALIST

## 2018-01-01 PROCEDURE — 700101 HCHG RX REV CODE 250: Performed by: HOSPITALIST

## 2018-01-01 PROCEDURE — 84160 ASSAY OF PROTEIN ANY SOURCE: CPT

## 2018-01-01 PROCEDURE — 82607 VITAMIN B-12: CPT

## 2018-01-01 PROCEDURE — 93010 ELECTROCARDIOGRAM REPORT: CPT | Performed by: INTERNAL MEDICINE

## 2018-01-01 PROCEDURE — 96365 THER/PROPH/DIAG IV INF INIT: CPT

## 2018-01-01 PROCEDURE — 72158 MRI LUMBAR SPINE W/O & W/DYE: CPT

## 2018-01-01 PROCEDURE — 80074 ACUTE HEPATITIS PANEL: CPT

## 2018-01-01 PROCEDURE — 99232 SBSQ HOSP IP/OBS MODERATE 35: CPT | Mod: GC | Performed by: INTERNAL MEDICINE

## 2018-01-01 PROCEDURE — 82040 ASSAY OF SERUM ALBUMIN: CPT

## 2018-01-01 PROCEDURE — 36556 INSERT NON-TUNNEL CV CATH: CPT | Performed by: HOSPITALIST

## 2018-01-01 PROCEDURE — 302214 INTUBATION BOX: Performed by: HOSPITALIST

## 2018-01-01 PROCEDURE — 85576 BLOOD PLATELET AGGREGATION: CPT

## 2018-01-01 PROCEDURE — 82306 VITAMIN D 25 HYDROXY: CPT

## 2018-01-01 PROCEDURE — 99239 HOSP IP/OBS DSCHRG MGMT >30: CPT | Performed by: INTERNAL MEDICINE

## 2018-01-01 PROCEDURE — 84165 PROTEIN E-PHORESIS SERUM: CPT

## 2018-01-01 PROCEDURE — 99291 CRITICAL CARE FIRST HOUR: CPT | Mod: 25 | Performed by: HOSPITALIST

## 2018-01-01 PROCEDURE — 87040 BLOOD CULTURE FOR BACTERIA: CPT

## 2018-01-01 PROCEDURE — 93005 ELECTROCARDIOGRAM TRACING: CPT | Performed by: HOSPITALIST

## 2018-01-01 PROCEDURE — 700105 HCHG RX REV CODE 258: Performed by: EMERGENCY MEDICINE

## 2018-01-01 PROCEDURE — 93970 EXTREMITY STUDY: CPT

## 2018-01-01 PROCEDURE — 5A1945Z RESPIRATORY VENTILATION, 24-96 CONSECUTIVE HOURS: ICD-10-PCS | Performed by: HOSPITALIST

## 2018-01-01 PROCEDURE — 72157 MRI CHEST SPINE W/O & W/DYE: CPT

## 2018-01-01 PROCEDURE — HZ2ZZZZ DETOXIFICATION SERVICES FOR SUBSTANCE ABUSE TREATMENT: ICD-10-PCS | Performed by: EMERGENCY MEDICINE

## 2018-01-01 PROCEDURE — 96374 THER/PROPH/DIAG INJ IV PUSH: CPT

## 2018-01-01 PROCEDURE — 82180 ASSAY OF ASCORBIC ACID: CPT

## 2018-01-01 PROCEDURE — 93005 ELECTROCARDIOGRAM TRACING: CPT | Performed by: EMERGENCY MEDICINE

## 2018-01-01 PROCEDURE — C1751 CATH, INF, PER/CENT/MIDLINE: HCPCS

## 2018-01-01 PROCEDURE — 700117 HCHG RX CONTRAST REV CODE 255: Performed by: SPECIALIST

## 2018-01-01 PROCEDURE — 0W9G3ZZ DRAINAGE OF PERITONEAL CAVITY, PERCUTANEOUS APPROACH: ICD-10-PCS | Performed by: EMERGENCY MEDICINE

## 2018-01-01 PROCEDURE — 99153 MOD SED SAME PHYS/QHP EA: CPT

## 2018-01-01 PROCEDURE — P9034 PLATELETS, PHERESIS: HCPCS

## 2018-01-01 PROCEDURE — 96361 HYDRATE IV INFUSION ADD-ON: CPT

## 2018-01-01 PROCEDURE — HZ2ZZZZ DETOXIFICATION SERVICES FOR SUBSTANCE ABUSE TREATMENT: ICD-10-PCS | Performed by: HOSPITALIST

## 2018-01-01 PROCEDURE — 84480 ASSAY TRIIODOTHYRONINE (T3): CPT

## 2018-01-01 PROCEDURE — 82728 ASSAY OF FERRITIN: CPT

## 2018-01-01 PROCEDURE — 86304 IMMUNOASSAY TUMOR CA 125: CPT

## 2018-01-01 PROCEDURE — A9585 GADOBUTROL INJECTION: HCPCS | Performed by: HOSPITALIST

## 2018-01-01 PROCEDURE — 94760 N-INVAS EAR/PLS OXIMETRY 1: CPT

## 2018-01-01 PROCEDURE — 83540 ASSAY OF IRON: CPT

## 2018-01-01 PROCEDURE — 0BH17EZ INSERTION OF ENDOTRACHEAL AIRWAY INTO TRACHEA, VIA NATURAL OR ARTIFICIAL OPENING: ICD-10-PCS | Performed by: HOSPITALIST

## 2018-01-01 RX ORDER — DEXTROSE MONOHYDRATE 25 G/50ML
25 INJECTION, SOLUTION INTRAVENOUS
Status: DISCONTINUED | OUTPATIENT
Start: 2018-01-01 | End: 2018-01-01

## 2018-01-01 RX ORDER — OMEPRAZOLE 20 MG/1
40 CAPSULE, DELAYED RELEASE ORAL DAILY
Status: DISCONTINUED | OUTPATIENT
Start: 2018-01-01 | End: 2018-01-01

## 2018-01-01 RX ORDER — DIPHENHYDRAMINE HCL 25 MG
25 TABLET ORAL EVERY 8 HOURS PRN
Status: DISCONTINUED | OUTPATIENT
Start: 2018-01-01 | End: 2018-01-01 | Stop reason: HOSPADM

## 2018-01-01 RX ORDER — OXYCODONE HYDROCHLORIDE 10 MG/1
10 TABLET ORAL
Status: DISCONTINUED | OUTPATIENT
Start: 2018-01-01 | End: 2018-01-01

## 2018-01-01 RX ORDER — THIAMINE MONONITRATE (VIT B1) 100 MG
100 TABLET ORAL DAILY
Status: DISCONTINUED | OUTPATIENT
Start: 2018-01-01 | End: 2018-01-01

## 2018-01-01 RX ORDER — LORAZEPAM 2 MG/ML
1-5 INJECTION INTRAMUSCULAR
Status: DISCONTINUED | OUTPATIENT
Start: 2018-01-01 | End: 2018-01-01 | Stop reason: HOSPADM

## 2018-01-01 RX ORDER — SODIUM CHLORIDE 9 MG/ML
500 INJECTION, SOLUTION INTRAVENOUS ONCE
Status: COMPLETED | OUTPATIENT
Start: 2018-01-01 | End: 2018-01-01

## 2018-01-01 RX ORDER — ONDANSETRON 4 MG/1
4 TABLET, ORALLY DISINTEGRATING ORAL EVERY 4 HOURS PRN
Status: DISCONTINUED | OUTPATIENT
Start: 2018-01-01 | End: 2018-01-01 | Stop reason: HOSPADM

## 2018-01-01 RX ORDER — SODIUM BICARBONATE 650 MG/1
1300 TABLET ORAL 3 TIMES DAILY
Status: DISCONTINUED | OUTPATIENT
Start: 2018-01-01 | End: 2018-01-01

## 2018-01-01 RX ORDER — SUCCINYLCHOLINE CHLORIDE 20 MG/ML
100 INJECTION INTRAMUSCULAR; INTRAVENOUS ONCE
Status: COMPLETED | OUTPATIENT
Start: 2018-01-01 | End: 2018-01-01

## 2018-01-01 RX ORDER — SODIUM CHLORIDE AND POTASSIUM CHLORIDE 150; 900 MG/100ML; MG/100ML
INJECTION, SOLUTION INTRAVENOUS CONTINUOUS
Status: DISCONTINUED | OUTPATIENT
Start: 2018-01-01 | End: 2018-01-01

## 2018-01-01 RX ORDER — ASCORBIC ACID 500 MG
500 TABLET ORAL DAILY
Status: DISCONTINUED | OUTPATIENT
Start: 2018-01-01 | End: 2018-01-01

## 2018-01-01 RX ORDER — MAGNESIUM SULFATE HEPTAHYDRATE 40 MG/ML
4 INJECTION, SOLUTION INTRAVENOUS ONCE
Status: COMPLETED | OUTPATIENT
Start: 2018-01-01 | End: 2018-01-01

## 2018-01-01 RX ORDER — ALBUMIN (HUMAN) 12.5 G/50ML
12.5 SOLUTION INTRAVENOUS ONCE
Status: COMPLETED | OUTPATIENT
Start: 2018-01-01 | End: 2018-01-01

## 2018-01-01 RX ORDER — MORPHINE SULFATE 4 MG/ML
4 INJECTION, SOLUTION INTRAMUSCULAR; INTRAVENOUS
Status: DISCONTINUED | OUTPATIENT
Start: 2018-01-01 | End: 2018-01-01

## 2018-01-01 RX ORDER — PROPRANOLOL HYDROCHLORIDE 10 MG/1
10 TABLET ORAL 2 TIMES DAILY
Qty: 60 TAB | Refills: 1 | Status: SHIPPED | OUTPATIENT
Start: 2018-01-01

## 2018-01-01 RX ORDER — ASCORBIC ACID 500 MG
1000 TABLET ORAL DAILY
Status: DISCONTINUED | OUTPATIENT
Start: 2018-01-01 | End: 2018-01-01 | Stop reason: HOSPADM

## 2018-01-01 RX ORDER — FOLIC ACID 1 MG/1
1 TABLET ORAL DAILY
Status: DISCONTINUED | OUTPATIENT
Start: 2018-01-01 | End: 2018-01-01

## 2018-01-01 RX ORDER — OMEPRAZOLE 20 MG/1
40 CAPSULE, DELAYED RELEASE ORAL DAILY
Status: DISCONTINUED | OUTPATIENT
Start: 2018-01-01 | End: 2018-01-01 | Stop reason: HOSPADM

## 2018-01-01 RX ORDER — LORAZEPAM 1 MG/1
2 TABLET ORAL
Status: DISCONTINUED | OUTPATIENT
Start: 2018-01-01 | End: 2018-01-01

## 2018-01-01 RX ORDER — AMOXICILLIN 250 MG
2 CAPSULE ORAL 2 TIMES DAILY
Status: DISCONTINUED | OUTPATIENT
Start: 2018-01-01 | End: 2018-01-01 | Stop reason: HOSPADM

## 2018-01-01 RX ORDER — LABETALOL HYDROCHLORIDE 5 MG/ML
10 INJECTION, SOLUTION INTRAVENOUS
Status: DISCONTINUED | OUTPATIENT
Start: 2018-01-01 | End: 2018-01-01

## 2018-01-01 RX ORDER — MIDODRINE HYDROCHLORIDE 5 MG/1
10 TABLET ORAL
Status: DISCONTINUED | OUTPATIENT
Start: 2018-01-01 | End: 2018-01-01 | Stop reason: HOSPADM

## 2018-01-01 RX ORDER — THIAMINE MONONITRATE (VIT B1) 100 MG
100 TABLET ORAL DAILY
Status: DISCONTINUED | OUTPATIENT
Start: 2018-01-01 | End: 2018-01-01 | Stop reason: HOSPADM

## 2018-01-01 RX ORDER — MIDODRINE HYDROCHLORIDE 5 MG/1
5 TABLET ORAL
Status: DISCONTINUED | OUTPATIENT
Start: 2018-01-01 | End: 2018-01-01

## 2018-01-01 RX ORDER — ONDANSETRON 2 MG/ML
4 INJECTION INTRAMUSCULAR; INTRAVENOUS EVERY 4 HOURS PRN
Status: DISCONTINUED | OUTPATIENT
Start: 2018-01-01 | End: 2018-01-01 | Stop reason: HOSPADM

## 2018-01-01 RX ORDER — PREDNISONE 20 MG/1
20 TABLET ORAL 3 TIMES DAILY
Status: DISCONTINUED | OUTPATIENT
Start: 2018-01-01 | End: 2018-01-01 | Stop reason: HOSPADM

## 2018-01-01 RX ORDER — MIDODRINE HYDROCHLORIDE 10 MG/1
10 TABLET ORAL
Qty: 60 TAB | Refills: 0 | Status: SHIPPED | OUTPATIENT
Start: 2018-01-01 | End: 2018-01-01

## 2018-01-01 RX ORDER — LORAZEPAM 2 MG/ML
1 INJECTION INTRAMUSCULAR
Status: DISCONTINUED | OUTPATIENT
Start: 2018-01-01 | End: 2018-01-01

## 2018-01-01 RX ORDER — ATROPINE SULFATE 10 MG/ML
2 SOLUTION/ DROPS OPHTHALMIC EVERY 4 HOURS PRN
Status: DISCONTINUED | OUTPATIENT
Start: 2018-01-01 | End: 2018-01-01 | Stop reason: HOSPADM

## 2018-01-01 RX ORDER — SPIRONOLACTONE 100 MG/1
100 TABLET, FILM COATED ORAL DAILY
Status: DISCONTINUED | OUTPATIENT
Start: 2018-01-01 | End: 2018-01-01

## 2018-01-01 RX ORDER — MAGNESIUM SULFATE 1 G/100ML
1 INJECTION INTRAVENOUS ONCE
Status: COMPLETED | OUTPATIENT
Start: 2018-01-01 | End: 2018-01-01

## 2018-01-01 RX ORDER — MORPHINE SULFATE 100 MG/5ML
10 SOLUTION ORAL
Status: DISCONTINUED | OUTPATIENT
Start: 2018-01-01 | End: 2018-01-01 | Stop reason: HOSPADM

## 2018-01-01 RX ORDER — CLONAZEPAM 1 MG/1
1 TABLET ORAL 2 TIMES DAILY
Status: DISCONTINUED | OUTPATIENT
Start: 2018-01-01 | End: 2018-01-01 | Stop reason: HOSPADM

## 2018-01-01 RX ORDER — SPIRONOLACTONE 100 MG/1
100 TABLET, FILM COATED ORAL DAILY
Status: ON HOLD | COMMUNITY
End: 2018-01-01

## 2018-01-01 RX ORDER — MIDODRINE HYDROCHLORIDE 5 MG/1
10 TABLET ORAL
Status: DISCONTINUED | OUTPATIENT
Start: 2018-01-01 | End: 2018-01-01

## 2018-01-01 RX ORDER — PREDNISONE 50 MG/1
50 TABLET ORAL DAILY
Qty: 30 TAB | Refills: 0 | Status: SHIPPED | OUTPATIENT
Start: 2018-01-01

## 2018-01-01 RX ORDER — ACETAMINOPHEN 325 MG/1
500 TABLET ORAL EVERY 4 HOURS PRN
Status: DISCONTINUED | OUTPATIENT
Start: 2018-01-01 | End: 2018-01-01

## 2018-01-01 RX ORDER — LORAZEPAM 1 MG/1
1 TABLET ORAL EVERY 4 HOURS PRN
Status: DISCONTINUED | OUTPATIENT
Start: 2018-01-01 | End: 2018-01-01

## 2018-01-01 RX ORDER — MIDODRINE HYDROCHLORIDE 5 MG/1
2.5 TABLET ORAL 2 TIMES DAILY
Status: DISCONTINUED | OUTPATIENT
Start: 2018-01-01 | End: 2018-01-01

## 2018-01-01 RX ORDER — LORAZEPAM 2 MG/ML
0.5 INJECTION INTRAMUSCULAR EVERY 4 HOURS PRN
Status: DISCONTINUED | OUTPATIENT
Start: 2018-01-01 | End: 2018-01-01

## 2018-01-01 RX ORDER — PREDNISONE 50 MG/1
50 TABLET ORAL DAILY
Status: DISCONTINUED | OUTPATIENT
Start: 2018-01-01 | End: 2018-01-01 | Stop reason: HOSPADM

## 2018-01-01 RX ORDER — LANOLIN ALCOHOL/MO/W.PET/CERES
100 CREAM (GRAM) TOPICAL DAILY
Qty: 30 TAB | Refills: 1 | Status: SHIPPED | OUTPATIENT
Start: 2018-01-01

## 2018-01-01 RX ORDER — OMEPRAZOLE 40 MG/1
40 CAPSULE, DELAYED RELEASE ORAL DAILY
COMMUNITY

## 2018-01-01 RX ORDER — LEVOTHYROXINE SODIUM 0.03 MG/1
25 TABLET ORAL
Status: DISCONTINUED | OUTPATIENT
Start: 2018-01-01 | End: 2018-01-01 | Stop reason: HOSPADM

## 2018-01-01 RX ORDER — MORPHINE SULFATE 10 MG/ML
INJECTION, SOLUTION INTRAMUSCULAR; INTRAVENOUS
Status: COMPLETED
Start: 2018-01-01 | End: 2018-01-01

## 2018-01-01 RX ORDER — LACTULOSE 20 G/30ML
30 SOLUTION ORAL 3 TIMES DAILY
Status: DISCONTINUED | OUTPATIENT
Start: 2018-01-01 | End: 2018-01-01

## 2018-01-01 RX ORDER — ALBUMIN (HUMAN) 12.5 G/50ML
50 SOLUTION INTRAVENOUS ONCE
Status: DISCONTINUED | OUTPATIENT
Start: 2018-01-01 | End: 2018-01-01

## 2018-01-01 RX ORDER — GABAPENTIN 300 MG/1
300 CAPSULE ORAL 3 TIMES DAILY
Status: DISCONTINUED | OUTPATIENT
Start: 2018-01-01 | End: 2018-01-01

## 2018-01-01 RX ORDER — POTASSIUM CHLORIDE 20 MEQ/1
40 TABLET, EXTENDED RELEASE ORAL DAILY
Status: DISCONTINUED | OUTPATIENT
Start: 2018-01-01 | End: 2018-01-01 | Stop reason: HOSPADM

## 2018-01-01 RX ORDER — LORAZEPAM 2 MG/ML
2 INJECTION INTRAMUSCULAR
Status: DISCONTINUED | OUTPATIENT
Start: 2018-01-01 | End: 2018-01-01

## 2018-01-01 RX ORDER — BISACODYL 10 MG
10 SUPPOSITORY, RECTAL RECTAL
Status: DISCONTINUED | OUTPATIENT
Start: 2018-01-01 | End: 2018-01-01

## 2018-01-01 RX ORDER — PREDNISONE 20 MG/1
40 TABLET ORAL DAILY
Status: DISCONTINUED | OUTPATIENT
Start: 2018-01-01 | End: 2018-01-01

## 2018-01-01 RX ORDER — FLUDROCORTISONE ACETATE 0.1 MG/1
0.2 TABLET ORAL EVERY MORNING
Status: DISCONTINUED | OUTPATIENT
Start: 2018-01-01 | End: 2018-01-01

## 2018-01-01 RX ORDER — LORAZEPAM 2 MG/ML
1.5 INJECTION INTRAMUSCULAR
Status: DISCONTINUED | OUTPATIENT
Start: 2018-01-01 | End: 2018-01-01

## 2018-01-01 RX ORDER — LORAZEPAM 2 MG/ML
0.5 INJECTION INTRAMUSCULAR EVERY 4 HOURS PRN
Status: DISCONTINUED | OUTPATIENT
Start: 2018-01-01 | End: 2018-01-01 | Stop reason: HOSPADM

## 2018-01-01 RX ORDER — PROMETHAZINE HYDROCHLORIDE 25 MG/1
12.5-25 TABLET ORAL EVERY 4 HOURS PRN
Status: DISCONTINUED | OUTPATIENT
Start: 2018-01-01 | End: 2018-01-01 | Stop reason: HOSPADM

## 2018-01-01 RX ORDER — ALBUMIN (HUMAN) 12.5 G/50ML
62.5 SOLUTION INTRAVENOUS
Status: DISCONTINUED | OUTPATIENT
Start: 2018-01-01 | End: 2018-01-01

## 2018-01-01 RX ORDER — IPRATROPIUM BROMIDE AND ALBUTEROL SULFATE 2.5; .5 MG/3ML; MG/3ML
3 SOLUTION RESPIRATORY (INHALATION)
Status: DISCONTINUED | OUTPATIENT
Start: 2018-01-01 | End: 2018-01-01

## 2018-01-01 RX ORDER — AMOXICILLIN 250 MG
2 CAPSULE ORAL 2 TIMES DAILY
Status: DISCONTINUED | OUTPATIENT
Start: 2018-01-01 | End: 2018-01-01

## 2018-01-01 RX ORDER — MORPHINE SULFATE 10 MG/ML
5 INJECTION, SOLUTION INTRAMUSCULAR; INTRAVENOUS
Status: DISCONTINUED | OUTPATIENT
Start: 2018-01-01 | End: 2018-01-01 | Stop reason: HOSPADM

## 2018-01-01 RX ORDER — ALBUMIN (HUMAN) 12.5 G/50ML
25 SOLUTION INTRAVENOUS ONCE
Status: COMPLETED | OUTPATIENT
Start: 2018-01-01 | End: 2018-01-01

## 2018-01-01 RX ORDER — ALBUMIN (HUMAN) 12.5 G/50ML
50 SOLUTION INTRAVENOUS ONCE
Status: COMPLETED | OUTPATIENT
Start: 2018-01-01 | End: 2018-01-01

## 2018-01-01 RX ORDER — LORAZEPAM 1 MG/1
4 TABLET ORAL
Status: DISCONTINUED | OUTPATIENT
Start: 2018-01-01 | End: 2018-01-01 | Stop reason: HOSPADM

## 2018-01-01 RX ORDER — LORAZEPAM 2 MG/ML
INJECTION INTRAMUSCULAR
Status: DISCONTINUED
Start: 2018-01-01 | End: 2018-01-01 | Stop reason: HOSPADM

## 2018-01-01 RX ORDER — PREDNISONE 10 MG/1
10 TABLET ORAL DAILY
Qty: 30 TAB | Refills: 0 | Status: SHIPPED | OUTPATIENT
Start: 2018-01-01 | End: 2018-01-01

## 2018-01-01 RX ORDER — POLYETHYLENE GLYCOL 3350 17 G/17G
1 POWDER, FOR SOLUTION ORAL
Status: DISCONTINUED | OUTPATIENT
Start: 2018-01-01 | End: 2018-01-01 | Stop reason: HOSPADM

## 2018-01-01 RX ORDER — FLUOXETINE HYDROCHLORIDE 20 MG/1
20 CAPSULE ORAL DAILY
COMMUNITY

## 2018-01-01 RX ORDER — LORAZEPAM 1 MG/1
0.5 TABLET ORAL EVERY 4 HOURS PRN
Status: DISCONTINUED | OUTPATIENT
Start: 2018-01-01 | End: 2018-01-01

## 2018-01-01 RX ORDER — FUROSEMIDE 40 MG/1
40 TABLET ORAL
Status: DISCONTINUED | OUTPATIENT
Start: 2018-01-01 | End: 2018-01-01 | Stop reason: HOSPADM

## 2018-01-01 RX ORDER — SODIUM CHLORIDE 9 MG/ML
1000 INJECTION, SOLUTION INTRAVENOUS CONTINUOUS
Status: DISCONTINUED | OUTPATIENT
Start: 2018-01-01 | End: 2018-01-01

## 2018-01-01 RX ORDER — OXYCODONE HYDROCHLORIDE 5 MG/1
5 TABLET ORAL
Status: DISCONTINUED | OUTPATIENT
Start: 2018-01-01 | End: 2018-01-01

## 2018-01-01 RX ORDER — LORAZEPAM 2 MG/ML
1 CONCENTRATE ORAL
Status: DISCONTINUED | OUTPATIENT
Start: 2018-01-01 | End: 2018-01-01 | Stop reason: HOSPADM

## 2018-01-01 RX ORDER — FLUDROCORTISONE ACETATE 0.1 MG/1
0.1 TABLET ORAL EVERY MORNING
Status: DISCONTINUED | OUTPATIENT
Start: 2018-01-01 | End: 2018-01-01 | Stop reason: HOSPADM

## 2018-01-01 RX ORDER — SPIRONOLACTONE 100 MG/1
100 TABLET, FILM COATED ORAL
Status: DISCONTINUED | OUTPATIENT
Start: 2018-01-01 | End: 2018-01-01 | Stop reason: HOSPADM

## 2018-01-01 RX ORDER — MORPHINE SULFATE 10 MG/ML
10 INJECTION, SOLUTION INTRAMUSCULAR; INTRAVENOUS
Status: DISCONTINUED | OUTPATIENT
Start: 2018-01-01 | End: 2018-01-01 | Stop reason: HOSPADM

## 2018-01-01 RX ORDER — FUROSEMIDE 40 MG/1
40 TABLET ORAL DAILY
Status: DISCONTINUED | OUTPATIENT
Start: 2018-01-01 | End: 2018-01-01

## 2018-01-01 RX ORDER — POTASSIUM CHLORIDE 20 MEQ/1
20 TABLET, EXTENDED RELEASE ORAL 2 TIMES DAILY
Status: DISCONTINUED | OUTPATIENT
Start: 2018-01-01 | End: 2018-01-01

## 2018-01-01 RX ORDER — PROPRANOLOL HYDROCHLORIDE 20 MG/1
20 TABLET ORAL ONCE
COMMUNITY
End: 2018-01-01

## 2018-01-01 RX ORDER — LORAZEPAM 2 MG/ML
2 INJECTION INTRAMUSCULAR
Status: DISCONTINUED | OUTPATIENT
Start: 2018-01-01 | End: 2018-01-01 | Stop reason: HOSPADM

## 2018-01-01 RX ORDER — LEVOTHYROXINE SODIUM 0.03 MG/1
25 TABLET ORAL
Status: DISCONTINUED | OUTPATIENT
Start: 2018-01-01 | End: 2018-01-01

## 2018-01-01 RX ORDER — POTASSIUM CHLORIDE 20 MEQ/1
40 TABLET, EXTENDED RELEASE ORAL ONCE
Status: DISCONTINUED | OUTPATIENT
Start: 2018-01-01 | End: 2018-01-01

## 2018-01-01 RX ORDER — LORAZEPAM 1 MG/1
1 TABLET ORAL EVERY 4 HOURS PRN
Status: DISCONTINUED | OUTPATIENT
Start: 2018-01-01 | End: 2018-01-01 | Stop reason: HOSPADM

## 2018-01-01 RX ORDER — THIAMINE MONONITRATE (VIT B1) 100 MG
100 TABLET ORAL DAILY
Status: COMPLETED | OUTPATIENT
Start: 2018-01-01 | End: 2018-01-01

## 2018-01-01 RX ORDER — ENALAPRILAT 1.25 MG/ML
1.25 INJECTION INTRAVENOUS EVERY 6 HOURS PRN
Status: DISCONTINUED | OUTPATIENT
Start: 2018-01-01 | End: 2018-01-01

## 2018-01-01 RX ORDER — MIDODRINE HYDROCHLORIDE 5 MG/1
5 TABLET ORAL ONCE
Status: COMPLETED | OUTPATIENT
Start: 2018-01-01 | End: 2018-01-01

## 2018-01-01 RX ORDER — FUROSEMIDE 20 MG/1
40 TABLET ORAL DAILY
Status: ON HOLD | COMMUNITY
End: 2018-01-01

## 2018-01-01 RX ORDER — LORAZEPAM 2 MG/ML
1 INJECTION INTRAMUSCULAR
Status: DISCONTINUED | OUTPATIENT
Start: 2018-01-01 | End: 2018-01-01 | Stop reason: HOSPADM

## 2018-01-01 RX ORDER — ERGOCALCIFEROL 1.25 MG/1
50000 CAPSULE ORAL
Status: DISCONTINUED | OUTPATIENT
Start: 2018-01-01 | End: 2018-01-01

## 2018-01-01 RX ORDER — PROMETHAZINE HYDROCHLORIDE 25 MG/1
12.5-25 SUPPOSITORY RECTAL EVERY 4 HOURS PRN
Status: DISCONTINUED | OUTPATIENT
Start: 2018-01-01 | End: 2018-01-01 | Stop reason: HOSPADM

## 2018-01-01 RX ORDER — PROPRANOLOL HYDROCHLORIDE 10 MG/1
10 TABLET ORAL EVERY 12 HOURS
Status: DISCONTINUED | OUTPATIENT
Start: 2018-01-01 | End: 2018-01-01 | Stop reason: HOSPADM

## 2018-01-01 RX ORDER — DIPHENHYDRAMINE HCL 25 MG
25 TABLET ORAL ONCE
Status: COMPLETED | OUTPATIENT
Start: 2018-01-01 | End: 2018-01-01

## 2018-01-01 RX ORDER — FLUOXETINE HYDROCHLORIDE 20 MG/1
20 CAPSULE ORAL DAILY
Status: DISCONTINUED | OUTPATIENT
Start: 2018-01-01 | End: 2018-01-01

## 2018-01-01 RX ORDER — FOLIC ACID 1 MG/1
1 TABLET ORAL DAILY
Status: DISCONTINUED | OUTPATIENT
Start: 2018-01-01 | End: 2018-01-01 | Stop reason: HOSPADM

## 2018-01-01 RX ORDER — ALBUMIN (HUMAN) 12.5 G/50ML
62.5 SOLUTION INTRAVENOUS ONCE
Status: DISCONTINUED | OUTPATIENT
Start: 2018-01-01 | End: 2018-01-01

## 2018-01-01 RX ORDER — SPIRONOLACTONE 100 MG/1
100 TABLET, FILM COATED ORAL DAILY
Qty: 30 TAB | Refills: 1 | Status: SHIPPED | OUTPATIENT
Start: 2018-01-01

## 2018-01-01 RX ORDER — LABETALOL 200 MG/1
200 TABLET, FILM COATED ORAL EVERY 6 HOURS PRN
Status: DISCONTINUED | OUTPATIENT
Start: 2018-01-01 | End: 2018-01-01

## 2018-01-01 RX ORDER — LEVOTHYROXINE SODIUM 0.03 MG/1
25 TABLET ORAL
COMMUNITY

## 2018-01-01 RX ORDER — MAGNESIUM SULFATE HEPTAHYDRATE 40 MG/ML
2 INJECTION, SOLUTION INTRAVENOUS ONCE
Status: DISCONTINUED | OUTPATIENT
Start: 2018-01-01 | End: 2018-01-01

## 2018-01-01 RX ORDER — POTASSIUM CHLORIDE 29.8 MG/ML
40 INJECTION INTRAVENOUS ONCE
Status: COMPLETED | OUTPATIENT
Start: 2018-01-01 | End: 2018-01-01

## 2018-01-01 RX ORDER — ETOMIDATE 2 MG/ML
20 INJECTION INTRAVENOUS ONCE
Status: COMPLETED | OUTPATIENT
Start: 2018-01-01 | End: 2018-01-01

## 2018-01-01 RX ORDER — DIAZEPAM 10 MG/1
10 TABLET ORAL ONCE
COMMUNITY
End: 2018-01-01

## 2018-01-01 RX ORDER — FOLIC ACID 1 MG/1
1 TABLET ORAL DAILY
Qty: 30 TAB | Refills: 1 | Status: SHIPPED | OUTPATIENT
Start: 2018-01-01

## 2018-01-01 RX ORDER — MAGNESIUM SULFATE HEPTAHYDRATE 40 MG/ML
2 INJECTION, SOLUTION INTRAVENOUS ONCE
Status: COMPLETED | OUTPATIENT
Start: 2018-01-01 | End: 2018-01-01

## 2018-01-01 RX ORDER — SODIUM CHLORIDE 9 MG/ML
1000 INJECTION, SOLUTION INTRAVENOUS ONCE
Status: COMPLETED | OUTPATIENT
Start: 2018-01-01 | End: 2018-01-01

## 2018-01-01 RX ORDER — LORAZEPAM 2 MG/ML
1.5 INJECTION INTRAMUSCULAR
Status: DISCONTINUED | OUTPATIENT
Start: 2018-01-01 | End: 2018-01-01 | Stop reason: HOSPADM

## 2018-01-01 RX ORDER — ALBUMIN (HUMAN) 12.5 G/50ML
25 SOLUTION INTRAVENOUS EVERY 6 HOURS
Status: COMPLETED | OUTPATIENT
Start: 2018-01-01 | End: 2018-01-01

## 2018-01-01 RX ORDER — TRAMADOL HYDROCHLORIDE 50 MG/1
50 TABLET ORAL EVERY 6 HOURS PRN
Status: DISCONTINUED | OUTPATIENT
Start: 2018-01-01 | End: 2018-01-01 | Stop reason: HOSPADM

## 2018-01-01 RX ORDER — POLYETHYLENE GLYCOL 3350 17 G/17G
1 POWDER, FOR SOLUTION ORAL
Status: DISCONTINUED | OUTPATIENT
Start: 2018-01-01 | End: 2018-01-01

## 2018-01-01 RX ORDER — FAMOTIDINE 20 MG/1
20 TABLET, FILM COATED ORAL DAILY
Status: DISCONTINUED | OUTPATIENT
Start: 2018-01-01 | End: 2018-01-01

## 2018-01-01 RX ORDER — LACTULOSE 20 G/30ML
30 SOLUTION ORAL 2 TIMES DAILY
Status: DISCONTINUED | OUTPATIENT
Start: 2018-01-01 | End: 2018-01-01

## 2018-01-01 RX ORDER — ONDANSETRON 2 MG/ML
4 INJECTION INTRAMUSCULAR; INTRAVENOUS ONCE
Status: COMPLETED | OUTPATIENT
Start: 2018-01-01 | End: 2018-01-01

## 2018-01-01 RX ORDER — METHYLPREDNISOLONE SODIUM SUCCINATE 40 MG/ML
40 INJECTION, POWDER, LYOPHILIZED, FOR SOLUTION INTRAMUSCULAR; INTRAVENOUS DAILY
Status: DISCONTINUED | OUTPATIENT
Start: 2018-01-01 | End: 2018-01-01

## 2018-01-01 RX ORDER — SODIUM CHLORIDE 9 MG/ML
INJECTION, SOLUTION INTRAVENOUS CONTINUOUS
Status: DISCONTINUED | OUTPATIENT
Start: 2018-01-01 | End: 2018-01-01

## 2018-01-01 RX ORDER — POTASSIUM CHLORIDE 7.45 MG/ML
10 INJECTION INTRAVENOUS
Status: COMPLETED | OUTPATIENT
Start: 2018-01-01 | End: 2018-01-01

## 2018-01-01 RX ORDER — ONDANSETRON 4 MG/1
4 TABLET, ORALLY DISINTEGRATING ORAL ONCE
COMMUNITY
End: 2018-01-01

## 2018-01-01 RX ORDER — LORAZEPAM 1 MG/1
1 TABLET ORAL ONCE
Status: COMPLETED | OUTPATIENT
Start: 2018-01-01 | End: 2018-01-01

## 2018-01-01 RX ORDER — FOLIC ACID 1 MG/1
1 TABLET ORAL DAILY
Status: COMPLETED | OUTPATIENT
Start: 2018-01-01 | End: 2018-01-01

## 2018-01-01 RX ORDER — LORAZEPAM 0.5 MG/1
0.5 TABLET ORAL EVERY 4 HOURS PRN
Status: DISCONTINUED | OUTPATIENT
Start: 2018-01-01 | End: 2018-01-01 | Stop reason: HOSPADM

## 2018-01-01 RX ORDER — HEPARIN SODIUM 5000 [USP'U]/ML
5000 INJECTION, SOLUTION INTRAVENOUS; SUBCUTANEOUS EVERY 8 HOURS
Status: DISCONTINUED | OUTPATIENT
Start: 2018-01-01 | End: 2018-01-01 | Stop reason: HOSPADM

## 2018-01-01 RX ORDER — BISACODYL 10 MG
10 SUPPOSITORY, RECTAL RECTAL
Status: DISCONTINUED | OUTPATIENT
Start: 2018-01-01 | End: 2018-01-01 | Stop reason: HOSPADM

## 2018-01-01 RX ORDER — LORAZEPAM 1 MG/1
4 TABLET ORAL
Status: DISCONTINUED | OUTPATIENT
Start: 2018-01-01 | End: 2018-01-01

## 2018-01-01 RX ORDER — ALBUMIN (HUMAN) 12.5 G/50ML
12.5 SOLUTION INTRAVENOUS 3 TIMES DAILY
Status: COMPLETED | OUTPATIENT
Start: 2018-01-01 | End: 2018-01-01

## 2018-01-01 RX ORDER — ALBUMIN (HUMAN) 12.5 G/50ML
50 SOLUTION INTRAVENOUS
Status: DISCONTINUED | OUTPATIENT
Start: 2018-01-01 | End: 2018-01-01

## 2018-01-01 RX ORDER — PROPRANOLOL HYDROCHLORIDE 10 MG/1
10 TABLET ORAL 2 TIMES DAILY
Status: DISCONTINUED | OUTPATIENT
Start: 2018-01-01 | End: 2018-01-01

## 2018-01-01 RX ORDER — LORAZEPAM 1 MG/1
3 TABLET ORAL
Status: DISCONTINUED | OUTPATIENT
Start: 2018-01-01 | End: 2018-01-01

## 2018-01-01 RX ORDER — GADOBUTROL 604.72 MG/ML
7.5 INJECTION INTRAVENOUS ONCE
Status: COMPLETED | OUTPATIENT
Start: 2018-01-01 | End: 2018-01-01

## 2018-01-01 RX ORDER — ACETAMINOPHEN 500 MG
500 TABLET ORAL EVERY 6 HOURS PRN
Status: DISCONTINUED | OUTPATIENT
Start: 2018-01-01 | End: 2018-01-01

## 2018-01-01 RX ORDER — LORAZEPAM 1 MG/1
2 TABLET ORAL
Status: DISCONTINUED | OUTPATIENT
Start: 2018-01-01 | End: 2018-01-01 | Stop reason: HOSPADM

## 2018-01-01 RX ORDER — FUROSEMIDE 10 MG/ML
40 INJECTION INTRAMUSCULAR; INTRAVENOUS ONCE
Status: COMPLETED | OUTPATIENT
Start: 2018-01-01 | End: 2018-01-01

## 2018-01-01 RX ORDER — TRAMADOL HYDROCHLORIDE 50 MG/1
50 TABLET ORAL EVERY 6 HOURS PRN
Qty: 30 TAB | Refills: 0 | Status: SHIPPED | OUTPATIENT
Start: 2018-01-01 | End: 2018-01-01

## 2018-01-01 RX ORDER — LORAZEPAM 1 MG/1
3 TABLET ORAL
Status: DISCONTINUED | OUTPATIENT
Start: 2018-01-01 | End: 2018-01-01 | Stop reason: HOSPADM

## 2018-01-01 RX ORDER — FUROSEMIDE 40 MG/1
40 TABLET ORAL DAILY
Qty: 30 TAB | Refills: 1 | Status: SHIPPED | OUTPATIENT
Start: 2018-01-01

## 2018-01-01 RX ADMIN — FLUOXETINE HYDROCHLORIDE 20 MG: 20 CAPSULE ORAL at 05:57

## 2018-01-01 RX ADMIN — MIDODRINE HYDROCHLORIDE 5 MG: 5 TABLET ORAL at 18:01

## 2018-01-01 RX ADMIN — OMEPRAZOLE 40 MG: 20 CAPSULE, DELAYED RELEASE ORAL at 04:04

## 2018-01-01 RX ADMIN — MAGNESIUM GLUCONATE 500 MG ORAL TABLET 400 MG: 500 TABLET ORAL at 08:00

## 2018-01-01 RX ADMIN — MAGNESIUM GLUCONATE 500 MG ORAL TABLET 400 MG: 500 TABLET ORAL at 07:58

## 2018-01-01 RX ADMIN — Medication 100 MG: at 07:44

## 2018-01-01 RX ADMIN — MIDODRINE HYDROCHLORIDE 10 MG: 5 TABLET ORAL at 12:02

## 2018-01-01 RX ADMIN — NOREPINEPHRINE BITARTRATE 30 MCG/MIN: 1 INJECTION INTRAVENOUS at 21:00

## 2018-01-01 RX ADMIN — LEVOTHYROXINE SODIUM 25 MCG: 25 TABLET ORAL at 04:04

## 2018-01-01 RX ADMIN — THERA TABS 1 TABLET: TAB at 07:41

## 2018-01-01 RX ADMIN — DIBASIC SODIUM PHOSPHATE, MONOBASIC POTASSIUM PHOSPHATE AND MONOBASIC SODIUM PHOSPHATE 1 TABLET: 852; 155; 130 TABLET ORAL at 05:38

## 2018-01-01 RX ADMIN — LACTULOSE 30 ML: 20 SOLUTION ORAL at 11:10

## 2018-01-01 RX ADMIN — LORAZEPAM 0.5 MG: 0.5 TABLET ORAL at 02:45

## 2018-01-01 RX ADMIN — SODIUM BICARBONATE 1300 MG: 650 TABLET ORAL at 09:23

## 2018-01-01 RX ADMIN — DIBASIC SODIUM PHOSPHATE, MONOBASIC POTASSIUM PHOSPHATE AND MONOBASIC SODIUM PHOSPHATE 1 TABLET: 852; 155; 130 TABLET ORAL at 06:28

## 2018-01-01 RX ADMIN — PROPRANOLOL HYDROCHLORIDE 10 MG: 10 TABLET ORAL at 17:42

## 2018-01-01 RX ADMIN — PHENYLEPHRINE HYDROCHLORIDE 20 MCG/MIN: 10 INJECTION INTRAVENOUS at 20:50

## 2018-01-01 RX ADMIN — FOLIC ACID 1 MG: 1 TABLET ORAL at 08:38

## 2018-01-01 RX ADMIN — MIDODRINE HYDROCHLORIDE 10 MG: 5 TABLET ORAL at 10:28

## 2018-01-01 RX ADMIN — MIDODRINE HYDROCHLORIDE 10 MG: 5 TABLET ORAL at 17:52

## 2018-01-01 RX ADMIN — SODIUM CHLORIDE: 9 INJECTION, SOLUTION INTRAVENOUS at 22:43

## 2018-01-01 RX ADMIN — OMEPRAZOLE 40 MG: 20 CAPSULE, DELAYED RELEASE ORAL at 05:48

## 2018-01-01 RX ADMIN — ALBUMIN (HUMAN) 50 G: 0.25 INJECTION, SOLUTION INTRAVENOUS at 13:54

## 2018-01-01 RX ADMIN — LORAZEPAM 0.5 MG: 0.5 TABLET ORAL at 00:19

## 2018-01-01 RX ADMIN — PROPRANOLOL HYDROCHLORIDE 10 MG: 10 TABLET ORAL at 06:25

## 2018-01-01 RX ADMIN — STANDARDIZED SENNA CONCENTRATE AND DOCUSATE SODIUM 2 TABLET: 8.6; 5 TABLET, FILM COATED ORAL at 08:52

## 2018-01-01 RX ADMIN — MIDODRINE HYDROCHLORIDE 10 MG: 5 TABLET ORAL at 05:51

## 2018-01-01 RX ADMIN — DIBASIC SODIUM PHOSPHATE, MONOBASIC POTASSIUM PHOSPHATE AND MONOBASIC SODIUM PHOSPHATE 1 TABLET: 852; 155; 130 TABLET ORAL at 17:52

## 2018-01-01 RX ADMIN — OXYCODONE HYDROCHLORIDE AND ACETAMINOPHEN 500 MG: 500 TABLET ORAL at 12:07

## 2018-01-01 RX ADMIN — DIBASIC SODIUM PHOSPHATE, MONOBASIC POTASSIUM PHOSPHATE AND MONOBASIC SODIUM PHOSPHATE 1 TABLET: 852; 155; 130 TABLET ORAL at 05:22

## 2018-01-01 RX ADMIN — POTASSIUM CHLORIDE 20 MEQ: 1500 TABLET, EXTENDED RELEASE ORAL at 09:40

## 2018-01-01 RX ADMIN — PREDNISONE 20 MG: 20 TABLET ORAL at 08:00

## 2018-01-01 RX ADMIN — DIBASIC SODIUM PHOSPHATE, MONOBASIC POTASSIUM PHOSPHATE AND MONOBASIC SODIUM PHOSPHATE 1 TABLET: 852; 155; 130 TABLET ORAL at 17:35

## 2018-01-01 RX ADMIN — PROPOFOL 40 MCG/KG/MIN: 10 INJECTION, EMULSION INTRAVENOUS at 21:30

## 2018-01-01 RX ADMIN — ENOXAPARIN SODIUM 40 MG: 100 INJECTION SUBCUTANEOUS at 13:27

## 2018-01-01 RX ADMIN — VASOPRESSIN 0.03 UNITS/MIN: 20 INJECTION INTRAVENOUS at 02:00

## 2018-01-01 RX ADMIN — HYDROCORTISONE SODIUM SUCCINATE 50 MG: 100 INJECTION, POWDER, FOR SOLUTION INTRAMUSCULAR; INTRAVENOUS at 05:14

## 2018-01-01 RX ADMIN — POTASSIUM CHLORIDE 10 MEQ: 10 INJECTION, SOLUTION INTRAVENOUS at 11:26

## 2018-01-01 RX ADMIN — MAGNESIUM GLUCONATE 500 MG ORAL TABLET 400 MG: 500 TABLET ORAL at 09:05

## 2018-01-01 RX ADMIN — DIBASIC SODIUM PHOSPHATE, MONOBASIC POTASSIUM PHOSPHATE AND MONOBASIC SODIUM PHOSPHATE 1 TABLET: 852; 155; 130 TABLET ORAL at 06:32

## 2018-01-01 RX ADMIN — FLUOXETINE HYDROCHLORIDE 20 MG: 20 CAPSULE ORAL at 05:44

## 2018-01-01 RX ADMIN — GABAPENTIN 300 MG: 300 CAPSULE ORAL at 05:44

## 2018-01-01 RX ADMIN — MIDODRINE HYDROCHLORIDE 10 MG: 5 TABLET ORAL at 08:18

## 2018-01-01 RX ADMIN — Medication 100 MG: at 06:25

## 2018-01-01 RX ADMIN — MIDODRINE HYDROCHLORIDE 10 MG: 5 TABLET ORAL at 16:55

## 2018-01-01 RX ADMIN — LACTULOSE 30 ML: 20 SOLUTION ORAL at 03:50

## 2018-01-01 RX ADMIN — THIAMINE HCL TAB 100 MG 100 MG: 100 TAB at 05:14

## 2018-01-01 RX ADMIN — SODIUM PHOSPHATE, MONOBASIC, MONOHYDRATE AND SODIUM PHOSPHATE, DIBASIC, ANHYDROUS 20 MMOL: 276; 142 INJECTION, SOLUTION INTRAVENOUS at 06:30

## 2018-01-01 RX ADMIN — POTASSIUM CHLORIDE 20 MEQ: 1500 TABLET, EXTENDED RELEASE ORAL at 08:38

## 2018-01-01 RX ADMIN — OMEPRAZOLE 40 MG: 20 CAPSULE, DELAYED RELEASE ORAL at 05:08

## 2018-01-01 RX ADMIN — SODIUM CHLORIDE 1000 ML: 9 INJECTION, SOLUTION INTRAVENOUS at 17:41

## 2018-01-01 RX ADMIN — MIDODRINE HYDROCHLORIDE 5 MG: 5 TABLET ORAL at 12:48

## 2018-01-01 RX ADMIN — FLUDROCORTISONE ACETATE 0.1 MG: 0.1 TABLET ORAL at 08:35

## 2018-01-01 RX ADMIN — ACETAMINOPHEN 500 MG: 500 TABLET, FILM COATED ORAL at 06:24

## 2018-01-01 RX ADMIN — LEVOTHYROXINE SODIUM 25 MCG: 25 TABLET ORAL at 05:14

## 2018-01-01 RX ADMIN — SODIUM CHLORIDE: 9 INJECTION, SOLUTION INTRAVENOUS at 03:54

## 2018-01-01 RX ADMIN — FOLIC ACID 1 MG: 1 TABLET ORAL at 05:57

## 2018-01-01 RX ADMIN — THIAMINE HCL TAB 100 MG 100 MG: 100 TAB at 10:28

## 2018-01-01 RX ADMIN — HYDROCORTISONE SODIUM SUCCINATE 50 MG: 100 INJECTION, POWDER, FOR SOLUTION INTRAMUSCULAR; INTRAVENOUS at 00:10

## 2018-01-01 RX ADMIN — CLONAZEPAM 1 MG: 1 TABLET ORAL at 09:06

## 2018-01-01 RX ADMIN — CLONAZEPAM 1 MG: 1 TABLET ORAL at 20:54

## 2018-01-01 RX ADMIN — FOLIC ACID 1 MG: 1 TABLET ORAL at 05:08

## 2018-01-01 RX ADMIN — THERA TABS 1 TABLET: TAB at 06:25

## 2018-01-01 RX ADMIN — HYDROCORTISONE SODIUM SUCCINATE 50 MG: 100 INJECTION, POWDER, FOR SOLUTION INTRAMUSCULAR; INTRAVENOUS at 11:10

## 2018-01-01 RX ADMIN — LORAZEPAM 1 MG: 1 TABLET ORAL at 00:08

## 2018-01-01 RX ADMIN — ALBUMIN (HUMAN) 12.5 G: 0.25 INJECTION, SOLUTION INTRAVENOUS at 17:14

## 2018-01-01 RX ADMIN — HYDROCORTISONE SODIUM SUCCINATE 50 MG: 100 INJECTION, POWDER, FOR SOLUTION INTRAMUSCULAR; INTRAVENOUS at 17:23

## 2018-01-01 RX ADMIN — DIBASIC SODIUM PHOSPHATE, MONOBASIC POTASSIUM PHOSPHATE AND MONOBASIC SODIUM PHOSPHATE 1 TABLET: 852; 155; 130 TABLET ORAL at 11:36

## 2018-01-01 RX ADMIN — MIDODRINE HYDROCHLORIDE 5 MG: 5 TABLET ORAL at 17:47

## 2018-01-01 RX ADMIN — PREDNISONE 20 MG: 20 TABLET ORAL at 13:57

## 2018-01-01 RX ADMIN — SODIUM CHLORIDE: 9 INJECTION, SOLUTION INTRAVENOUS at 17:14

## 2018-01-01 RX ADMIN — SPIRONOLACTONE 100 MG: 100 TABLET, FILM COATED ORAL at 05:08

## 2018-01-01 RX ADMIN — DIBASIC SODIUM PHOSPHATE, MONOBASIC POTASSIUM PHOSPHATE AND MONOBASIC SODIUM PHOSPHATE 1 TABLET: 852; 155; 130 TABLET ORAL at 23:11

## 2018-01-01 RX ADMIN — POTASSIUM CHLORIDE AND SODIUM CHLORIDE: 900; 150 INJECTION, SOLUTION INTRAVENOUS at 07:43

## 2018-01-01 RX ADMIN — POTASSIUM CHLORIDE AND SODIUM CHLORIDE: 900; 150 INJECTION, SOLUTION INTRAVENOUS at 22:18

## 2018-01-01 RX ADMIN — SODIUM CHLORIDE 8 MG/HR: 9 INJECTION, SOLUTION INTRAVENOUS at 15:06

## 2018-01-01 RX ADMIN — ENOXAPARIN SODIUM 40 MG: 100 INJECTION SUBCUTANEOUS at 08:35

## 2018-01-01 RX ADMIN — MIDODRINE HYDROCHLORIDE 10 MG: 5 TABLET ORAL at 17:00

## 2018-01-01 RX ADMIN — THERA TABS 1 TABLET: TAB at 07:44

## 2018-01-01 RX ADMIN — PREDNISONE 20 MG: 20 TABLET ORAL at 17:18

## 2018-01-01 RX ADMIN — ERGOCALCIFEROL 50000 UNITS: 1.25 CAPSULE, LIQUID FILLED ORAL at 08:40

## 2018-01-01 RX ADMIN — DIBASIC SODIUM PHOSPHATE, MONOBASIC POTASSIUM PHOSPHATE AND MONOBASIC SODIUM PHOSPHATE 1 TABLET: 852; 155; 130 TABLET ORAL at 18:29

## 2018-01-01 RX ADMIN — DIBASIC SODIUM PHOSPHATE, MONOBASIC POTASSIUM PHOSPHATE AND MONOBASIC SODIUM PHOSPHATE 1 TABLET: 852; 155; 130 TABLET ORAL at 12:02

## 2018-01-01 RX ADMIN — PREDNISONE 20 MG: 20 TABLET ORAL at 15:00

## 2018-01-01 RX ADMIN — MIDODRINE HYDROCHLORIDE 10 MG: 5 TABLET ORAL at 18:24

## 2018-01-01 RX ADMIN — PROPOFOL 20 MCG/KG/MIN: 10 INJECTION, EMULSION INTRAVENOUS at 08:07

## 2018-01-01 RX ADMIN — DIBASIC SODIUM PHOSPHATE, MONOBASIC POTASSIUM PHOSPHATE AND MONOBASIC SODIUM PHOSPHATE 1 TABLET: 852; 155; 130 TABLET ORAL at 05:12

## 2018-01-01 RX ADMIN — LORAZEPAM 0.5 MG: 0.5 TABLET ORAL at 23:59

## 2018-01-01 RX ADMIN — LEVOTHYROXINE SODIUM 25 MCG: 25 TABLET ORAL at 05:48

## 2018-01-01 RX ADMIN — CLONAZEPAM 1 MG: 1 TABLET ORAL at 20:56

## 2018-01-01 RX ADMIN — ENOXAPARIN SODIUM 40 MG: 100 INJECTION SUBCUTANEOUS at 08:18

## 2018-01-01 RX ADMIN — PROPRANOLOL HYDROCHLORIDE 10 MG: 10 TABLET ORAL at 05:08

## 2018-01-01 RX ADMIN — GABAPENTIN 300 MG: 300 CAPSULE ORAL at 17:23

## 2018-01-01 RX ADMIN — PREDNISONE 20 MG: 20 TABLET ORAL at 20:25

## 2018-01-01 RX ADMIN — MIDODRINE HYDROCHLORIDE 10 MG: 5 TABLET ORAL at 12:07

## 2018-01-01 RX ADMIN — MORPHINE SULFATE 10 MG: 10 INJECTION INTRAVENOUS at 16:57

## 2018-01-01 RX ADMIN — ENOXAPARIN SODIUM 40 MG: 100 INJECTION SUBCUTANEOUS at 07:41

## 2018-01-01 RX ADMIN — FLUOXETINE HYDROCHLORIDE 20 MG: 20 CAPSULE ORAL at 04:04

## 2018-01-01 RX ADMIN — ETOMIDATE 20 MG: 2 INJECTION INTRAVENOUS at 18:45

## 2018-01-01 RX ADMIN — CEFTRIAXONE SODIUM 1 G: 1 INJECTION, POWDER, FOR SOLUTION INTRAMUSCULAR; INTRAVENOUS at 05:53

## 2018-01-01 RX ADMIN — ENOXAPARIN SODIUM 40 MG: 100 INJECTION SUBCUTANEOUS at 10:28

## 2018-01-01 RX ADMIN — POTASSIUM CHLORIDE AND SODIUM CHLORIDE: 900; 150 INJECTION, SOLUTION INTRAVENOUS at 09:40

## 2018-01-01 RX ADMIN — METHYLPREDNISOLONE SODIUM SUCCINATE 40 MG: 40 INJECTION, POWDER, FOR SOLUTION INTRAMUSCULAR; INTRAVENOUS at 23:46

## 2018-01-01 RX ADMIN — DIBASIC SODIUM PHOSPHATE, MONOBASIC POTASSIUM PHOSPHATE AND MONOBASIC SODIUM PHOSPHATE 1 TABLET: 852; 155; 130 TABLET ORAL at 11:04

## 2018-01-01 RX ADMIN — METHYLPREDNISOLONE SODIUM SUCCINATE 40 MG: 40 INJECTION, POWDER, FOR SOLUTION INTRAMUSCULAR; INTRAVENOUS at 05:08

## 2018-01-01 RX ADMIN — ACETAMINOPHEN 500 MG: 500 TABLET, FILM COATED ORAL at 17:32

## 2018-01-01 RX ADMIN — MIDODRINE HYDROCHLORIDE 10 MG: 5 TABLET ORAL at 17:23

## 2018-01-01 RX ADMIN — ALBUMIN (HUMAN) 25 G: 0.25 INJECTION, SOLUTION INTRAVENOUS at 05:14

## 2018-01-01 RX ADMIN — ALBUMIN (HUMAN) 12.5 G: 0.25 INJECTION, SOLUTION INTRAVENOUS at 01:14

## 2018-01-01 RX ADMIN — LORAZEPAM 1 MG: 1 TABLET ORAL at 23:22

## 2018-01-01 RX ADMIN — MIDODRINE HYDROCHLORIDE 2.5 MG: 5 TABLET ORAL at 17:15

## 2018-01-01 RX ADMIN — ALBUMIN (HUMAN) 50 G: 0.25 INJECTION, SOLUTION INTRAVENOUS at 20:51

## 2018-01-01 RX ADMIN — OCTREOTIDE ACETATE 50 MCG/HR: 200 INJECTION, SOLUTION INTRAVENOUS; SUBCUTANEOUS at 14:46

## 2018-01-01 RX ADMIN — SUCCINYLCHOLINE CHLORIDE 100 MG: 20 INJECTION, SOLUTION INTRAMUSCULAR; INTRAVENOUS at 18:45

## 2018-01-01 RX ADMIN — DIBASIC SODIUM PHOSPHATE, MONOBASIC POTASSIUM PHOSPHATE AND MONOBASIC SODIUM PHOSPHATE 1 TABLET: 852; 155; 130 TABLET ORAL at 01:09

## 2018-01-01 RX ADMIN — MIDODRINE HYDROCHLORIDE 10 MG: 5 TABLET ORAL at 17:36

## 2018-01-01 RX ADMIN — ALBUMIN (HUMAN) 25 G: 0.25 INJECTION, SOLUTION INTRAVENOUS at 00:09

## 2018-01-01 RX ADMIN — HYDROCORTISONE SODIUM SUCCINATE 50 MG: 100 INJECTION, POWDER, FOR SOLUTION INTRAMUSCULAR; INTRAVENOUS at 18:41

## 2018-01-01 RX ADMIN — SODIUM BICARBONATE 1300 MG: 650 TABLET ORAL at 17:24

## 2018-01-01 RX ADMIN — HYDROCORTISONE SODIUM SUCCINATE 50 MG: 100 INJECTION, POWDER, FOR SOLUTION INTRAMUSCULAR; INTRAVENOUS at 12:00

## 2018-01-01 RX ADMIN — FLUOXETINE HYDROCHLORIDE 20 MG: 20 CAPSULE ORAL at 05:14

## 2018-01-01 RX ADMIN — POTASSIUM CHLORIDE 10 MEQ: 10 INJECTION, SOLUTION INTRAVENOUS at 09:13

## 2018-01-01 RX ADMIN — ALBUMIN (HUMAN) 12.5 G: 0.25 INJECTION, SOLUTION INTRAVENOUS at 14:43

## 2018-01-01 RX ADMIN — DIBASIC SODIUM PHOSPHATE, MONOBASIC POTASSIUM PHOSPHATE AND MONOBASIC SODIUM PHOSPHATE 1 TABLET: 852; 155; 130 TABLET ORAL at 14:19

## 2018-01-01 RX ADMIN — CLONAZEPAM 1 MG: 1 TABLET ORAL at 07:59

## 2018-01-01 RX ADMIN — THIAMINE HCL TAB 100 MG 100 MG: 100 TAB at 07:40

## 2018-01-01 RX ADMIN — MIDODRINE HYDROCHLORIDE 10 MG: 5 TABLET ORAL at 09:05

## 2018-01-01 RX ADMIN — LACTULOSE 30 ML: 20 SOLUTION ORAL at 12:00

## 2018-01-01 RX ADMIN — MIDODRINE HYDROCHLORIDE 5 MG: 5 TABLET ORAL at 13:09

## 2018-01-01 RX ADMIN — MIDODRINE HYDROCHLORIDE 10 MG: 5 TABLET ORAL at 08:34

## 2018-01-01 RX ADMIN — SPIRONOLACTONE 100 MG: 100 TABLET, FILM COATED ORAL at 06:26

## 2018-01-01 RX ADMIN — THERA TABS 1 TABLET: TAB at 05:08

## 2018-01-01 RX ADMIN — OXYCODONE HYDROCHLORIDE 10 MG: 10 TABLET ORAL at 13:53

## 2018-01-01 RX ADMIN — MAGNESIUM SULFATE 1 G: 1 INJECTION INTRAVENOUS at 00:45

## 2018-01-01 RX ADMIN — LEVOTHYROXINE SODIUM 25 MCG: 25 TABLET ORAL at 05:54

## 2018-01-01 RX ADMIN — FOLIC ACID 1 MG: 1 TABLET ORAL at 05:14

## 2018-01-01 RX ADMIN — POTASSIUM CHLORIDE 20 MEQ: 1500 TABLET, EXTENDED RELEASE ORAL at 07:40

## 2018-01-01 RX ADMIN — SODIUM BICARBONATE 1300 MG: 650 TABLET ORAL at 12:07

## 2018-01-01 RX ADMIN — MAGNESIUM GLUCONATE 500 MG ORAL TABLET 400 MG: 500 TABLET ORAL at 20:25

## 2018-01-01 RX ADMIN — PROPRANOLOL HYDROCHLORIDE 10 MG: 10 TABLET ORAL at 17:32

## 2018-01-01 RX ADMIN — LACTULOSE 30 ML: 20 SOLUTION ORAL at 05:44

## 2018-01-01 RX ADMIN — MAGNESIUM SULFATE IN WATER 4 G: 40 INJECTION, SOLUTION INTRAVENOUS at 17:36

## 2018-01-01 RX ADMIN — SPIRONOLACTONE 100 MG: 100 TABLET, FILM COATED ORAL at 17:42

## 2018-01-01 RX ADMIN — THIAMINE HCL TAB 100 MG 100 MG: 100 TAB at 08:38

## 2018-01-01 RX ADMIN — PREDNISONE 20 MG: 20 TABLET ORAL at 20:53

## 2018-01-01 RX ADMIN — HYDROCORTISONE SODIUM SUCCINATE 50 MG: 100 INJECTION, POWDER, FOR SOLUTION INTRAMUSCULAR; INTRAVENOUS at 05:44

## 2018-01-01 RX ADMIN — MIDODRINE HYDROCHLORIDE 10 MG: 5 TABLET ORAL at 07:41

## 2018-01-01 RX ADMIN — MIDODRINE HYDROCHLORIDE 10 MG: 5 TABLET ORAL at 17:18

## 2018-01-01 RX ADMIN — GABAPENTIN 300 MG: 300 CAPSULE ORAL at 05:14

## 2018-01-01 RX ADMIN — INSULIN HUMAN 2 UNITS: 100 INJECTION, SOLUTION PARENTERAL at 18:43

## 2018-01-01 RX ADMIN — THIAMINE HCL TAB 100 MG 100 MG: 100 TAB at 08:52

## 2018-01-01 RX ADMIN — ALBUMIN (HUMAN) 25 G: 0.25 INJECTION, SOLUTION INTRAVENOUS at 09:48

## 2018-01-01 RX ADMIN — SODIUM CHLORIDE 500 ML: 9 INJECTION, SOLUTION INTRAVENOUS at 14:15

## 2018-01-01 RX ADMIN — OCTREOTIDE ACETATE 50 MCG/HR: 200 INJECTION, SOLUTION INTRAVENOUS; SUBCUTANEOUS at 12:22

## 2018-01-01 RX ADMIN — LORAZEPAM 2 MG: 2 INJECTION INTRAMUSCULAR; INTRAVENOUS at 16:56

## 2018-01-01 RX ADMIN — ALBUMIN (HUMAN) 25 G: 12.5 SOLUTION INTRAVENOUS at 02:18

## 2018-01-01 RX ADMIN — DIBASIC SODIUM PHOSPHATE, MONOBASIC POTASSIUM PHOSPHATE AND MONOBASIC SODIUM PHOSPHATE 1 TABLET: 852; 155; 130 TABLET ORAL at 23:22

## 2018-01-01 RX ADMIN — THERA TABS 1 TABLET: TAB at 05:14

## 2018-01-01 RX ADMIN — THERA TABS 1 TABLET: TAB at 08:38

## 2018-01-01 RX ADMIN — VASOPRESSIN 0.03 UNITS/MIN: 20 INJECTION INTRAVENOUS at 01:11

## 2018-01-01 RX ADMIN — POTASSIUM CHLORIDE 20 MEQ: 1500 TABLET, EXTENDED RELEASE ORAL at 19:42

## 2018-01-01 RX ADMIN — SPIRONOLACTONE 100 MG: 100 TABLET, FILM COATED ORAL at 04:05

## 2018-01-01 RX ADMIN — MIDODRINE HYDROCHLORIDE 10 MG: 5 TABLET ORAL at 11:58

## 2018-01-01 RX ADMIN — LACTULOSE 30 ML: 20 SOLUTION ORAL at 17:23

## 2018-01-01 RX ADMIN — ONDANSETRON HYDROCHLORIDE 4 MG: 2 INJECTION, SOLUTION INTRAMUSCULAR; INTRAVENOUS at 10:49

## 2018-01-01 RX ADMIN — DIBASIC SODIUM PHOSPHATE, MONOBASIC POTASSIUM PHOSPHATE AND MONOBASIC SODIUM PHOSPHATE 1 TABLET: 852; 155; 130 TABLET ORAL at 00:08

## 2018-01-01 RX ADMIN — FLUDROCORTISONE ACETATE 0.1 MG: 0.1 TABLET ORAL at 09:08

## 2018-01-01 RX ADMIN — CLONAZEPAM 1 MG: 1 TABLET ORAL at 20:25

## 2018-01-01 RX ADMIN — DIBASIC SODIUM PHOSPHATE, MONOBASIC POTASSIUM PHOSPHATE AND MONOBASIC SODIUM PHOSPHATE 1 TABLET: 852; 155; 130 TABLET ORAL at 11:58

## 2018-01-01 RX ADMIN — POTASSIUM CHLORIDE AND SODIUM CHLORIDE: 900; 150 INJECTION, SOLUTION INTRAVENOUS at 08:36

## 2018-01-01 RX ADMIN — PROPOFOL 10 MCG/KG/MIN: 10 INJECTION, EMULSION INTRAVENOUS at 17:15

## 2018-01-01 RX ADMIN — LORAZEPAM 0.5 MG: 0.5 TABLET ORAL at 01:09

## 2018-01-01 RX ADMIN — FOLIC ACID 1 MG: 1 TABLET ORAL at 08:52

## 2018-01-01 RX ADMIN — OXYCODONE HYDROCHLORIDE AND ACETAMINOPHEN 500 MG: 500 TABLET ORAL at 06:00

## 2018-01-01 RX ADMIN — LORAZEPAM 0.5 MG: 0.5 TABLET ORAL at 21:30

## 2018-01-01 RX ADMIN — THERA TABS 1 TABLET: TAB at 08:52

## 2018-01-01 RX ADMIN — MIDODRINE HYDROCHLORIDE 5 MG: 5 TABLET ORAL at 07:44

## 2018-01-01 RX ADMIN — POTASSIUM CHLORIDE 10 MEQ: 10 INJECTION, SOLUTION INTRAVENOUS at 10:54

## 2018-01-01 RX ADMIN — FLUDROCORTISONE ACETATE 0.1 MG: 0.1 TABLET ORAL at 12:19

## 2018-01-01 RX ADMIN — INSULIN HUMAN 3 UNITS: 100 INJECTION, SOLUTION PARENTERAL at 00:17

## 2018-01-01 RX ADMIN — FLUOXETINE HYDROCHLORIDE 20 MG: 20 CAPSULE ORAL at 04:25

## 2018-01-01 RX ADMIN — POTASSIUM CHLORIDE 40 MEQ: 400 INJECTION, SOLUTION INTRAVENOUS at 00:45

## 2018-01-01 RX ADMIN — POTASSIUM CHLORIDE: 2 INJECTION, SOLUTION, CONCENTRATE INTRAVENOUS at 14:16

## 2018-01-01 RX ADMIN — HYDROCORTISONE SODIUM SUCCINATE 50 MG: 100 INJECTION, POWDER, FOR SOLUTION INTRAMUSCULAR; INTRAVENOUS at 12:07

## 2018-01-01 RX ADMIN — CLONAZEPAM 1 MG: 1 TABLET ORAL at 14:47

## 2018-01-01 RX ADMIN — FOLIC ACID 1 MG: 1 TABLET ORAL at 07:45

## 2018-01-01 RX ADMIN — DIBASIC SODIUM PHOSPHATE, MONOBASIC POTASSIUM PHOSPHATE AND MONOBASIC SODIUM PHOSPHATE 1 TABLET: 852; 155; 130 TABLET ORAL at 12:19

## 2018-01-01 RX ADMIN — MIDODRINE HYDROCHLORIDE 5 MG: 5 TABLET ORAL at 12:07

## 2018-01-01 RX ADMIN — OXYCODONE HYDROCHLORIDE AND ACETAMINOPHEN 500 MG: 500 TABLET ORAL at 06:26

## 2018-01-01 RX ADMIN — POTASSIUM CHLORIDE AND SODIUM CHLORIDE: 900; 150 INJECTION, SOLUTION INTRAVENOUS at 01:51

## 2018-01-01 RX ADMIN — SODIUM CHLORIDE 1000 ML: 9 INJECTION, SOLUTION INTRAVENOUS at 12:04

## 2018-01-01 RX ADMIN — LEVOTHYROXINE SODIUM 25 MCG: 25 TABLET ORAL at 04:25

## 2018-01-01 RX ADMIN — NOREPINEPHRINE BITARTRATE 15 MCG/MIN: 1 INJECTION INTRAVENOUS at 22:23

## 2018-01-01 RX ADMIN — THERA TABS 1 TABLET: TAB at 10:28

## 2018-01-01 RX ADMIN — FUROSEMIDE 40 MG: 10 INJECTION, SOLUTION INTRAMUSCULAR; INTRAVENOUS at 09:32

## 2018-01-01 RX ADMIN — CLONAZEPAM 1 MG: 1 TABLET ORAL at 20:12

## 2018-01-01 RX ADMIN — FLUDROCORTISONE ACETATE 0.2 MG: 0.1 TABLET ORAL at 05:58

## 2018-01-01 RX ADMIN — THIAMINE HYDROCHLORIDE 1000 ML: 100 INJECTION, SOLUTION INTRAMUSCULAR; INTRAVENOUS at 11:36

## 2018-01-01 RX ADMIN — DIBASIC SODIUM PHOSPHATE, MONOBASIC POTASSIUM PHOSPHATE AND MONOBASIC SODIUM PHOSPHATE 1 TABLET: 852; 155; 130 TABLET ORAL at 17:18

## 2018-01-01 RX ADMIN — LACTULOSE 30 ML: 20 SOLUTION ORAL at 17:13

## 2018-01-01 RX ADMIN — LORAZEPAM 2 MG: 2 INJECTION INTRAMUSCULAR; INTRAVENOUS at 13:09

## 2018-01-01 RX ADMIN — LORAZEPAM 0.5 MG: 0.5 TABLET ORAL at 20:56

## 2018-01-01 RX ADMIN — VASOPRESSIN 0.03 UNITS/MIN: 20 INJECTION INTRAVENOUS at 12:28

## 2018-01-01 RX ADMIN — MIDODRINE HYDROCHLORIDE 10 MG: 5 TABLET ORAL at 11:10

## 2018-01-01 RX ADMIN — TRAMADOL HYDROCHLORIDE 50 MG: 50 TABLET, COATED ORAL at 15:31

## 2018-01-01 RX ADMIN — SODIUM ACETATE: 3.28 INJECTION, SOLUTION, CONCENTRATE INTRAVENOUS at 08:24

## 2018-01-01 RX ADMIN — LACTULOSE 30 ML: 20 SOLUTION ORAL at 12:07

## 2018-01-01 RX ADMIN — DIBASIC SODIUM PHOSPHATE, MONOBASIC POTASSIUM PHOSPHATE AND MONOBASIC SODIUM PHOSPHATE 1 TABLET: 852; 155; 130 TABLET ORAL at 18:24

## 2018-01-01 RX ADMIN — FAMOTIDINE 20 MG: 10 INJECTION, SOLUTION INTRAVENOUS at 05:14

## 2018-01-01 RX ADMIN — LORAZEPAM 1 MG: 1 TABLET ORAL at 20:40

## 2018-01-01 RX ADMIN — MORPHINE SULFATE 10 MG: 10 INJECTION, SOLUTION INTRAMUSCULAR; INTRAVENOUS at 16:57

## 2018-01-01 RX ADMIN — PREDNISONE 20 MG: 20 TABLET ORAL at 07:58

## 2018-01-01 RX ADMIN — FUROSEMIDE 40 MG: 40 TABLET ORAL at 15:24

## 2018-01-01 RX ADMIN — FLUDROCORTISONE ACETATE 0.1 MG: 0.1 TABLET ORAL at 08:00

## 2018-01-01 RX ADMIN — NOREPINEPHRINE BITARTRATE 30 MCG/MIN: 1 INJECTION INTRAVENOUS at 01:00

## 2018-01-01 RX ADMIN — POTASSIUM CHLORIDE AND SODIUM CHLORIDE: 900; 150 INJECTION, SOLUTION INTRAVENOUS at 04:54

## 2018-01-01 RX ADMIN — POTASSIUM CHLORIDE AND SODIUM CHLORIDE: 900; 150 INJECTION, SOLUTION INTRAVENOUS at 23:25

## 2018-01-01 RX ADMIN — MINERAL OIL AND WHITE PETROLATUM 1 APPLICATION: 150; 830 OINTMENT OPHTHALMIC at 12:29

## 2018-01-01 RX ADMIN — LACTULOSE 30 ML: 20 SOLUTION ORAL at 05:14

## 2018-01-01 RX ADMIN — NOREPINEPHRINE BITARTRATE 30 MCG/MIN: 1 INJECTION INTRAVENOUS at 15:10

## 2018-01-01 RX ADMIN — DIBASIC SODIUM PHOSPHATE, MONOBASIC POTASSIUM PHOSPHATE AND MONOBASIC SODIUM PHOSPHATE 1 TABLET: 852; 155; 130 TABLET ORAL at 05:27

## 2018-01-01 RX ADMIN — LEVOTHYROXINE SODIUM 25 MCG: 25 TABLET ORAL at 05:08

## 2018-01-01 RX ADMIN — ALBUMIN (HUMAN) 25 G: 12.5 SOLUTION INTRAVENOUS at 15:01

## 2018-01-01 RX ADMIN — THERA TABS 1 TABLET: TAB at 05:44

## 2018-01-01 RX ADMIN — THIAMINE HCL TAB 100 MG 100 MG: 100 TAB at 05:44

## 2018-01-01 RX ADMIN — CLONAZEPAM 1 MG: 1 TABLET ORAL at 08:35

## 2018-01-01 RX ADMIN — CEFTRIAXONE SODIUM 1 G: 1 INJECTION, POWDER, FOR SOLUTION INTRAMUSCULAR; INTRAVENOUS at 05:44

## 2018-01-01 RX ADMIN — MIDODRINE HYDROCHLORIDE 10 MG: 5 TABLET ORAL at 08:31

## 2018-01-01 RX ADMIN — THIAMINE HCL TAB 100 MG 100 MG: 100 TAB at 05:57

## 2018-01-01 RX ADMIN — DIBASIC SODIUM PHOSPHATE, MONOBASIC POTASSIUM PHOSPHATE AND MONOBASIC SODIUM PHOSPHATE 1 TABLET: 852; 155; 130 TABLET ORAL at 23:30

## 2018-01-01 RX ADMIN — IOHEXOL 100 ML: 350 INJECTION, SOLUTION INTRAVENOUS at 10:06

## 2018-01-01 RX ADMIN — MAGNESIUM SULFATE HEPTAHYDRATE 2 G: 40 INJECTION, SOLUTION INTRAVENOUS at 08:51

## 2018-01-01 RX ADMIN — FLUDROCORTISONE ACETATE 0.1 MG: 0.1 TABLET ORAL at 08:18

## 2018-01-01 RX ADMIN — DIBASIC SODIUM PHOSPHATE, MONOBASIC POTASSIUM PHOSPHATE AND MONOBASIC SODIUM PHOSPHATE 1 TABLET: 852; 155; 130 TABLET ORAL at 00:15

## 2018-01-01 RX ADMIN — MAGNESIUM SULFATE IN WATER 4 G: 40 INJECTION, SOLUTION INTRAVENOUS at 06:30

## 2018-01-01 RX ADMIN — FOLIC ACID 1 MG: 1 TABLET ORAL at 10:28

## 2018-01-01 RX ADMIN — MIDODRINE HYDROCHLORIDE 10 MG: 5 TABLET ORAL at 18:29

## 2018-01-01 RX ADMIN — CEFTRIAXONE SODIUM 1 G: 1 INJECTION, POWDER, FOR SOLUTION INTRAMUSCULAR; INTRAVENOUS at 16:08

## 2018-01-01 RX ADMIN — POTASSIUM CHLORIDE 40 MEQ: 1500 TABLET, EXTENDED RELEASE ORAL at 17:01

## 2018-01-01 RX ADMIN — MIDODRINE HYDROCHLORIDE 10 MG: 5 TABLET ORAL at 11:04

## 2018-01-01 RX ADMIN — OCTREOTIDE ACETATE 50 MCG/HR: 200 INJECTION, SOLUTION INTRAVENOUS; SUBCUTANEOUS at 14:52

## 2018-01-01 RX ADMIN — OMEPRAZOLE 40 MG: 20 CAPSULE, DELAYED RELEASE ORAL at 04:25

## 2018-01-01 RX ADMIN — MIDODRINE HYDROCHLORIDE 10 MG: 5 TABLET ORAL at 11:36

## 2018-01-01 RX ADMIN — PROPRANOLOL HYDROCHLORIDE 10 MG: 10 TABLET ORAL at 01:42

## 2018-01-01 RX ADMIN — FLUDROCORTISONE ACETATE 0.2 MG: 0.1 TABLET ORAL at 05:14

## 2018-01-01 RX ADMIN — HYDROCORTISONE SODIUM SUCCINATE 50 MG: 100 INJECTION, POWDER, FOR SOLUTION INTRAMUSCULAR; INTRAVENOUS at 08:16

## 2018-01-01 RX ADMIN — FOLIC ACID 1 MG: 1 TABLET ORAL at 05:48

## 2018-01-01 RX ADMIN — FUROSEMIDE 40 MG: 40 TABLET ORAL at 13:52

## 2018-01-01 RX ADMIN — PREDNISONE 20 MG: 20 TABLET ORAL at 09:05

## 2018-01-01 RX ADMIN — FENTANYL CITRATE 100 MCG: 50 INJECTION, SOLUTION INTRAMUSCULAR; INTRAVENOUS at 20:36

## 2018-01-01 RX ADMIN — ALBUMIN (HUMAN) 12.5 G: 0.25 INJECTION, SOLUTION INTRAVENOUS at 05:53

## 2018-01-01 RX ADMIN — IOHEXOL 100 ML: 350 INJECTION, SOLUTION INTRAVENOUS at 00:02

## 2018-01-01 RX ADMIN — SODIUM CHLORIDE 80 MG: 9 INJECTION, SOLUTION INTRAVENOUS at 08:32

## 2018-01-01 RX ADMIN — SODIUM CHLORIDE 500 ML: 9 INJECTION, SOLUTION INTRAVENOUS at 13:15

## 2018-01-01 RX ADMIN — POTASSIUM CHLORIDE AND SODIUM CHLORIDE: 900; 150 INJECTION, SOLUTION INTRAVENOUS at 12:21

## 2018-01-01 RX ADMIN — FOLIC ACID 1 MG: 1 TABLET ORAL at 05:44

## 2018-01-01 RX ADMIN — MIDODRINE HYDROCHLORIDE 10 MG: 5 TABLET ORAL at 14:19

## 2018-01-01 RX ADMIN — Medication 100 MG: at 05:08

## 2018-01-01 RX ADMIN — PROPRANOLOL HYDROCHLORIDE 10 MG: 10 TABLET ORAL at 04:05

## 2018-01-01 RX ADMIN — DIBASIC SODIUM PHOSPHATE, MONOBASIC POTASSIUM PHOSPHATE AND MONOBASIC SODIUM PHOSPHATE 1 TABLET: 852; 155; 130 TABLET ORAL at 17:01

## 2018-01-01 RX ADMIN — DIBASIC SODIUM PHOSPHATE, MONOBASIC POTASSIUM PHOSPHATE AND MONOBASIC SODIUM PHOSPHATE 1 TABLET: 852; 155; 130 TABLET ORAL at 16:55

## 2018-01-01 RX ADMIN — SODIUM CHLORIDE: 9 INJECTION, SOLUTION INTRAVENOUS at 03:04

## 2018-01-01 RX ADMIN — LORAZEPAM 1 MG: 1 TABLET ORAL at 09:48

## 2018-01-01 RX ADMIN — HYDROCORTISONE SODIUM SUCCINATE 50 MG: 100 INJECTION, POWDER, FOR SOLUTION INTRAMUSCULAR; INTRAVENOUS at 23:30

## 2018-01-01 RX ADMIN — VASOPRESSIN 0.03 UNITS/MIN: 20 INJECTION INTRAVENOUS at 20:45

## 2018-01-01 RX ADMIN — POTASSIUM CHLORIDE 10 MEQ: 10 INJECTION, SOLUTION INTRAVENOUS at 10:04

## 2018-01-01 RX ADMIN — GABAPENTIN 300 MG: 300 CAPSULE ORAL at 11:11

## 2018-01-01 RX ADMIN — METHYLPREDNISOLONE SODIUM SUCCINATE 40 MG: 40 INJECTION, POWDER, FOR SOLUTION INTRAMUSCULAR; INTRAVENOUS at 06:27

## 2018-01-01 RX ADMIN — SODIUM CHLORIDE 8 MG/HR: 9 INJECTION, SOLUTION INTRAVENOUS at 05:44

## 2018-01-01 RX ADMIN — PROPOFOL 70 MCG/KG/MIN: 10 INJECTION, EMULSION INTRAVENOUS at 00:54

## 2018-01-01 RX ADMIN — POTASSIUM CHLORIDE AND SODIUM CHLORIDE: 900; 150 INJECTION, SOLUTION INTRAVENOUS at 18:47

## 2018-01-01 RX ADMIN — TRAMADOL HYDROCHLORIDE 50 MG: 50 TABLET, COATED ORAL at 08:25

## 2018-01-01 RX ADMIN — MAGNESIUM GLUCONATE 500 MG ORAL TABLET 400 MG: 500 TABLET ORAL at 20:54

## 2018-01-01 RX ADMIN — NOREPINEPHRINE BITARTRATE 30 MCG/MIN: 1 INJECTION INTRAVENOUS at 05:14

## 2018-01-01 RX ADMIN — MAGNESIUM SULFATE IN WATER 2 G: 40 INJECTION, SOLUTION INTRAVENOUS at 08:25

## 2018-01-01 RX ADMIN — LORAZEPAM 0.5 MG: 0.5 TABLET ORAL at 20:54

## 2018-01-01 RX ADMIN — OXYCODONE HYDROCHLORIDE 10 MG: 10 TABLET ORAL at 23:58

## 2018-01-01 RX ADMIN — INSULIN HUMAN 3 UNITS: 100 INJECTION, SOLUTION PARENTERAL at 05:15

## 2018-01-01 RX ADMIN — SODIUM CHLORIDE 8 MG/HR: 9 INJECTION, SOLUTION INTRAVENOUS at 18:38

## 2018-01-01 RX ADMIN — SODIUM CHLORIDE 1000 ML: 9 INJECTION, SOLUTION INTRAVENOUS at 01:42

## 2018-01-01 RX ADMIN — MIDODRINE HYDROCHLORIDE 10 MG: 5 TABLET ORAL at 08:00

## 2018-01-01 RX ADMIN — SODIUM BICARBONATE 1300 MG: 650 TABLET ORAL at 05:49

## 2018-01-01 RX ADMIN — OMEPRAZOLE 40 MG: 20 CAPSULE, DELAYED RELEASE ORAL at 06:25

## 2018-01-01 RX ADMIN — PREDNISONE 50 MG: 50 TABLET ORAL at 15:25

## 2018-01-01 RX ADMIN — FAMOTIDINE 20 MG: 10 INJECTION, SOLUTION INTRAVENOUS at 05:56

## 2018-01-01 RX ADMIN — MIDODRINE HYDROCHLORIDE 5 MG: 5 TABLET ORAL at 08:38

## 2018-01-01 RX ADMIN — PREDNISONE 20 MG: 20 TABLET ORAL at 20:56

## 2018-01-01 RX ADMIN — DIPHENHYDRAMINE HCL 25 MG: 25 TABLET ORAL at 20:53

## 2018-01-01 RX ADMIN — DIBASIC SODIUM PHOSPHATE, MONOBASIC POTASSIUM PHOSPHATE AND MONOBASIC SODIUM PHOSPHATE 1 TABLET: 852; 155; 130 TABLET ORAL at 15:06

## 2018-01-01 RX ADMIN — MIDODRINE HYDROCHLORIDE 5 MG: 5 TABLET ORAL at 13:28

## 2018-01-01 RX ADMIN — SODIUM CHLORIDE 500 ML: 9 INJECTION, SOLUTION INTRAVENOUS at 02:25

## 2018-01-01 RX ADMIN — SODIUM CHLORIDE: 9 INJECTION, SOLUTION INTRAVENOUS at 15:43

## 2018-01-01 RX ADMIN — SODIUM ACETATE: 3.28 INJECTION, SOLUTION, CONCENTRATE INTRAVENOUS at 20:52

## 2018-01-01 RX ADMIN — ENOXAPARIN SODIUM 40 MG: 100 INJECTION SUBCUTANEOUS at 08:53

## 2018-01-01 RX ADMIN — MIDODRINE HYDROCHLORIDE 10 MG: 5 TABLET ORAL at 07:58

## 2018-01-01 RX ADMIN — OXYCODONE HYDROCHLORIDE 10 MG: 10 TABLET ORAL at 07:36

## 2018-01-01 RX ADMIN — SODIUM CHLORIDE 500 ML: 9 INJECTION, SOLUTION INTRAVENOUS at 17:23

## 2018-01-01 RX ADMIN — DIPHENHYDRAMINE HCL 25 MG: 25 TABLET ORAL at 01:22

## 2018-01-01 RX ADMIN — MAGNESIUM GLUCONATE 500 MG ORAL TABLET 400 MG: 500 TABLET ORAL at 20:56

## 2018-01-01 RX ADMIN — SODIUM CHLORIDE 8 MG/HR: 9 INJECTION, SOLUTION INTRAVENOUS at 08:39

## 2018-01-01 RX ADMIN — POTASSIUM CHLORIDE AND SODIUM CHLORIDE: 900; 150 INJECTION, SOLUTION INTRAVENOUS at 15:06

## 2018-01-01 RX ADMIN — FOLIC ACID 1 MG: 1 TABLET ORAL at 07:44

## 2018-01-01 RX ADMIN — ALBUMIN (HUMAN) 25 G: 0.25 INJECTION, SOLUTION INTRAVENOUS at 18:43

## 2018-01-01 RX ADMIN — ALBUMIN (HUMAN) 12.5 G: 0.25 INJECTION, SOLUTION INTRAVENOUS at 23:11

## 2018-01-01 RX ADMIN — DIBASIC SODIUM PHOSPHATE, MONOBASIC POTASSIUM PHOSPHATE AND MONOBASIC SODIUM PHOSPHATE 1 TABLET: 852; 155; 130 TABLET ORAL at 23:59

## 2018-01-01 RX ADMIN — OXYCODONE HYDROCHLORIDE 10 MG: 10 TABLET ORAL at 04:04

## 2018-01-01 RX ADMIN — IOHEXOL 50 ML: 240 INJECTION, SOLUTION INTRATHECAL; INTRAVASCULAR; INTRAVENOUS; ORAL at 10:06

## 2018-01-01 RX ADMIN — FLUDROCORTISONE ACETATE 0.2 MG: 0.1 TABLET ORAL at 05:44

## 2018-01-01 RX ADMIN — SODIUM BICARBONATE 1300 MG: 650 TABLET ORAL at 11:14

## 2018-01-01 RX ADMIN — CLONAZEPAM 1 MG: 1 TABLET ORAL at 08:18

## 2018-01-01 RX ADMIN — ALBUMIN (HUMAN) 25 G: 12.5 SOLUTION INTRAVENOUS at 20:15

## 2018-01-01 RX ADMIN — POTASSIUM CHLORIDE 20 MEQ: 1500 TABLET, EXTENDED RELEASE ORAL at 21:30

## 2018-01-01 RX ADMIN — LEVOTHYROXINE SODIUM 25 MCG: 25 TABLET ORAL at 06:25

## 2018-01-01 RX ADMIN — LORAZEPAM 1 MG: 1 TABLET ORAL at 06:32

## 2018-01-01 RX ADMIN — PREDNISONE 40 MG: 20 TABLET ORAL at 12:10

## 2018-01-01 RX ADMIN — MIDODRINE HYDROCHLORIDE 5 MG: 5 TABLET ORAL at 08:52

## 2018-01-01 RX ADMIN — MIDODRINE HYDROCHLORIDE 10 MG: 5 TABLET ORAL at 12:19

## 2018-01-01 RX ADMIN — FOLIC ACID 1 MG: 1 TABLET ORAL at 06:26

## 2018-01-01 RX ADMIN — GADOBUTROL 7.5 ML: 604.72 INJECTION INTRAVENOUS at 08:56

## 2018-01-01 RX ADMIN — SODIUM CHLORIDE 1000 ML: 9 INJECTION, SOLUTION INTRAVENOUS at 22:25

## 2018-01-01 RX ADMIN — GABAPENTIN 300 MG: 300 CAPSULE ORAL at 12:07

## 2018-01-01 RX ADMIN — POTASSIUM CHLORIDE 40 MEQ: 1500 TABLET, EXTENDED RELEASE ORAL at 08:00

## 2018-01-01 RX ADMIN — CEFTRIAXONE SODIUM 1 G: 1 INJECTION, POWDER, FOR SOLUTION INTRAMUSCULAR; INTRAVENOUS at 05:14

## 2018-01-01 RX ADMIN — NOREPINEPHRINE BITARTRATE 2 MCG/MIN: 1 INJECTION INTRAVENOUS at 16:36

## 2018-01-01 RX ADMIN — POTASSIUM PHOSPHATE, MONOBASIC AND POTASSIUM PHOSPHATE, DIBASIC 30 MMOL: 224; 236 INJECTION, SOLUTION INTRAVENOUS at 09:40

## 2018-01-01 RX ADMIN — MIDODRINE HYDROCHLORIDE 10 MG: 5 TABLET ORAL at 06:33

## 2018-01-01 RX ADMIN — PROPOFOL 60 MCG/KG/MIN: 10 INJECTION, EMULSION INTRAVENOUS at 05:44

## 2018-01-01 RX ADMIN — DIPHENHYDRAMINE HCL 25 MG: 25 TABLET ORAL at 00:19

## 2018-01-01 RX ADMIN — NOREPINEPHRINE BITARTRATE 30 MCG/MIN: 1 INJECTION INTRAVENOUS at 10:46

## 2018-01-01 RX ADMIN — FLUDROCORTISONE ACETATE 0.1 MG: 0.1 TABLET ORAL at 07:58

## 2018-01-01 RX ADMIN — NOREPINEPHRINE BITARTRATE 30 MCG/MIN: 1 INJECTION INTRAVENOUS at 05:44

## 2018-01-01 RX ADMIN — ENOXAPARIN SODIUM 40 MG: 100 INJECTION SUBCUTANEOUS at 08:38

## 2018-01-01 RX ADMIN — Medication 25 MCG: at 20:53

## 2018-01-01 ASSESSMENT — LIFESTYLE VARIABLES
TREMOR: NO TREMOR
TOTAL SCORE: 3
HEADACHE, FULLNESS IN HEAD: NOT PRESENT
AGITATION: NORMAL ACTIVITY
ANXIETY: MILDLY ANXIOUS
AGITATION: NORMAL ACTIVITY
AUDITORY DISTURBANCES: NOT PRESENT
PAROXYSMAL SWEATS: NO SWEAT VISIBLE
ANXIETY: MILDLY ANXIOUS
VISUAL DISTURBANCES: NOT PRESENT
ANXIETY: *
AUDITORY DISTURBANCES: NOT PRESENT
AUDITORY DISTURBANCES: NOT PRESENT
AGITATION: NORMAL ACTIVITY
TOTAL SCORE: 1
ANXIETY: NO ANXIETY (AT EASE)
ANXIETY: MILDLY ANXIOUS
HEADACHE, FULLNESS IN HEAD: NOT PRESENT
VISUAL DISTURBANCES: NOT PRESENT
VISUAL DISTURBANCES: NOT PRESENT
CONSUMPTION TOTAL: POSITIVE
TOTAL SCORE: 7
ANXIETY: MILDLY ANXIOUS
AGITATION: NORMAL ACTIVITY
AGITATION: NORMAL ACTIVITY
VISUAL DISTURBANCES: NOT PRESENT
HEADACHE, FULLNESS IN HEAD: NOT PRESENT
TOTAL SCORE: 1
TREMOR: TREMOR NOT VISIBLE BUT CAN BE FELT, FINGERTIP TO FINGERTIP
TREMOR: TREMOR NOT VISIBLE BUT CAN BE FELT, FINGERTIP TO FINGERTIP
TOTAL SCORE: 4
HAVE PEOPLE ANNOYED YOU BY CRITICIZING YOUR DRINKING: NO
TOTAL SCORE: 3
PAROXYSMAL SWEATS: *
AUDITORY DISTURBANCES: NOT PRESENT
ORIENTATION AND CLOUDING OF SENSORIUM: ORIENTED AND CAN DO SERIAL ADDITIONS
AUDITORY DISTURBANCES: NOT PRESENT
PAROXYSMAL SWEATS: NO SWEAT VISIBLE
TREMOR: NO TREMOR
NAUSEA AND VOMITING: NO NAUSEA AND NO VOMITING
PAROXYSMAL SWEATS: BARELY PERCEPTIBLE SWEATING. PALMS MOIST
TOTAL SCORE: 1
NAUSEA AND VOMITING: NO NAUSEA AND NO VOMITING
AGITATION: SOMEWHAT MORE THAN NORMAL ACTIVITY
AGITATION: SOMEWHAT MORE THAN NORMAL ACTIVITY
ORIENTATION AND CLOUDING OF SENSORIUM: DISORIENTED FOR PLACE AND / OR PERSON
CONSUMPTION TOTAL: POSITIVE
VISUAL DISTURBANCES: NOT PRESENT
PAROXYSMAL SWEATS: BARELY PERCEPTIBLE SWEATING. PALMS MOIST
PAROXYSMAL SWEATS: NO SWEAT VISIBLE
HEADACHE, FULLNESS IN HEAD: NOT PRESENT
HEADACHE, FULLNESS IN HEAD: NOT PRESENT
AVERAGE NUMBER OF DAYS PER WEEK YOU HAVE A DRINK CONTAINING ALCOHOL: 7
PAROXYSMAL SWEATS: BARELY PERCEPTIBLE SWEATING. PALMS MOIST
VISUAL DISTURBANCES: NOT PRESENT
ANXIETY: NO ANXIETY (AT EASE)
AUDITORY DISTURBANCES: NOT PRESENT
AGITATION: NORMAL ACTIVITY
VISUAL DISTURBANCES: NOT PRESENT
VISUAL DISTURBANCES: NOT PRESENT
TOTAL SCORE: 3
ORIENTATION AND CLOUDING OF SENSORIUM: ORIENTED AND CAN DO SERIAL ADDITIONS
HEADACHE, FULLNESS IN HEAD: NOT PRESENT
VISUAL DISTURBANCES: NOT PRESENT
NAUSEA AND VOMITING: NO NAUSEA AND NO VOMITING
HEADACHE, FULLNESS IN HEAD: NOT PRESENT
DOES PATIENT WANT TO STOP DRINKING: YES
HEADACHE, FULLNESS IN HEAD: NOT PRESENT
VISUAL DISTURBANCES: NOT PRESENT
ANXIETY: NO ANXIETY (AT EASE)
AGITATION: *
NAUSEA AND VOMITING: MILD NAUSEA WITH NO VOMITING
AGITATION: NORMAL ACTIVITY
NAUSEA AND VOMITING: NO NAUSEA AND NO VOMITING
ANXIETY: NO ANXIETY (AT EASE)
NAUSEA AND VOMITING: NO NAUSEA AND NO VOMITING
PAROXYSMAL SWEATS: NO SWEAT VISIBLE
TOTAL SCORE: 3
ANXIETY: MILDLY ANXIOUS
TOTAL SCORE: MODERATE ITCHING, PINS AND NEEDLES SENSATION, BURNING OR NUMBNESS
ORIENTATION AND CLOUDING OF SENSORIUM: ORIENTED AND CAN DO SERIAL ADDITIONS
TOTAL SCORE: 5
HEADACHE, FULLNESS IN HEAD: NOT PRESENT
TOTAL SCORE: 1
NAUSEA AND VOMITING: NO NAUSEA AND NO VOMITING
PAROXYSMAL SWEATS: NO SWEAT VISIBLE
ORIENTATION AND CLOUDING OF SENSORIUM: ORIENTED AND CAN DO SERIAL ADDITIONS
TREMOR: TREMOR NOT VISIBLE BUT CAN BE FELT, FINGERTIP TO FINGERTIP
VISUAL DISTURBANCES: NOT PRESENT
ORIENTATION AND CLOUDING OF SENSORIUM: ORIENTED AND CAN DO SERIAL ADDITIONS
PAROXYSMAL SWEATS: BARELY PERCEPTIBLE SWEATING. PALMS MOIST
AUDITORY DISTURBANCES: NOT PRESENT
PAROXYSMAL SWEATS: BARELY PERCEPTIBLE SWEATING. PALMS MOIST
AGITATION: NORMAL ACTIVITY
ORIENTATION AND CLOUDING OF SENSORIUM: DISORIENTED FOR PLACE AND / OR PERSON
HEADACHE, FULLNESS IN HEAD: NOT PRESENT
ORIENTATION AND CLOUDING OF SENSORIUM: ORIENTED AND CAN DO SERIAL ADDITIONS
PAROXYSMAL SWEATS: NO SWEAT VISIBLE
TOTAL SCORE: VERY MILD ITCHING, PINS AND NEEDLES SENSATION, BURNING OR NUMBNESS
ANXIETY: NO ANXIETY (AT EASE)
TACTILE DISTURBANCES: VERY MILD ITCHING, PINS AND NEEDLES SENSATION, BURNING OR NUMBNESS
AGITATION: NORMAL ACTIVITY
AUDITORY DISTURBANCES: NOT PRESENT
ORIENTATION AND CLOUDING OF SENSORIUM: ORIENTED AND CAN DO SERIAL ADDITIONS
TREMOR: TREMOR NOT VISIBLE BUT CAN BE FELT, FINGERTIP TO FINGERTIP
TACTILE DISTURBANCES: VERY MILD ITCHING, PINS AND NEEDLES SENSATION, BURNING OR NUMBNESS
AUDITORY DISTURBANCES: NOT PRESENT
NAUSEA AND VOMITING: NO NAUSEA AND NO VOMITING
VISUAL DISTURBANCES: NOT PRESENT
TREMOR: NO TREMOR
ANXIETY: NO ANXIETY (AT EASE)
ORIENTATION AND CLOUDING OF SENSORIUM: ORIENTED AND CAN DO SERIAL ADDITIONS
AUDITORY DISTURBANCES: NOT PRESENT
TOTAL SCORE: 5
VISUAL DISTURBANCES: NOT PRESENT
AUDITORY DISTURBANCES: NOT PRESENT
ORIENTATION AND CLOUDING OF SENSORIUM: ORIENTED AND CAN DO SERIAL ADDITIONS
TREMOR: NO TREMOR
NAUSEA AND VOMITING: NO NAUSEA AND NO VOMITING
TOTAL SCORE: 4
ORIENTATION AND CLOUDING OF SENSORIUM: DISORIENTED FOR PLACE AND / OR PERSON
TREMOR: TREMOR NOT VISIBLE BUT CAN BE FELT, FINGERTIP TO FINGERTIP
ORIENTATION AND CLOUDING OF SENSORIUM: DISORIENTED FOR PLACE AND / OR PERSON
AUDITORY DISTURBANCES: NOT PRESENT
TOTAL SCORE: 1
AUDITORY DISTURBANCES: NOT PRESENT
ORIENTATION AND CLOUDING OF SENSORIUM: ORIENTED AND CAN DO SERIAL ADDITIONS
NAUSEA AND VOMITING: NO NAUSEA AND NO VOMITING
PAROXYSMAL SWEATS: NO SWEAT VISIBLE
TOTAL SCORE: 2
NAUSEA AND VOMITING: MILD NAUSEA WITH NO VOMITING
HAVE PEOPLE ANNOYED YOU BY CRITICIZING YOUR DRINKING: NO
NAUSEA AND VOMITING: NO NAUSEA AND NO VOMITING
ANXIETY: MILDLY ANXIOUS
TREMOR: TREMOR NOT VISIBLE BUT CAN BE FELT, FINGERTIP TO FINGERTIP
TOTAL SCORE: 3
VISUAL DISTURBANCES: NOT PRESENT
AUDITORY DISTURBANCES: NOT PRESENT
ORIENTATION AND CLOUDING OF SENSORIUM: ORIENTED AND CAN DO SERIAL ADDITIONS
TOTAL SCORE: 2
EVER_SMOKED: NEVER
AUDITORY DISTURBANCES: MODERATELY SEVERE HALLUCINATIONS
HEADACHE, FULLNESS IN HEAD: NOT PRESENT
TOTAL SCORE: MODERATE ITCHING, PINS AND NEEDLES SENSATION, BURNING OR NUMBNESS
TOTAL SCORE: 5
VISUAL DISTURBANCES: NOT PRESENT
AGITATION: NORMAL ACTIVITY
TREMOR: NO TREMOR
HEADACHE, FULLNESS IN HEAD: NOT PRESENT
VISUAL DISTURBANCES: NOT PRESENT
PAROXYSMAL SWEATS: NO SWEAT VISIBLE
VISUAL DISTURBANCES: NOT PRESENT
TREMOR: NO TREMOR
HEADACHE, FULLNESS IN HEAD: NOT PRESENT
TOTAL SCORE: 3
HEADACHE, FULLNESS IN HEAD: NOT PRESENT
TREMOR: NO TREMOR
AGITATION: NORMAL ACTIVITY
TACTILE DISTURBANCES: VERY MILD ITCHING, PINS AND NEEDLES SENSATION, BURNING OR NUMBNESS
HEADACHE, FULLNESS IN HEAD: NOT PRESENT
AUDITORY DISTURBANCES: NOT PRESENT
HEADACHE, FULLNESS IN HEAD: NOT PRESENT
ANXIETY: NO ANXIETY (AT EASE)
VISUAL DISTURBANCES: NOT PRESENT
TREMOR: TREMOR NOT VISIBLE BUT CAN BE FELT, FINGERTIP TO FINGERTIP
TREMOR: NO TREMOR
ANXIETY: NO ANXIETY (AT EASE)
TREMOR: *
TACTILE DISTURBANCES: MILD ITCHING, PINS AND NEEDLES SENSATION, BURNING OR NUMBNESS
AGITATION: NORMAL ACTIVITY
AUDITORY DISTURBANCES: NOT PRESENT
PAROXYSMAL SWEATS: NO SWEAT VISIBLE
AGITATION: SOMEWHAT MORE THAN NORMAL ACTIVITY
VISUAL DISTURBANCES: NOT PRESENT
VISUAL DISTURBANCES: VERY MILD SENSITIVITY
AUDITORY DISTURBANCES: NOT PRESENT
HAVE YOU EVER FELT YOU SHOULD CUT DOWN ON YOUR DRINKING: YES
TOTAL SCORE: 8
AUDITORY DISTURBANCES: NOT PRESENT
VISUAL DISTURBANCES: NOT PRESENT
NAUSEA AND VOMITING: NO NAUSEA AND NO VOMITING
NAUSEA AND VOMITING: NO NAUSEA AND NO VOMITING
ORIENTATION AND CLOUDING OF SENSORIUM: ORIENTED AND CAN DO SERIAL ADDITIONS
HEADACHE, FULLNESS IN HEAD: NOT PRESENT
AGITATION: NORMAL ACTIVITY
ORIENTATION AND CLOUDING OF SENSORIUM: ORIENTED AND CAN DO SERIAL ADDITIONS
TREMOR: NO TREMOR
HEADACHE, FULLNESS IN HEAD: NOT PRESENT
AGITATION: NORMAL ACTIVITY
TOTAL SCORE: 6
NAUSEA AND VOMITING: NO NAUSEA AND NO VOMITING
SUBSTANCE_ABUSE: 1
HEADACHE, FULLNESS IN HEAD: NOT PRESENT
TREMOR: TREMOR NOT VISIBLE BUT CAN BE FELT, FINGERTIP TO FINGERTIP
TOTAL SCORE: 8
TOTAL SCORE: 1
HEADACHE, FULLNESS IN HEAD: NOT PRESENT
VISUAL DISTURBANCES: MODERATELY SEVERE HALLUCINATIONS
ANXIETY: NO ANXIETY (AT EASE)
AUDITORY DISTURBANCES: MILD HARSHNESS OR ABILITY TO FRIGHTEN
HEADACHE, FULLNESS IN HEAD: NOT PRESENT
TOTAL SCORE: 2
NAUSEA AND VOMITING: NO NAUSEA AND NO VOMITING
AUDITORY DISTURBANCES: NOT PRESENT
TOTAL SCORE: MODERATE ITCHING, PINS AND NEEDLES SENSATION, BURNING OR NUMBNESS
ORIENTATION AND CLOUDING OF SENSORIUM: ORIENTED AND CAN DO SERIAL ADDITIONS
VISUAL DISTURBANCES: MODERATELY SEVERE HALLUCINATIONS
TOTAL SCORE: 8
HEADACHE, FULLNESS IN HEAD: NOT PRESENT
AGITATION: NORMAL ACTIVITY
VISUAL DISTURBANCES: NOT PRESENT
ORIENTATION AND CLOUDING OF SENSORIUM: ORIENTED AND CAN DO SERIAL ADDITIONS
AUDITORY DISTURBANCES: MODERATELY SEVERE HALLUCINATIONS
TOTAL SCORE: 1
AUDITORY DISTURBANCES: NOT PRESENT
VISUAL DISTURBANCES: NOT PRESENT
ORIENTATION AND CLOUDING OF SENSORIUM: ORIENTED AND CAN DO SERIAL ADDITIONS
AUDITORY DISTURBANCES: NOT PRESENT
TREMOR: NO TREMOR
VISUAL DISTURBANCES: NOT PRESENT
AGITATION: NORMAL ACTIVITY
TOTAL SCORE: 5
AUDITORY DISTURBANCES: NOT PRESENT
TREMOR: NO TREMOR
NAUSEA AND VOMITING: NO NAUSEA AND NO VOMITING
ORIENTATION AND CLOUDING OF SENSORIUM: ORIENTED AND CAN DO SERIAL ADDITIONS
HEADACHE, FULLNESS IN HEAD: VERY MILD
AGITATION: SOMEWHAT MORE THAN NORMAL ACTIVITY
NAUSEA AND VOMITING: NO NAUSEA AND NO VOMITING
HEADACHE, FULLNESS IN HEAD: NOT PRESENT
TOTAL SCORE: 9
ORIENTATION AND CLOUDING OF SENSORIUM: CANNOT DO SERIAL ADDITIONS OR IS UNCERTAIN ABOUT DATE
TOTAL SCORE: 0
ANXIETY: MILDLY ANXIOUS
TOTAL SCORE: 4
NAUSEA AND VOMITING: NO NAUSEA AND NO VOMITING
NAUSEA AND VOMITING: NO NAUSEA AND NO VOMITING
HEADACHE, FULLNESS IN HEAD: NOT PRESENT
TREMOR: *
HEADACHE, FULLNESS IN HEAD: NOT PRESENT
ANXIETY: NO ANXIETY (AT EASE)
AGITATION: *
HEADACHE, FULLNESS IN HEAD: NOT PRESENT
PAROXYSMAL SWEATS: BARELY PERCEPTIBLE SWEATING. PALMS MOIST
VISUAL DISTURBANCES: VERY MILD SENSITIVITY
HEADACHE, FULLNESS IN HEAD: NOT PRESENT
PAROXYSMAL SWEATS: *
AGITATION: NORMAL ACTIVITY
VISUAL DISTURBANCES: NOT PRESENT
HEADACHE, FULLNESS IN HEAD: NOT PRESENT
VISUAL DISTURBANCES: NOT PRESENT
ORIENTATION AND CLOUDING OF SENSORIUM: ORIENTED AND CAN DO SERIAL ADDITIONS
NAUSEA AND VOMITING: NO NAUSEA AND NO VOMITING
ORIENTATION AND CLOUDING OF SENSORIUM: ORIENTED AND CAN DO SERIAL ADDITIONS
AGITATION: SOMEWHAT MORE THAN NORMAL ACTIVITY
ORIENTATION AND CLOUDING OF SENSORIUM: ORIENTED AND CAN DO SERIAL ADDITIONS
HEADACHE, FULLNESS IN HEAD: NOT PRESENT
ANXIETY: NO ANXIETY (AT EASE)
VISUAL DISTURBANCES: NOT PRESENT
NAUSEA AND VOMITING: NO NAUSEA AND NO VOMITING
AUDITORY DISTURBANCES: NOT PRESENT
DOES PATIENT WANT TO TALK TO SOMEONE ABOUT QUITTING: NO
AUDITORY DISTURBANCES: NOT PRESENT
PAROXYSMAL SWEATS: NO SWEAT VISIBLE
TREMOR: NO TREMOR
HEADACHE, FULLNESS IN HEAD: NOT PRESENT
HEADACHE, FULLNESS IN HEAD: NOT PRESENT
NAUSEA AND VOMITING: NO NAUSEA AND NO VOMITING
AUDITORY DISTURBANCES: NOT PRESENT
PAROXYSMAL SWEATS: NO SWEAT VISIBLE
AGITATION: NORMAL ACTIVITY
TREMOR: NO TREMOR
VISUAL DISTURBANCES: NOT PRESENT
PAROXYSMAL SWEATS: NO SWEAT VISIBLE
AGITATION: NORMAL ACTIVITY
TOTAL SCORE: 7
TOTAL SCORE: VERY MILD ITCHING, PINS AND NEEDLES SENSATION, BURNING OR NUMBNESS
TREMOR: NO TREMOR
EVER FELT BAD OR GUILTY ABOUT YOUR DRINKING: YES
HOW MANY TIMES IN THE PAST YEAR HAVE YOU HAD 5 OR MORE DRINKS IN A DAY: 365
NAUSEA AND VOMITING: NO NAUSEA AND NO VOMITING
VISUAL DISTURBANCES: NOT PRESENT
AUDITORY DISTURBANCES: NOT PRESENT
NAUSEA AND VOMITING: NO NAUSEA AND NO VOMITING
ANXIETY: NO ANXIETY (AT EASE)
ORIENTATION AND CLOUDING OF SENSORIUM: ORIENTED AND CAN DO SERIAL ADDITIONS
AUDITORY DISTURBANCES: NOT PRESENT
ANXIETY: NO ANXIETY (AT EASE)
AGITATION: NORMAL ACTIVITY
HEADACHE, FULLNESS IN HEAD: NOT PRESENT
AGITATION: NORMAL ACTIVITY
NAUSEA AND VOMITING: NO NAUSEA AND NO VOMITING
VISUAL DISTURBANCES: MODERATE SENSITIVITY
TREMOR: TREMOR NOT VISIBLE BUT CAN BE FELT, FINGERTIP TO FINGERTIP
AUDITORY DISTURBANCES: NOT PRESENT
VISUAL DISTURBANCES: NOT PRESENT
TOTAL SCORE: 2
TOTAL SCORE: VERY MILD ITCHING, PINS AND NEEDLES SENSATION, BURNING OR NUMBNESS
VISUAL DISTURBANCES: NOT PRESENT
ANXIETY: MILDLY ANXIOUS
AGITATION: NORMAL ACTIVITY
PAROXYSMAL SWEATS: NO SWEAT VISIBLE
TREMOR: *
AGITATION: NORMAL ACTIVITY
ON A TYPICAL DAY WHEN YOU DRINK ALCOHOL HOW MANY DRINKS DO YOU HAVE: 2
TOTAL SCORE: 6
CONSUMPTION TOTAL: INCOMPLETE
VISUAL DISTURBANCES: NOT PRESENT
HAVE YOU EVER FELT YOU SHOULD CUT DOWN ON YOUR DRINKING: YES
AGITATION: NORMAL ACTIVITY
VISUAL DISTURBANCES: NOT PRESENT
PAROXYSMAL SWEATS: NO SWEAT VISIBLE
HEADACHE, FULLNESS IN HEAD: NOT PRESENT
ORIENTATION AND CLOUDING OF SENSORIUM: ORIENTED AND CAN DO SERIAL ADDITIONS
NAUSEA AND VOMITING: NO NAUSEA AND NO VOMITING
NAUSEA AND VOMITING: NO NAUSEA AND NO VOMITING
AUDITORY DISTURBANCES: MODERATELY SEVERE HALLUCINATIONS
NAUSEA AND VOMITING: NO NAUSEA AND NO VOMITING
AVERAGE NUMBER OF DAYS PER WEEK YOU HAVE A DRINK CONTAINING ALCOHOL: 7
AUDITORY DISTURBANCES: NOT PRESENT
ON A TYPICAL DAY WHEN YOU DRINK ALCOHOL HOW MANY DRINKS DO YOU HAVE: 2
AGITATION: SOMEWHAT MORE THAN NORMAL ACTIVITY
TOTAL SCORE: MODERATE ITCHING, PINS AND NEEDLES SENSATION, BURNING OR NUMBNESS
DOES PATIENT WANT TO STOP DRINKING: YES
NAUSEA AND VOMITING: NO NAUSEA AND NO VOMITING
AGITATION: NORMAL ACTIVITY
VISUAL DISTURBANCES: NOT PRESENT
PAROXYSMAL SWEATS: NO SWEAT VISIBLE
AUDITORY DISTURBANCES: NOT PRESENT
TREMOR: NO TREMOR
ORIENTATION AND CLOUDING OF SENSORIUM: ORIENTED AND CAN DO SERIAL ADDITIONS
ORIENTATION AND CLOUDING OF SENSORIUM: ORIENTED AND CAN DO SERIAL ADDITIONS
TREMOR: TREMOR NOT VISIBLE BUT CAN BE FELT, FINGERTIP TO FINGERTIP
ANXIETY: NO ANXIETY (AT EASE)
TREMOR: NO TREMOR
TREMOR: TREMOR NOT VISIBLE BUT CAN BE FELT, FINGERTIP TO FINGERTIP
ORIENTATION AND CLOUDING OF SENSORIUM: ORIENTED AND CAN DO SERIAL ADDITIONS
TOTAL SCORE: VERY MILD ITCHING, PINS AND NEEDLES SENSATION, BURNING OR NUMBNESS
TREMOR: *
TREMOR: TREMOR NOT VISIBLE BUT CAN BE FELT, FINGERTIP TO FINGERTIP
AGITATION: NORMAL ACTIVITY
TREMOR: NO TREMOR
VISUAL DISTURBANCES: NOT PRESENT
NAUSEA AND VOMITING: NO NAUSEA AND NO VOMITING
NAUSEA AND VOMITING: NO NAUSEA AND NO VOMITING
HEADACHE, FULLNESS IN HEAD: NOT PRESENT
ORIENTATION AND CLOUDING OF SENSORIUM: ORIENTED AND CAN DO SERIAL ADDITIONS
NAUSEA AND VOMITING: NO NAUSEA AND NO VOMITING
AGITATION: NORMAL ACTIVITY
PAROXYSMAL SWEATS: NO SWEAT VISIBLE
TOTAL SCORE: 5
ORIENTATION AND CLOUDING OF SENSORIUM: ORIENTED AND CAN DO SERIAL ADDITIONS
ORIENTATION AND CLOUDING OF SENSORIUM: ORIENTED AND CAN DO SERIAL ADDITIONS
TOTAL SCORE: MODERATE ITCHING, PINS AND NEEDLES SENSATION, BURNING OR NUMBNESS
AGITATION: NORMAL ACTIVITY
VISUAL DISTURBANCES: NOT PRESENT
AUDITORY DISTURBANCES: NOT PRESENT
ORIENTATION AND CLOUDING OF SENSORIUM: DISORIENTED FOR PLACE AND / OR PERSON
AUDITORY DISTURBANCES: MODERATELY SEVERE HALLUCINATIONS
ANXIETY: *
EVER_SMOKED: NEVER
TOTAL SCORE: MILD ITCHING, PINS AND NEEDLES SENSATION, BURNING OR NUMBNESS
TOTAL SCORE: 4
TREMOR: NO TREMOR
AUDITORY DISTURBANCES: NOT PRESENT
HEADACHE, FULLNESS IN HEAD: NOT PRESENT
ORIENTATION AND CLOUDING OF SENSORIUM: ORIENTED AND CAN DO SERIAL ADDITIONS
AUDITORY DISTURBANCES: NOT PRESENT
ANXIETY: NO ANXIETY (AT EASE)
TOTAL SCORE: 3
TOTAL SCORE: 4
ALCOHOL_USE: YES
TREMOR: TREMOR NOT VISIBLE BUT CAN BE FELT, FINGERTIP TO FINGERTIP
TREMOR: *
NAUSEA AND VOMITING: NO NAUSEA AND NO VOMITING
TREMOR: NO TREMOR
ANXIETY: MODERATELY ANXIOUS OR GUARDED, SO ANXIETY IS INFERRED
TREMOR: TREMOR NOT VISIBLE BUT CAN BE FELT, FINGERTIP TO FINGERTIP
AGITATION: NORMAL ACTIVITY
VISUAL DISTURBANCES: NOT PRESENT
HEADACHE, FULLNESS IN HEAD: NOT PRESENT
HEADACHE, FULLNESS IN HEAD: NOT PRESENT
AUDITORY DISTURBANCES: NOT PRESENT
TOTAL SCORE: 5
VISUAL DISTURBANCES: NOT PRESENT
TOTAL SCORE: MODERATE ITCHING, PINS AND NEEDLES SENSATION, BURNING OR NUMBNESS
PAROXYSMAL SWEATS: NO SWEAT VISIBLE
AGITATION: NORMAL ACTIVITY
TREMOR: NO TREMOR
PAROXYSMAL SWEATS: BARELY PERCEPTIBLE SWEATING. PALMS MOIST
PAROXYSMAL SWEATS: NO SWEAT VISIBLE
TOTAL SCORE: 0
ANXIETY: MILDLY ANXIOUS
NAUSEA AND VOMITING: MILD NAUSEA WITH NO VOMITING
AUDITORY DISTURBANCES: NOT PRESENT
AUDITORY DISTURBANCES: NOT PRESENT
PAROXYSMAL SWEATS: NO SWEAT VISIBLE
TOTAL SCORE: 7
AGITATION: NORMAL ACTIVITY
ANXIETY: MILDLY ANXIOUS
SUBSTANCE_ABUSE: 1
PAROXYSMAL SWEATS: NO SWEAT VISIBLE
PAROXYSMAL SWEATS: NO SWEAT VISIBLE
HEADACHE, FULLNESS IN HEAD: NOT PRESENT
AGITATION: NORMAL ACTIVITY
VISUAL DISTURBANCES: MILD SENSITIVITY
TOTAL SCORE: 3
NAUSEA AND VOMITING: NO NAUSEA AND NO VOMITING
HEADACHE, FULLNESS IN HEAD: NOT PRESENT
HEADACHE, FULLNESS IN HEAD: NOT PRESENT
TACTILE DISTURBANCES: MILD ITCHING, PINS AND NEEDLES SENSATION, BURNING OR NUMBNESS
ANXIETY: NO ANXIETY (AT EASE)
NAUSEA AND VOMITING: NO NAUSEA AND NO VOMITING
VISUAL DISTURBANCES: NOT PRESENT
PAROXYSMAL SWEATS: NO SWEAT VISIBLE
TREMOR: NO TREMOR
TREMOR: NO TREMOR
NAUSEA AND VOMITING: NO NAUSEA AND NO VOMITING
TOTAL SCORE: 0
ANXIETY: NO ANXIETY (AT EASE)
TREMOR: NO TREMOR
NAUSEA AND VOMITING: NO NAUSEA AND NO VOMITING
AGITATION: NORMAL ACTIVITY
ORIENTATION AND CLOUDING OF SENSORIUM: ORIENTED AND CAN DO SERIAL ADDITIONS
ANXIETY: NO ANXIETY (AT EASE)
PAROXYSMAL SWEATS: NO SWEAT VISIBLE
PAROXYSMAL SWEATS: NO SWEAT VISIBLE
ORIENTATION AND CLOUDING OF SENSORIUM: DISORIENTED FOR PLACE AND / OR PERSON
ANXIETY: MODERATELY ANXIOUS OR GUARDED, SO ANXIETY IS INFERRED
TOTAL SCORE: 1
TREMOR: NO TREMOR
ANXIETY: MILDLY ANXIOUS
ANXIETY: NO ANXIETY (AT EASE)
VISUAL DISTURBANCES: NOT PRESENT
PAROXYSMAL SWEATS: NO SWEAT VISIBLE
TREMOR: TREMOR NOT VISIBLE BUT CAN BE FELT, FINGERTIP TO FINGERTIP
NAUSEA AND VOMITING: NO NAUSEA AND NO VOMITING
PAROXYSMAL SWEATS: BARELY PERCEPTIBLE SWEATING. PALMS MOIST
PAROXYSMAL SWEATS: NO SWEAT VISIBLE
ORIENTATION AND CLOUDING OF SENSORIUM: DISORIENTED FOR PLACE AND / OR PERSON
HEADACHE, FULLNESS IN HEAD: VERY MILD
AGITATION: NORMAL ACTIVITY
VISUAL DISTURBANCES: NOT PRESENT
NAUSEA AND VOMITING: NO NAUSEA AND NO VOMITING
VISUAL DISTURBANCES: NOT PRESENT
VISUAL DISTURBANCES: NOT PRESENT
TREMOR: MODERATE TREMOR WITH ARMS EXTENDED
HEADACHE, FULLNESS IN HEAD: NOT PRESENT
PAROXYSMAL SWEATS: NO SWEAT VISIBLE
NAUSEA AND VOMITING: NO NAUSEA AND NO VOMITING
VISUAL DISTURBANCES: NOT PRESENT
AGITATION: NORMAL ACTIVITY
NAUSEA AND VOMITING: NO NAUSEA AND NO VOMITING
TOTAL SCORE: 23
TREMOR: NO TREMOR
VISUAL DISTURBANCES: NOT PRESENT
VISUAL DISTURBANCES: NOT PRESENT
HEADACHE, FULLNESS IN HEAD: NOT PRESENT
TOTAL SCORE: 4
PAROXYSMAL SWEATS: NO SWEAT VISIBLE
ANXIETY: MILDLY ANXIOUS
HEADACHE, FULLNESS IN HEAD: NOT PRESENT
TOTAL SCORE: VERY MILD ITCHING, PINS AND NEEDLES SENSATION, BURNING OR NUMBNESS
TOTAL SCORE: VERY MILD ITCHING, PINS AND NEEDLES SENSATION, BURNING OR NUMBNESS
TOTAL SCORE: 0
ANXIETY: NO ANXIETY (AT EASE)
SUBSTANCE_ABUSE: 1
TREMOR: *
NAUSEA AND VOMITING: NO NAUSEA AND NO VOMITING
HAVE PEOPLE ANNOYED YOU BY CRITICIZING YOUR DRINKING: YES
TOTAL SCORE: 4
NAUSEA AND VOMITING: NO NAUSEA AND NO VOMITING
TOTAL SCORE: MILD ITCHING, PINS AND NEEDLES SENSATION, BURNING OR NUMBNESS
ANXIETY: NO ANXIETY (AT EASE)
ANXIETY: NO ANXIETY (AT EASE)
EVER HAD A DRINK FIRST THING IN THE MORNING TO STEADY YOUR NERVES TO GET RID OF A HANGOVER: YES
TOTAL SCORE: 2
NAUSEA AND VOMITING: NO NAUSEA AND NO VOMITING
AGITATION: NORMAL ACTIVITY
PAROXYSMAL SWEATS: NO SWEAT VISIBLE
ORIENTATION AND CLOUDING OF SENSORIUM: ORIENTED AND CAN DO SERIAL ADDITIONS
VISUAL DISTURBANCES: MODERATELY SEVERE HALLUCINATIONS
HEADACHE, FULLNESS IN HEAD: NOT PRESENT
TREMOR: NO TREMOR
AUDITORY DISTURBANCES: MILD HARSHNESS OR ABILITY TO FRIGHTEN
VISUAL DISTURBANCES: NOT PRESENT
ORIENTATION AND CLOUDING OF SENSORIUM: DISORIENTED FOR PLACE AND / OR PERSON
HEADACHE, FULLNESS IN HEAD: NOT PRESENT
ORIENTATION AND CLOUDING OF SENSORIUM: ORIENTED AND CAN DO SERIAL ADDITIONS
AGITATION: NORMAL ACTIVITY
AGITATION: NORMAL ACTIVITY
PAROXYSMAL SWEATS: NO SWEAT VISIBLE
TOTAL SCORE: 26
TREMOR: NO TREMOR
ALCOHOL_USE: YES
ORIENTATION AND CLOUDING OF SENSORIUM: ORIENTED AND CAN DO SERIAL ADDITIONS
TOTAL SCORE: VERY MILD ITCHING, PINS AND NEEDLES SENSATION, BURNING OR NUMBNESS
HEADACHE, FULLNESS IN HEAD: NOT PRESENT
HEADACHE, FULLNESS IN HEAD: NOT PRESENT
AGITATION: SOMEWHAT MORE THAN NORMAL ACTIVITY
EVER HAD A DRINK FIRST THING IN THE MORNING TO STEADY YOUR NERVES TO GET RID OF A HANGOVER: YES
NAUSEA AND VOMITING: NO NAUSEA AND NO VOMITING
TREMOR: *
PAROXYSMAL SWEATS: NO SWEAT VISIBLE
ANXIETY: MODERATELY ANXIOUS OR GUARDED, SO ANXIETY IS INFERRED
AUDITORY DISTURBANCES: NOT PRESENT
ANXIETY: MODERATELY ANXIOUS OR GUARDED, SO ANXIETY IS INFERRED
HEADACHE, FULLNESS IN HEAD: NOT PRESENT
TOTAL SCORE: 1
AGITATION: NORMAL ACTIVITY
EVER_SMOKED: NEVER
TREMOR: NO TREMOR
TREMOR: NO TREMOR
ANXIETY: NO ANXIETY (AT EASE)
NAUSEA AND VOMITING: NO NAUSEA AND NO VOMITING
SUBSTANCE_ABUSE: 1
TOTAL SCORE: 3
AUDITORY DISTURBANCES: NOT PRESENT
AUDITORY DISTURBANCES: NOT PRESENT
TREMOR: NO TREMOR
VISUAL DISTURBANCES: NOT PRESENT
VISUAL DISTURBANCES: NOT PRESENT
TOTAL SCORE: VERY MILD ITCHING, PINS AND NEEDLES SENSATION, BURNING OR NUMBNESS
TOTAL SCORE: MODERATELY SEVERE HALLUCINATIONS
PAROXYSMAL SWEATS: NO SWEAT VISIBLE
PAROXYSMAL SWEATS: BARELY PERCEPTIBLE SWEATING. PALMS MOIST
ANXIETY: *
AUDITORY DISTURBANCES: NOT PRESENT
ANXIETY: MILDLY ANXIOUS
TOTAL SCORE: VERY MILD ITCHING, PINS AND NEEDLES SENSATION, BURNING OR NUMBNESS
VISUAL DISTURBANCES: NOT PRESENT
SUBSTANCE_ABUSE: 1
AGITATION: NORMAL ACTIVITY
PAROXYSMAL SWEATS: BARELY PERCEPTIBLE SWEATING. PALMS MOIST
ANXIETY: NO ANXIETY (AT EASE)
TOTAL SCORE: 4
PAROXYSMAL SWEATS: BARELY PERCEPTIBLE SWEATING. PALMS MOIST
PAROXYSMAL SWEATS: *
ORIENTATION AND CLOUDING OF SENSORIUM: ORIENTED AND CAN DO SERIAL ADDITIONS
ORIENTATION AND CLOUDING OF SENSORIUM: ORIENTED AND CAN DO SERIAL ADDITIONS
NAUSEA AND VOMITING: NO NAUSEA AND NO VOMITING
DOES PATIENT WANT TO STOP DRINKING: YES
TOTAL SCORE: 0
TACTILE DISTURBANCES: VERY MILD ITCHING, PINS AND NEEDLES SENSATION, BURNING OR NUMBNESS
TREMOR: TREMOR NOT VISIBLE BUT CAN BE FELT, FINGERTIP TO FINGERTIP
HEADACHE, FULLNESS IN HEAD: NOT PRESENT
AUDITORY DISTURBANCES: NOT PRESENT
ORIENTATION AND CLOUDING OF SENSORIUM: ORIENTED AND CAN DO SERIAL ADDITIONS
ORIENTATION AND CLOUDING OF SENSORIUM: ORIENTED AND CAN DO SERIAL ADDITIONS
TREMOR: MODERATE TREMOR WITH ARMS EXTENDED
ORIENTATION AND CLOUDING OF SENSORIUM: ORIENTED AND CAN DO SERIAL ADDITIONS
NAUSEA AND VOMITING: NO NAUSEA AND NO VOMITING
TOTAL SCORE: VERY MILD ITCHING, PINS AND NEEDLES SENSATION, BURNING OR NUMBNESS
AUDITORY DISTURBANCES: NOT PRESENT
ORIENTATION AND CLOUDING OF SENSORIUM: ORIENTED AND CAN DO SERIAL ADDITIONS
AGITATION: NORMAL ACTIVITY
HOW MANY TIMES IN THE PAST YEAR HAVE YOU HAD 5 OR MORE DRINKS IN A DAY: 0
ORIENTATION AND CLOUDING OF SENSORIUM: ORIENTED AND CAN DO SERIAL ADDITIONS
TACTILE DISTURBANCES: VERY MILD ITCHING, PINS AND NEEDLES SENSATION, BURNING OR NUMBNESS
TOTAL SCORE: 4
NAUSEA AND VOMITING: NO NAUSEA AND NO VOMITING
PAROXYSMAL SWEATS: BARELY PERCEPTIBLE SWEATING. PALMS MOIST
PAROXYSMAL SWEATS: NO SWEAT VISIBLE
ANXIETY: NO ANXIETY (AT EASE)
ORIENTATION AND CLOUDING OF SENSORIUM: ORIENTED AND CAN DO SERIAL ADDITIONS
HEADACHE, FULLNESS IN HEAD: NOT PRESENT
TOTAL SCORE: VERY MILD ITCHING, PINS AND NEEDLES SENSATION, BURNING OR NUMBNESS
ANXIETY: MILDLY ANXIOUS
AUDITORY DISTURBANCES: NOT PRESENT
TOTAL SCORE: 4
VISUAL DISTURBANCES: NOT PRESENT
TOTAL SCORE: 2
TOTAL SCORE: 7
AGITATION: NORMAL ACTIVITY
TOTAL SCORE: 2
TREMOR: NO TREMOR
TREMOR: NO TREMOR
TOTAL SCORE: MODERATE ITCHING, PINS AND NEEDLES SENSATION, BURNING OR NUMBNESS
ANXIETY: MILDLY ANXIOUS
TOTAL SCORE: 5
NAUSEA AND VOMITING: NO NAUSEA AND NO VOMITING
NAUSEA AND VOMITING: NO NAUSEA AND NO VOMITING
VISUAL DISTURBANCES: NOT PRESENT
NAUSEA AND VOMITING: NO NAUSEA AND NO VOMITING
HEADACHE, FULLNESS IN HEAD: NOT PRESENT
AGITATION: NORMAL ACTIVITY
PAROXYSMAL SWEATS: *
HEADACHE, FULLNESS IN HEAD: NOT PRESENT
ORIENTATION AND CLOUDING OF SENSORIUM: ORIENTED AND CAN DO SERIAL ADDITIONS
AGITATION: *
ANXIETY: *
EVER HAD A DRINK FIRST THING IN THE MORNING TO STEADY YOUR NERVES TO GET RID OF A HANGOVER: YES
ANXIETY: NO ANXIETY (AT EASE)
VISUAL DISTURBANCES: NOT PRESENT
AUDITORY DISTURBANCES: NOT PRESENT
PAROXYSMAL SWEATS: *
NAUSEA AND VOMITING: NO NAUSEA AND NO VOMITING
TOTAL SCORE: VERY MILD ITCHING, PINS AND NEEDLES SENSATION, BURNING OR NUMBNESS
PAROXYSMAL SWEATS: NO SWEAT VISIBLE
PAROXYSMAL SWEATS: BARELY PERCEPTIBLE SWEATING. PALMS MOIST
TOTAL SCORE: 1
TOTAL SCORE: 7
AUDITORY DISTURBANCES: NOT PRESENT
ORIENTATION AND CLOUDING OF SENSORIUM: ORIENTED AND CAN DO SERIAL ADDITIONS
PAROXYSMAL SWEATS: NO SWEAT VISIBLE
TREMOR: NO TREMOR
ORIENTATION AND CLOUDING OF SENSORIUM: DISORIENTED FOR PLACE AND / OR PERSON
ANXIETY: MILDLY ANXIOUS
ANXIETY: NO ANXIETY (AT EASE)
HEADACHE, FULLNESS IN HEAD: NOT PRESENT
NAUSEA AND VOMITING: NO NAUSEA AND NO VOMITING
ORIENTATION AND CLOUDING OF SENSORIUM: DISORIENTED FOR PLACE AND / OR PERSON
AUDITORY DISTURBANCES: NOT PRESENT
PAROXYSMAL SWEATS: BARELY PERCEPTIBLE SWEATING. PALMS MOIST
ANXIETY: NO ANXIETY (AT EASE)
TREMOR: NO TREMOR
AGITATION: SOMEWHAT MORE THAN NORMAL ACTIVITY
PAROXYSMAL SWEATS: BARELY PERCEPTIBLE SWEATING. PALMS MOIST
HEADACHE, FULLNESS IN HEAD: NOT PRESENT
ORIENTATION AND CLOUDING OF SENSORIUM: ORIENTED AND CAN DO SERIAL ADDITIONS
AUDITORY DISTURBANCES: NOT PRESENT
HEADACHE, FULLNESS IN HEAD: NOT PRESENT
ANXIETY: MILDLY ANXIOUS
TOTAL SCORE: 4
ORIENTATION AND CLOUDING OF SENSORIUM: DISORIENTED FOR PLACE AND / OR PERSON
TOTAL SCORE: 4
NAUSEA AND VOMITING: NO NAUSEA AND NO VOMITING
VISUAL DISTURBANCES: NOT PRESENT
HEADACHE, FULLNESS IN HEAD: NOT PRESENT
TREMOR: TREMOR NOT VISIBLE BUT CAN BE FELT, FINGERTIP TO FINGERTIP
TACTILE DISTURBANCES: MODERATE ITCHING, PINS AND NEEDLES SENSATION, BURNING OR NUMBNESS
ON A TYPICAL DAY WHEN YOU DRINK ALCOHOL HOW MANY DRINKS DO YOU HAVE: 10
ANXIETY: MILDLY ANXIOUS
ORIENTATION AND CLOUDING OF SENSORIUM: ORIENTED AND CAN DO SERIAL ADDITIONS
PAROXYSMAL SWEATS: *
VISUAL DISTURBANCES: NOT PRESENT
AUDITORY DISTURBANCES: NOT PRESENT
TREMOR: MODERATE TREMOR WITH ARMS EXTENDED
TACTILE DISTURBANCES: MILD ITCHING, PINS AND NEEDLES SENSATION, BURNING OR NUMBNESS
ANXIETY: MILDLY ANXIOUS
ANXIETY: NO ANXIETY (AT EASE)
TOTAL SCORE: 0
TOTAL SCORE: 6
AGITATION: SOMEWHAT MORE THAN NORMAL ACTIVITY
VISUAL DISTURBANCES: NOT PRESENT
TACTILE DISTURBANCES: VERY MILD ITCHING, PINS AND NEEDLES SENSATION, BURNING OR NUMBNESS
ORIENTATION AND CLOUDING OF SENSORIUM: ORIENTED AND CAN DO SERIAL ADDITIONS
TOTAL SCORE: MILD ITCHING, PINS AND NEEDLES SENSATION, BURNING OR NUMBNESS
AUDITORY DISTURBANCES: NOT PRESENT
TOTAL SCORE: 3
TREMOR: TREMOR NOT VISIBLE BUT CAN BE FELT, FINGERTIP TO FINGERTIP
NAUSEA AND VOMITING: NO NAUSEA AND NO VOMITING
AUDITORY DISTURBANCES: NOT PRESENT
TOTAL SCORE: 26
HEADACHE, FULLNESS IN HEAD: VERY MILD
ALCOHOL_USE: YES
AVERAGE NUMBER OF DAYS PER WEEK YOU HAVE A DRINK CONTAINING ALCOHOL: 7
VISUAL DISTURBANCES: NOT PRESENT
HEADACHE, FULLNESS IN HEAD: NOT PRESENT
TACTILE DISTURBANCES: VERY MILD ITCHING, PINS AND NEEDLES SENSATION, BURNING OR NUMBNESS
EVER FELT BAD OR GUILTY ABOUT YOUR DRINKING: YES
AGITATION: NORMAL ACTIVITY
ANXIETY: *
ANXIETY: MILDLY ANXIOUS
NAUSEA AND VOMITING: NO NAUSEA AND NO VOMITING
TOTAL SCORE: 4
DOES PATIENT WANT TO TALK TO SOMEONE ABOUT QUITTING: NO
ORIENTATION AND CLOUDING OF SENSORIUM: ORIENTED AND CAN DO SERIAL ADDITIONS
HAVE YOU EVER FELT YOU SHOULD CUT DOWN ON YOUR DRINKING: YES
AUDITORY DISTURBANCES: NOT PRESENT
TACTILE DISTURBANCES: VERY MILD ITCHING, PINS AND NEEDLES SENSATION, BURNING OR NUMBNESS
PAROXYSMAL SWEATS: NO SWEAT VISIBLE
NAUSEA AND VOMITING: NO NAUSEA AND NO VOMITING
PAROXYSMAL SWEATS: NO SWEAT VISIBLE
AGITATION: NORMAL ACTIVITY
ANXIETY: MILDLY ANXIOUS
VISUAL DISTURBANCES: NOT PRESENT
TREMOR: NO TREMOR
ORIENTATION AND CLOUDING OF SENSORIUM: DISORIENTED FOR PLACE AND / OR PERSON
AGITATION: NORMAL ACTIVITY
ANXIETY: MILDLY ANXIOUS
PAROXYSMAL SWEATS: BARELY PERCEPTIBLE SWEATING. PALMS MOIST
VISUAL DISTURBANCES: NOT PRESENT
HEADACHE, FULLNESS IN HEAD: NOT PRESENT
AUDITORY DISTURBANCES: NOT PRESENT
PAROXYSMAL SWEATS: NO SWEAT VISIBLE
ANXIETY: NO ANXIETY (AT EASE)
ORIENTATION AND CLOUDING OF SENSORIUM: DISORIENTED FOR PLACE AND / OR PERSON
AUDITORY DISTURBANCES: NOT PRESENT
AGITATION: NORMAL ACTIVITY
TREMOR: NO TREMOR
HEADACHE, FULLNESS IN HEAD: VERY MILD
AGITATION: NORMAL ACTIVITY
AGITATION: NORMAL ACTIVITY
VISUAL DISTURBANCES: NOT PRESENT
TOTAL SCORE: 2
AUDITORY DISTURBANCES: NOT PRESENT
TOTAL SCORE: 26
AGITATION: MODERATELY FIDGETY AND RESTLESS
ORIENTATION AND CLOUDING OF SENSORIUM: DISORIENTED FOR PLACE AND / OR PERSON
TREMOR: TREMOR NOT VISIBLE BUT CAN BE FELT, FINGERTIP TO FINGERTIP
AUDITORY DISTURBANCES: NOT PRESENT
VISUAL DISTURBANCES: NOT PRESENT
TOTAL SCORE: 6
PAROXYSMAL SWEATS: BARELY PERCEPTIBLE SWEATING. PALMS MOIST
NAUSEA AND VOMITING: NO NAUSEA AND NO VOMITING
TREMOR: NO TREMOR
AGITATION: *
TOTAL SCORE: 4
ORIENTATION AND CLOUDING OF SENSORIUM: ORIENTED AND CAN DO SERIAL ADDITIONS
VISUAL DISTURBANCES: MODERATELY SEVERE HALLUCINATIONS
TOTAL SCORE: VERY MILD ITCHING, PINS AND NEEDLES SENSATION, BURNING OR NUMBNESS
TREMOR: TREMOR NOT VISIBLE BUT CAN BE FELT, FINGERTIP TO FINGERTIP
HEADACHE, FULLNESS IN HEAD: NOT PRESENT
NAUSEA AND VOMITING: NO NAUSEA AND NO VOMITING
ANXIETY: NO ANXIETY (AT EASE)
ORIENTATION AND CLOUDING OF SENSORIUM: ORIENTED AND CAN DO SERIAL ADDITIONS
AUDITORY DISTURBANCES: NOT PRESENT
TOTAL SCORE: 5
TOTAL SCORE: 3
AUDITORY DISTURBANCES: NOT PRESENT
ORIENTATION AND CLOUDING OF SENSORIUM: ORIENTED AND CAN DO SERIAL ADDITIONS
TOTAL SCORE: 8
TACTILE DISTURBANCES: VERY MILD ITCHING, PINS AND NEEDLES SENSATION, BURNING OR NUMBNESS
NAUSEA AND VOMITING: NO NAUSEA AND NO VOMITING
ANXIETY: NO ANXIETY (AT EASE)
AUDITORY DISTURBANCES: NOT PRESENT
VISUAL DISTURBANCES: NOT PRESENT
TOTAL SCORE: 4
TOTAL SCORE: 1
AUDITORY DISTURBANCES: NOT PRESENT
AUDITORY DISTURBANCES: NOT PRESENT
NAUSEA AND VOMITING: NO NAUSEA AND NO VOMITING
TREMOR: TREMOR NOT VISIBLE BUT CAN BE FELT, FINGERTIP TO FINGERTIP
HEADACHE, FULLNESS IN HEAD: NOT PRESENT
EVER FELT BAD OR GUILTY ABOUT YOUR DRINKING: YES
ANXIETY: MILDLY ANXIOUS
ORIENTATION AND CLOUDING OF SENSORIUM: CANNOT DO SERIAL ADDITIONS OR IS UNCERTAIN ABOUT DATE
ANXIETY: NO ANXIETY (AT EASE)
ANXIETY: MILDLY ANXIOUS
TOTAL SCORE: 13
AUDITORY DISTURBANCES: NOT PRESENT
ORIENTATION AND CLOUDING OF SENSORIUM: ORIENTED AND CAN DO SERIAL ADDITIONS
ORIENTATION AND CLOUDING OF SENSORIUM: DISORIENTED FOR PLACE AND / OR PERSON
TREMOR: TREMOR NOT VISIBLE BUT CAN BE FELT, FINGERTIP TO FINGERTIP
HEADACHE, FULLNESS IN HEAD: NOT PRESENT
NAUSEA AND VOMITING: NO NAUSEA AND NO VOMITING
ANXIETY: NO ANXIETY (AT EASE)
PAROXYSMAL SWEATS: NO SWEAT VISIBLE
AGITATION: NORMAL ACTIVITY
ORIENTATION AND CLOUDING OF SENSORIUM: DISORIENTED FOR PLACE AND / OR PERSON
HEADACHE, FULLNESS IN HEAD: NOT PRESENT
AUDITORY DISTURBANCES: NOT PRESENT
PAROXYSMAL SWEATS: BARELY PERCEPTIBLE SWEATING. PALMS MOIST
AGITATION: NORMAL ACTIVITY
TACTILE DISTURBANCES: VERY MILD ITCHING, PINS AND NEEDLES SENSATION, BURNING OR NUMBNESS
AGITATION: NORMAL ACTIVITY
HEADACHE, FULLNESS IN HEAD: NOT PRESENT
HEADACHE, FULLNESS IN HEAD: NOT PRESENT
PAROXYSMAL SWEATS: BARELY PERCEPTIBLE SWEATING. PALMS MOIST
AUDITORY DISTURBANCES: NOT PRESENT
DOES PATIENT WANT TO TALK TO SOMEONE ABOUT QUITTING: YES
PAROXYSMAL SWEATS: NO SWEAT VISIBLE
AGITATION: NORMAL ACTIVITY
ANXIETY: *
PAROXYSMAL SWEATS: BARELY PERCEPTIBLE SWEATING. PALMS MOIST
NAUSEA AND VOMITING: NO NAUSEA AND NO VOMITING
NAUSEA AND VOMITING: NO NAUSEA AND NO VOMITING
ANXIETY: NO ANXIETY (AT EASE)
HEADACHE, FULLNESS IN HEAD: NOT PRESENT

## 2018-01-01 ASSESSMENT — ENCOUNTER SYMPTOMS
CHILLS: 0
VOMITING: 0
COUGH: 0
VOMITING: 0
MYALGIAS: 0
ORTHOPNEA: 0
VOMITING: 0
DIZZINESS: 0
SHORTNESS OF BREATH: 0
DIZZINESS: 0
WEIGHT LOSS: 0
HEADACHES: 0
BRUISES/BLEEDS EASILY: 0
FEVER: 0
HEADACHES: 0
ABDOMINAL PAIN: 0
BACK PAIN: 0
FEVER: 0
BLURRED VISION: 0
FEVER: 0
WEAKNESS: 0
BLURRED VISION: 0
PHOTOPHOBIA: 0
NAUSEA: 0
ABDOMINAL PAIN: 0
ABDOMINAL PAIN: 0
BLURRED VISION: 0
SHORTNESS OF BREATH: 0
ABDOMINAL PAIN: 0
TINGLING: 0
HEADACHES: 0
FEVER: 0
COUGH: 0
PALPITATIONS: 0
BACK PAIN: 1
FEVER: 0
BRUISES/BLEEDS EASILY: 1
DIAPHORESIS: 0
NAUSEA: 0
WEAKNESS: 1
SHORTNESS OF BREATH: 0
DIZZINESS: 0
DIZZINESS: 1
DIZZINESS: 0
PND: 0
DIARRHEA: 0
DIZZINESS: 1
FEVER: 0
DIARRHEA: 0
BACK PAIN: 1
BLURRED VISION: 0
NAUSEA: 0
BLURRED VISION: 0
COUGH: 0
PALPITATIONS: 0
CHILLS: 0
NECK PAIN: 0
HEARTBURN: 0
BLOOD IN STOOL: 0
BLOOD IN STOOL: 0
CHILLS: 0
ABDOMINAL PAIN: 0
CHILLS: 0
SPUTUM PRODUCTION: 0
VOMITING: 0
NERVOUS/ANXIOUS: 0
INSOMNIA: 0
DIARRHEA: 0
COUGH: 0
CHILLS: 0
LOSS OF CONSCIOUSNESS: 0
DIARRHEA: 0
NECK PAIN: 0
DIZZINESS: 0
FEVER: 0
NAUSEA: 0
ABDOMINAL PAIN: 0
COUGH: 0
NAUSEA: 0
HEADACHES: 0
HEADACHES: 0
PHOTOPHOBIA: 0
COUGH: 0
CHILLS: 0
SHORTNESS OF BREATH: 0
COUGH: 0
FEVER: 0
HEADACHES: 0
PALPITATIONS: 0
DEPRESSION: 0
CHILLS: 0
FEVER: 0
POLYDIPSIA: 0
DEPRESSION: 0
PALPITATIONS: 0
VOMITING: 0
SHORTNESS OF BREATH: 0
ABDOMINAL PAIN: 1
BLURRED VISION: 0
ABDOMINAL PAIN: 0
SORE THROAT: 0
NAUSEA: 0
DIZZINESS: 0
ABDOMINAL PAIN: 0
DEPRESSION: 0
BLOOD IN STOOL: 0
COUGH: 0
COUGH: 0
PALPITATIONS: 0
NECK PAIN: 0
DIARRHEA: 0
SORE THROAT: 0
DOUBLE VISION: 0
MYALGIAS: 0
DEPRESSION: 0
DIARRHEA: 0
MYALGIAS: 0
COUGH: 0
DEPRESSION: 0
FEVER: 0
ABDOMINAL PAIN: 0
COUGH: 0
ABDOMINAL PAIN: 0
VOMITING: 0
HEADACHES: 0
BLURRED VISION: 0
BACK PAIN: 0
DIARRHEA: 1
VOMITING: 0
ORTHOPNEA: 0
SINUS PAIN: 0
ORTHOPNEA: 0
VOMITING: 0
DIARRHEA: 0
BLOOD IN STOOL: 0
FEVER: 0
NERVOUS/ANXIOUS: 0
FOCAL WEAKNESS: 0
SHORTNESS OF BREATH: 0
BLOOD IN STOOL: 0
WHEEZING: 0
NERVOUS/ANXIOUS: 1
NAUSEA: 0
ORTHOPNEA: 0
SHORTNESS OF BREATH: 0
CHILLS: 0
BLURRED VISION: 0
DIZZINESS: 0
MYALGIAS: 0
FEVER: 0
BLOOD IN STOOL: 0
NAUSEA: 0
HEARTBURN: 0
COUGH: 0
DIAPHORESIS: 0
MYALGIAS: 0
DIZZINESS: 0
HEADACHES: 0
MYALGIAS: 0
HEMOPTYSIS: 0
MYALGIAS: 0
HALLUCINATIONS: 0
VOMITING: 0
HEADACHES: 0
INSOMNIA: 0
VOMITING: 0
DEPRESSION: 0
MYALGIAS: 0
PALPITATIONS: 0
CONSTIPATION: 0
BLOOD IN STOOL: 0
PALPITATIONS: 0
PALPITATIONS: 0
DEPRESSION: 0
DIARRHEA: 0
HEADACHES: 0
NERVOUS/ANXIOUS: 0
ABDOMINAL PAIN: 0
DIZZINESS: 0
CHILLS: 0
DIZZINESS: 1
ABDOMINAL PAIN: 0
DEPRESSION: 0
SHORTNESS OF BREATH: 0
SORE THROAT: 0
COUGH: 0
HEADACHES: 0
VOMITING: 0
SHORTNESS OF BREATH: 0
VOMITING: 0
BLOOD IN STOOL: 0
FEVER: 0
CHILLS: 0
INSOMNIA: 0
DIZZINESS: 0
NAUSEA: 0
FEVER: 0
DIARRHEA: 0
FLANK PAIN: 0
TINGLING: 0
FALLS: 0
SHORTNESS OF BREATH: 0
NAUSEA: 0
NAUSEA: 1
BACK PAIN: 0
BLURRED VISION: 0
SEIZURES: 0
DEPRESSION: 0
CHILLS: 0
PALPITATIONS: 0
BLURRED VISION: 0
BRUISES/BLEEDS EASILY: 0
ABDOMINAL PAIN: 0
DEPRESSION: 0
MYALGIAS: 0
BACK PAIN: 0
FOCAL WEAKNESS: 0
BRUISES/BLEEDS EASILY: 0
FEVER: 0
INSOMNIA: 0
VOMITING: 0
COUGH: 0
DEPRESSION: 0
MYALGIAS: 0
ABDOMINAL PAIN: 0
SORE THROAT: 0
NAUSEA: 0
HEADACHES: 0
BLURRED VISION: 0
SHORTNESS OF BREATH: 0
CHILLS: 0
COUGH: 0
SHORTNESS OF BREATH: 0
BACK PAIN: 0
BLOOD IN STOOL: 0
DIZZINESS: 1
WHEEZING: 0
PALPITATIONS: 0
HEMOPTYSIS: 0
NAUSEA: 0
TREMORS: 0
PALPITATIONS: 0
NAUSEA: 0
PALPITATIONS: 0
SORE THROAT: 0
CHILLS: 0
MYALGIAS: 0
HEADACHES: 0
SHORTNESS OF BREATH: 0
HEADACHES: 0

## 2018-01-01 ASSESSMENT — PAIN SCALES - GENERAL
PAINLEVEL_OUTOF10: 0
PAINLEVEL_OUTOF10: 4
PAINLEVEL_OUTOF10: 0
PAINLEVEL_OUTOF10: 3
PAINLEVEL_OUTOF10: 0
PAINLEVEL_OUTOF10: 4
PAINLEVEL_OUTOF10: 0
PAINLEVEL_OUTOF10: 0
PAINLEVEL_OUTOF10: 5
PAINLEVEL_OUTOF10: 5
PAINLEVEL_OUTOF10: 0
PAINLEVEL_OUTOF10: 4
PAINLEVEL_OUTOF10: 9
PAINLEVEL_OUTOF10: 0
PAINLEVEL_OUTOF10: 5
PAINLEVEL_OUTOF10: 0
PAINLEVEL_OUTOF10: 5
PAINLEVEL_OUTOF10: 0
PAINLEVEL_OUTOF10: 3
PAINLEVEL_OUTOF10: 0
PAINLEVEL_OUTOF10: 0
PAINLEVEL_OUTOF10: 6
PAINLEVEL_OUTOF10: 0
PAINLEVEL_OUTOF10: 5
PAINLEVEL_OUTOF10: 0

## 2018-01-01 ASSESSMENT — PATIENT HEALTH QUESTIONNAIRE - PHQ9
2. FEELING DOWN, DEPRESSED, IRRITABLE, OR HOPELESS: SEVERAL DAYS
SUM OF ALL RESPONSES TO PHQ9 QUESTIONS 1 AND 2: 0
2. FEELING DOWN, DEPRESSED, IRRITABLE, OR HOPELESS: NOT AT ALL
9. THOUGHTS THAT YOU WOULD BE BETTER OFF DEAD, OR OF HURTING YOURSELF: SEVERAL DAYS
5. POOR APPETITE OR OVEREATING: SEVERAL DAYS
7. TROUBLE CONCENTRATING ON THINGS, SUCH AS READING THE NEWSPAPER OR WATCHING TELEVISION: SEVERAL DAYS
1. LITTLE INTEREST OR PLEASURE IN DOING THINGS: SEVERAL DAYS
SUM OF ALL RESPONSES TO PHQ9 QUESTIONS 1 AND 2: 0
3. TROUBLE FALLING OR STAYING ASLEEP OR SLEEPING TOO MUCH: SEVERAL DAYS
1. LITTLE INTEREST OR PLEASURE IN DOING THINGS: NOT AT ALL
2. FEELING DOWN, DEPRESSED, IRRITABLE, OR HOPELESS: NOT AT ALL
SUM OF ALL RESPONSES TO PHQ9 QUESTIONS 1 AND 2: 0
SUM OF ALL RESPONSES TO PHQ9 QUESTIONS 1 AND 2: 0
1. LITTLE INTEREST OR PLEASURE IN DOING THINGS: NOT AT ALL
SUM OF ALL RESPONSES TO PHQ QUESTIONS 1-9: 0
1. LITTLE INTEREST OR PLEASURE IN DOING THINGS: NOT AT ALL
8. MOVING OR SPEAKING SO SLOWLY THAT OTHER PEOPLE COULD HAVE NOTICED. OR THE OPPOSITE, BEING SO FIGETY OR RESTLESS THAT YOU HAVE BEEN MOVING AROUND A LOT MORE THAN USUAL: NOT AT ALL
6. FEELING BAD ABOUT YOURSELF - OR THAT YOU ARE A FAILURE OR HAVE LET YOURSELF OR YOUR FAMILY DOWN: SEVERAL DAYS
SUM OF ALL RESPONSES TO PHQ QUESTIONS 1-9: 8
2. FEELING DOWN, DEPRESSED, IRRITABLE, OR HOPELESS: NOT AT ALL
SUM OF ALL RESPONSES TO PHQ9 QUESTIONS 1 AND 2: 2
1. LITTLE INTEREST OR PLEASURE IN DOING THINGS: NOT AT ALL
2. FEELING DOWN, DEPRESSED, IRRITABLE, OR HOPELESS: NOT AT ALL
4. FEELING TIRED OR HAVING LITTLE ENERGY: SEVERAL DAYS

## 2018-01-01 ASSESSMENT — COGNITIVE AND FUNCTIONAL STATUS - GENERAL
MOVING FROM LYING ON BACK TO SITTING ON SIDE OF FLAT BED: A LOT
DRESSING REGULAR UPPER BODY CLOTHING: A LITTLE
SUGGESTED CMS G CODE MODIFIER DAILY ACTIVITY: CK
SUGGESTED CMS G CODE MODIFIER DAILY ACTIVITY: CI
DAILY ACTIVITIY SCORE: 15
CLIMB 3 TO 5 STEPS WITH RAILING: A LOT
DRESSING REGULAR LOWER BODY CLOTHING: A LOT
PERSONAL GROOMING: A LOT
HELP NEEDED FOR BATHING: A LITTLE
TURNING FROM BACK TO SIDE WHILE IN FLAT BAD: A LOT
TOILETING: A LOT
CLIMB 3 TO 5 STEPS WITH RAILING: A LITTLE
SUGGESTED CMS G CODE MODIFIER MOBILITY: CI
MOBILITY SCORE: 23
STANDING UP FROM CHAIR USING ARMS: A LITTLE
DAILY ACTIVITIY SCORE: 23
SUGGESTED CMS G CODE MODIFIER MOBILITY: CL
MOBILITY SCORE: 14
WALKING IN HOSPITAL ROOM: A LITTLE
MOVING TO AND FROM BED TO CHAIR: A LOT
HELP NEEDED FOR BATHING: A LOT

## 2018-01-01 ASSESSMENT — COPD QUESTIONNAIRES
COPD SCREENING SCORE: 1
DO YOU EVER COUGH UP ANY MUCUS OR PHLEGM?: NO/ONLY WITH OCCASIONAL COLDS OR INFECTIONS
HAVE YOU SMOKED AT LEAST 100 CIGARETTES IN YOUR ENTIRE LIFE: NO/DON'T KNOW
DURING THE PAST 4 WEEKS HOW MUCH DID YOU FEEL SHORT OF BREATH: SOME OF THE TIME
IN THE PAST 12 MONTHS DO YOU DO LESS THAN YOU USED TO BECAUSE OF YOUR BREATHING PROBLEMS: DISAGREE/UNSURE

## 2018-03-20 PROBLEM — I95.9 HYPOTENSION: Status: ACTIVE | Noted: 2018-01-01

## 2018-03-20 PROBLEM — R17 JAUNDICE: Status: ACTIVE | Noted: 2018-01-01

## 2018-03-20 PROBLEM — E87.1 HYPONATREMIA: Status: ACTIVE | Noted: 2018-01-01

## 2018-03-20 NOTE — ED TRIAGE NOTES
"Chief Complaint   Patient presents with   • Hypotension     pt was at [facility] and was sent over here \"because of my vitals\" pt states she does not feel lightheaded, but says she feels weak   • Other     \"they also are concerned for my liver\"       "

## 2018-03-20 NOTE — ED TRIAGE NOTES
Pt amb to room w/ slow steady gait, states last etoh yesterday am, was sent to er from [facility] for jaundice and hypotension; pt states hx of jaundice and cirrhosis, sees Dr Hernandez

## 2018-03-20 NOTE — ED NOTES
Med rec complete per patient and [facility]  Allergies reviewed  No PO antibiotics in last 30 days    [facility] ON[facility]

## 2018-03-20 NOTE — ASSESSMENT & PLAN NOTE
Alcoholic hepatitis  Maddrey score 13 on admit- steroids unlikely to benefit ( 20.9 2/2 increased PT)  3/26 25.1  Meld score 27 (~ 50%  3mo mortality)> 19> 23  bili higher- unclear why- cont steroids slowly trending down   monitor

## 2018-03-20 NOTE — DISCHARGE PLANNING
Medical Social Work    SW notified Pt is from [facility].    SW called and spoke to Ro at [facility] and she stated they can hold Pt bed for 24 hours, if she is here longer than 24 hours to call them at [phone] and they will bring her belongings over to hospital.    DONALD updated floor DONALD [place] and advised of Pt situation.    No other needs at this time.

## 2018-03-20 NOTE — H&P
" Hospital Medicine History and Physical    Date of Service  3/20/2018    Chief Complaint  Chief Complaint   Patient presents with   • Hypotension     pt was at [facility] and was sent over here \"because of my vitals\" pt states she does not feel lightheaded, but says she feels weak   • Other     \"they also are concerned for my liver\"       History of Presenting Illness     52 y.o. female who presented 3/20/2018 with a history of alcoholic liver disease. Patient is followed by gastroenterology, she's never had a EGD, she is prescribed diuretics for abdominal swelling. She is sporadically compliant with these restarted taking them a few days ago. Patient continues to drink alcohol, she went to [facility] today to enroll in alcohol withdrawal program. Her vital signs were taken and she was found to be hypotensive, she was sent to the ER for evaluation.  Patient endorses feeling of generalized weakness, she is not dizzy or lightheaded, she has not had any syncopal episodes.  Her abdomen is relatively less distended than normal for her. She's been eating and drinking normally prior to evaluation.  No nausea vomiting, no hematemesis, no black tarry stool or bright red blood per rectum.  Last drink was approximately 24 hours prior to admission.  No hallucinations, no seizures, Patient endorses progressive yellowing of the eyes over the past few days.      Primary Care Physician  Bety Flores M.D.    Consultants  None    Code Status  Full Code    Review of Systems  Review of Systems   Constitutional: Negative for diaphoresis and fever.   Respiratory: Negative for cough and hemoptysis.    Cardiovascular: Negative for chest pain and palpitations.   Gastrointestinal: Negative for abdominal pain, blood in stool, melena, nausea and vomiting.   Musculoskeletal: Negative for back pain and neck pain.   Skin: Negative for itching and rash.   Neurological: Positive for weakness.   All other systems reviewed and are negative.       Past " "Medical History  Past Medical History:   Diagnosis Date   • Cirrhosis (CMS-HCC)    • ETOH abuse        Surgical History  No past surgical history on file.    Medications  No current facility-administered medications on file prior to encounter.      No current outpatient prescriptions on file prior to encounter.       Family History  History reviewed. No pertinent family history.    Social History  Social History   Substance Use Topics   • Smoking status: Never Smoker   • Smokeless tobacco: Never Used   • Alcohol use Yes      Comment: 1/5 whiskey a day       Allergies  Allergies   Allergen Reactions   • Pcn [Penicillins] Rash      \"patient stated she can tolerate Amoxicillin\"         Physical Exam  Laboratory   Hemodynamics  Temp (24hrs), Av.2 °C (97.1 °F), Min:36.2 °C (97.1 °F), Max:36.2 °C (97.1 °F)   Temperature: 36.2 °C (97.1 °F)  Pulse  Av.5  Min: 86  Max: 94 Heart Rate (Monitored): 86  Blood Pressure: (!) 86/59 (second pressure 80/49, third pressure other arm 78/54), NIBP: (!) 85/54      Respiratory      Respiration: 20, Pulse Oximetry: 96 %             Physical Exam   Constitutional: She is oriented to person, place, and time. She appears well-developed and well-nourished.   HENT:   Head: Normocephalic and atraumatic.   Eyes: Conjunctivae and EOM are normal. Right eye exhibits no discharge. Left eye exhibits no discharge. Scleral icterus is present.   Neck: Normal range of motion. Neck supple. No tracheal deviation present. No thyromegaly present.   Cardiovascular: Normal rate, regular rhythm and intact distal pulses.    No murmur heard.  Pulmonary/Chest: Effort normal and breath sounds normal. No respiratory distress. She has no wheezes.   Abdominal: Soft. Bowel sounds are normal. She exhibits no distension. There is no tenderness. There is no rebound.   Abdomen is slightly distended  No fluid wave   Musculoskeletal: Normal range of motion. She exhibits no edema.   Neurological: She is alert and " oriented to person, place, and time. No cranial nerve deficit.   Skin: Skin is warm and dry. She is not diaphoretic. No erythema.   Psychiatric: She has a normal mood and affect.       Recent Labs      03/20/18   1039   WBC  5.9   RBC  3.63*   HEMOGLOBIN  12.9   HEMATOCRIT  35.6*   MCV  98.1*   MCH  35.5*   MCHC  36.2*   RDW  50.3*   PLATELETCT  76*   MPV  12.9     Recent Labs      03/20/18   1039   SODIUM  126*   POTASSIUM  4.1   CHLORIDE  78*   CO2  31   GLUCOSE  100*   BUN  31*   CREATININE  1.09   CALCIUM  9.3     Recent Labs      03/20/18   1039   ALTSGPT  38   ASTSGOT  113*   ALKPHOSPHAT  233*   TBILIRUBIN  11.5*   GLUCOSE  100*     Recent Labs      03/20/18   1039   APTT  33.9   INR  1.21*             No results found for: TROPONINI       Assessment/Plan     I anticipate this patient is appropriate for observation status at this time.    * Hypotension- (present on admission)   Assessment & Plan    Likely intravascular volume depletion due to liver disease  Admit to observation status  Gentle IVF  Hold diuretics and propranolol  Start midodrine        Jaundice- (present on admission)   Assessment & Plan    Probable alcoholic hepatitis  Check hepatitis panel  Check right upper quadrant ultrasound        Hyponatremia- (present on admission)   Assessment & Plan    Hypovolemic hyponatremia  Will hold diuretics and assess her response to fluids        ETOH abuse- (present on admission)   Assessment & Plan    Last request 24 hours prior to admission  Has history of severe alcohol withdrawal  [facility] protocol  Social work consult, perhaps patient can discharge to alcohol rehab tomorrow        Alcoholism (CMS-HCC)- (present on admission)   Assessment & Plan    Cessation recommended            VTE prophylaxis: lovenox

## 2018-03-20 NOTE — ASSESSMENT & PLAN NOTE
Continues  Fluids not impacting blood pressure-may be becoming volume overloaded-discontinued  Echo :Normal transthoracic echocardiogram  OT to eval

## 2018-03-20 NOTE — ED PROVIDER NOTES
"ED Provider Note    Scribed for Tre Tejeda M.D. by Cristiano Zurita. 3/20/2018  10:33 AM    Primary care provider: Bety Flores M.D.  Means of arrival: Walk in  History obtained from: Patient  History limited by: None    CHIEF COMPLAINT  Chief Complaint   Patient presents with   • Hypotension     pt was at [facility] and was sent over here \"because of my vitals\" pt states she does not feel lightheaded, but says she feels weak   • Other     \"they also are concerned for my liver\"       HPI  Felicia Machado is a 52 y.o. female who presents to the Emergency Department after being sent from [facility] today. Patient states staff at [facility] sent her here due to concerning vital signs, particularly hypotension, and jaundice noticed today. Patient reports having associated malaise, generalized weakness, nausea, and bilateral flank pain. She does not report any alleviating factors to the flank pain. She denies current abdominal pain but notes having abdominal pain last week. She does not report any recent fevers or chills. She was checked in at [facility] yesterday to try and get off of alcohol. Her last alcoholic drink was yesterday morning. Patient has a history of cirrhosis.       REVIEW OF SYSTEMS  Pertinent positives include malaise, generalized weakness, nausea, bilateral flank pain, jaundice skin.   Pertinent negatives include no current abdominal pain, recent fevers, chills.    All other systems reviewed and negative.    C    PAST MEDICAL HISTORY   has a past medical history of Cirrhosis (CMS-HCC) and ETOH abuse.    SURGICAL HISTORY  patient denies any surgical history    SOCIAL HISTORY  Social History   Substance Use Topics   • Smoking status: Never Smoker   • Smokeless tobacco: Never Used   • Alcohol use Yes      Comment: 1/5 whiskey a day      History   Drug Use No       FAMILY HISTORY  History reviewed. No pertinent family history.    CURRENT MEDICATIONS  Reviewed.  See Encounter Summary. " "    ALLERGIES  Allergies   Allergen Reactions   • Pcn [Penicillins] Rash             PHYSICAL EXAM  VITAL SIGNS: BP (!) 86/59 Comment: second pressure 80/49, third pressure other arm 78/54  Pulse 94   Temp 36.2 °C (97.1 °F)   Resp 14   Ht 1.626 m (5' 4\") Comment: Simultaneous filing. User may not have seen previous data.  Wt 54.4 kg (120 lb)   SpO2 99%   BMI 20.60 kg/m²   Nursing note and vitals reviewed.  Constitutional: Well-developed and well-nourished. No distress. Chronically ill appearing. Stigmata of end stage liver disease.   HENT: Head is normocephalic and atraumatic. Oropharynx is clear and moist without exudate or erythema.   Eyes: Pupils are equal, round, and reactive to light. Scleral icterus.  Cardiovascular: Normal rate and regular rhythm. No murmur heard. Normal radial pulses.  Pulmonary/Chest: Breath sounds normal. No wheezes or rales.   Abdominal: Soft and non-tender. No distention    Musculoskeletal: Extremities exhibit normal range of motion without edema or tenderness.   Neurological: Awake, alert and oriented to person, place, and time. No focal deficits noted.  Skin: Jaundice skin. Skin is warm and dry. No rash. Spider angioma.  Psychiatric: Normal mood and affect. Appropriate for clinical situation      DIAGNOSTIC STUDIES / PROCEDURES    LABS  Results for orders placed or performed during the hospital encounter of 03/20/18   CBC WITH DIFFERENTIAL   Result Value Ref Range    WBC 5.9 4.8 - 10.8 K/uL    RBC 3.63 (L) 4.20 - 5.40 M/uL    Hemoglobin 12.9 12.0 - 16.0 g/dL    Hematocrit 35.6 (L) 37.0 - 47.0 %    MCV 98.1 (H) 81.4 - 97.8 fL    MCH 35.5 (H) 27.0 - 33.0 pg    MCHC 36.2 (H) 33.6 - 35.0 g/dL    RDW 50.3 (H) 35.9 - 50.0 fL    Platelet Count 76 (L) 164 - 446 K/uL    MPV 12.9 9.0 - 12.9 fL    Neutrophils-Polys 80.10 (H) 44.00 - 72.00 %    Lymphocytes 7.50 (L) 22.00 - 41.00 %    Monocytes 10.40 0.00 - 13.40 %    Eosinophils 1.00 0.00 - 6.90 %    Basophils 0.70 0.00 - 1.80 %    Immature " Granulocytes 0.30 0.00 - 0.90 %    Nucleated RBC 0.00 /100 WBC    Neutrophils (Absolute) 4.70 2.00 - 7.15 K/uL    Lymphs (Absolute) 0.44 (L) 1.00 - 4.80 K/uL    Monos (Absolute) 0.61 0.00 - 0.85 K/uL    Eos (Absolute) 0.06 0.00 - 0.51 K/uL    Baso (Absolute) 0.04 0.00 - 0.12 K/uL    Immature Granulocytes (abs) 0.02 0.00 - 0.11 K/uL    NRBC (Absolute) 0.00 K/uL   COMP METABOLIC PANEL   Result Value Ref Range    Sodium 126 (L) 135 - 145 mmol/L    Potassium 4.1 3.6 - 5.5 mmol/L    Chloride 78 (L) 96 - 112 mmol/L    Co2 31 20 - 33 mmol/L    Anion Gap 17.0 (H) 0.0 - 11.9    Glucose 100 (H) 65 - 99 mg/dL    Bun 31 (H) 8 - 22 mg/dL    Creatinine 1.09 0.50 - 1.40 mg/dL    Calcium 9.3 8.5 - 10.5 mg/dL    AST(SGOT) 113 (H) 12 - 45 U/L    ALT(SGPT) 38 2 - 50 U/L    Alkaline Phosphatase 233 (H) 30 - 99 U/L    Total Bilirubin 11.5 (H) 0.1 - 1.5 mg/dL    Albumin 4.1 3.2 - 4.9 g/dL    Total Protein 8.1 6.0 - 8.2 g/dL    Globulin 4.0 (H) 1.9 - 3.5 g/dL    A-G Ratio 1.0 g/dL   PROTHROMBIN TIME (INR)   Result Value Ref Range    PT 15.0 (H) 12.0 - 14.6 sec    INR 1.21 (H) 0.87 - 1.13   APTT   Result Value Ref Range    APTT 33.9 24.7 - 36.0 sec   ESTIMATED GFR   Result Value Ref Range    GFR If African American >60 >60 mL/min/1.73 m 2    GFR If Non  53 (A) >60 mL/min/1.73 m 2       All labs reviewed by me.    RADIOLOGY  No orders to display     The radiologist's interpretation of all radiological studies have been reviewed by me.    COURSE & MEDICAL DECISION MAKING  Nursing notes, VS, PMSFHx reviewed in chart.     Review of past medical records shows the patient was admitted in November 2017 for a GI bleed.      10:33 AM - Patient seen and examined at bedside. I suspect hypotension secondary to volume depletion and liver disease. Patient will be treated with zofran 4 mg, er detox iv 1000 ml infusion. Ordered CBC, CMP, urinalysis culture, INR, APTT to evaluate her symptoms.     Laboratory findings are obtained. Findings  are consistent with end-stage liver disease and cirrhosis. The patient will be admitted to the hospital. Blood pressure is improved after some volume resuscitation. She requires admission for further evaluation and treatment.    I spoke with the on-call hospitalist Dr. Lobato for admission.    FINAL IMPRESSION  1. End stage liver disease (CMS-HCC)    2. Hypotension, unspecified hypotension type    3. Hypovolemia    4. Alcoholism (CMS-HCC)    5. Alcoholic cirrhosis of liver without ascites (CMS-HCC)          ICristiano (Scribe), am scribing for, and in the presence of, Tre Tejeda M.D..    Electronically signed by: Cristiano Zurita (Amos), 3/20/2018    ITre M.D. personally performed the services described in this documentation, as scribed by Cristiano Zurita in my presence, and it is both accurate and complete.    The note accurately reflects work and decisions made by me.  Tre Tejeda  3/20/2018  11:46 AM

## 2018-03-20 NOTE — ASSESSMENT & PLAN NOTE
Last use 24 hours prior to admission  Has history of severe alcohol withdrawal  she states he remains committed to abstaining

## 2018-03-21 PROBLEM — K92.1 MELENA: Status: ACTIVE | Noted: 2018-01-01

## 2018-03-21 PROBLEM — D69.6 THROMBOCYTOPENIA (HCC): Status: ACTIVE | Noted: 2018-01-01

## 2018-03-21 PROBLEM — K70.9 ALCOHOLIC LIVER DISEASE (HCC): Status: ACTIVE | Noted: 2018-01-01

## 2018-03-21 PROBLEM — K70.30 ALCOHOLIC CIRRHOSIS (HCC): Status: ACTIVE | Noted: 2018-01-01

## 2018-03-21 PROBLEM — D68.9 COAGULOPATHY (HCC): Status: ACTIVE | Noted: 2018-01-01

## 2018-03-21 PROBLEM — N83.8 OVARIAN MASS, RIGHT: Status: ACTIVE | Noted: 2018-01-01

## 2018-03-21 NOTE — PROGRESS NOTES
Report received from ER nurse. Assumed care of pt. No signs of distress at this time. No complaint of pain at this time. Tele box on. Assessment complete. Call light in reach, bed locked in lowest position with 2 upper bed rails up.

## 2018-03-21 NOTE — DIETARY
"Nutrition services:  Day 1 of admit.  Felicia Machado is a 52 y.o. female with admitting DX of hypotension. Pt with poor PO, she used to take supplements at home per admit screen. Pt reports UBW is 136 pounds; she noted a weight loss of 16 pounds one month prior to hospitalization. Pt stated she would feel nauseous and this would lower her appetite during that period of time. However, pt's appetite has significantly increased since most recent admit and pt reports a great appetite currently; PO > % of breakfast noted during bedside visit today. Pt was taking multivitamin supplements at home prior to hospitalization. Added Boost BID and snacks per pt preferences.    Assessment:  Height: 162.6 cm (5' 4\")  Weight: 55.3 kg (121 lb 14.6 oz)  Body mass index is 20.93 kg/m².  Diet/Intake: Regular, PO >50%     Evaluation:   1. 12% weight loss in a one month period of time which is considered severe.   2. Hx of cirrhosis and alcohol abuse - on CIWA protocol; AST levels of 92 and alkaline phosphatase levels of 186 on 3/21/18.   3. Pt on thiamine, multivitamin and folic acid.   4. Sodium levels of 129, potassium levels of 3 on 3/21/18; pt on potassium chloride and NS infusion.   5. Last BM on 3/20/18; pt on miralax, milk of magnesia and dulcolax PRN.     Malnutrition Risk: Pt is at malnutrition risk related to a severe weight loss of 12% in a one month period prior to hospitalization.     Recommendations/Plan:   1. Encourage intake of meals.   2. Document intake of all meals  as % taken in ADL's to provide interdisciplinary communication across all shifts.   3. Monitor weight.  4. Nutrition rep will continue to see patient for ongoing meal and snack preferences.     RD will PRN.             "

## 2018-03-21 NOTE — PROGRESS NOTES
Patient back from CT scan, ciwa negative. Denies pain, no dizziness. No further needs at this time.

## 2018-03-21 NOTE — CARE PLAN
Problem: Knowledge Deficit  Goal: Knowledge of disease process/condition, treatment plan, diagnostic tests, and medications will improve    Intervention: Explain information regarding disease process/condition, treatment plan, diagnostic tests, and medications and document in education  Pt updated on plan of care and why she is on the cardiac telemetry unit. Pt educated on medications and verbalizes understanding. Assessed pt's knowledge of ETOH withdrawal procedures. Pt explained previous experiences with little detail but states that she is already doing better than previous times. Updated pt on plan of care verbally and on white board.

## 2018-03-21 NOTE — CARE PLAN
Problem: Communication  Goal: The ability to communicate needs accurately and effectively will improve    Intervention: Educate patient and significant other/support system about the plan of care, procedures, treatments, medications and allow for questions   03/21/18 0103   OTHER   Pt & Family Have Been Educated on Methods Available to Report Concerns Related to Care, Treatment, Services, and Patient Safety Issues Yes         Problem: Safety  Goal: Will remain free from falls    Intervention: Implement fall precautions   03/21/18 0103   OTHER   Environmental Precautions Treaded Slipper Socks on Patient;Personal Belongings, Wastebasket, Call Bell etc. in Easy Reach;Report Given to Other Health Care Providers Regarding Fall Risk;Bed in Low Position;Mobility Assessed & Appropriate Sign Placed;Communication Sign for Patients & Families   Bedrails Bedrails Closest to Bathroom Down

## 2018-03-21 NOTE — CARE PLAN
Problem: Safety  Goal: Will remain free from falls  Outcome: PROGRESSING AS EXPECTED  Bed alarm on and call light in reach. Pt educated on the importance of using call light before getting out of bed. Pt verbalized understanding.

## 2018-03-21 NOTE — PROGRESS NOTES
Report received. Patient oriented x4. Pain level 0 out of 10. Denies SOB. ciwa negative at this time. Fall risk interventions in place, bed in low position, and bed alarm on. Assessment completed.

## 2018-03-21 NOTE — PROGRESS NOTES
[facility] Hospitalist Progress Note    Date of Service: 3/21/2018    Chief Complaint  52 y.o. Female with PMH alcoholic liver disease admitted 3/20/2018 with desire for detox, hypotension and markedly abnormal LFTs. She says she was urged to stop drinking by her significant other, she went to [facility] and when she was there she was noted to be hypotensive as well as jaundiced, they sent her here for further evaluation. CT of the abdomen was done to evaluate for cirrhosis there was incidental finding of a right ovarian cystic mass, ultrasound was ordered.    Interval Problem Update  CA-125 is elevated and ultrasound confirms a suspicious mass in the right ovary, GYN consultation was recommended and has been requested.   Discussed patient's drinking history she apparently has not abstained from alcohol since her pregnancies which were nearly 30 years ago, but during her pregnancies she was able to avoid drinking. She lives with her significant other who does drink but not as heavily as she doesn't he is very supportive of her quitting and feels she will die if she does not. I discussed patient's prognosis and liver score results-her mortality at 3 months may be as high as 50%. Patient would like to return to [facility] upon discharge and pursue alcohol rehab.  While I was in the room I noticed there was an odor of melena-she says her stools are dark but not black, she's never had any coffee-ground emesis. No history of GI bleeding.    Consultants/Specialty  None    Disposition  Anticipate home        Review of Systems   Constitutional: Negative for chills and fever.   Eyes: Negative for blurred vision.   Respiratory: Negative for cough and shortness of breath.    Cardiovascular: Negative for chest pain and palpitations.   Gastrointestinal: Negative for abdominal pain, blood in stool, melena, nausea and vomiting.   Genitourinary: Negative for dysuria.        Urine dark   Musculoskeletal: Negative for joint pain and  myalgias.   Neurological: Positive for dizziness. Negative for headaches.   Endo/Heme/Allergies: Bruises/bleeds easily (bruise on right shoulder after minor trauma).   Psychiatric/Behavioral: Positive for substance abuse. Negative for depression.      Physical Exam  Laboratory/Imaging   Hemodynamics  Temp (24hrs), Av.7 °C (98 °F), Min:36.2 °C (97.1 °F), Max:37 °C (98.6 °F)   Temperature: 36.7 °C (98 °F)  Pulse  Av.3  Min: 84  Max: 108    Blood Pressure: (!) 83/57      Respiratory      Respiration: 17, Pulse Oximetry: 96 %        RUL Breath Sounds: Clear, RML Breath Sounds: Clear, RLL Breath Sounds: Diminished, MITCH Breath Sounds: Clear, LLL Breath Sounds: Diminished    Fluids    Intake/Output Summary (Last 24 hours) at 18 1637  Last data filed at 18 0700   Gross per 24 hour   Intake             2755 ml   Output             1900 ml   Net              855 ml       Nutrition  Orders Placed This Encounter   Procedures   • Diet Order     Standing Status:   Standing     Number of Occurrences:   1     Order Specific Question:   Diet:     Answer:   Regular [1]     Physical Exam   Constitutional: She is oriented to person, place, and time. She appears well-developed. No distress.   Mildly disheveled and chronically ill-appearing  Does not appear well-nourished    Melanotic odor in the room   HENT:   Head: Normocephalic and atraumatic.   Mouth/Throat: Oropharynx is clear and moist.   Fetid breath   Eyes: Conjunctivae and EOM are normal. Pupils are equal, round, and reactive to light. Right eye exhibits no discharge. Left eye exhibits no discharge. Scleral icterus is present.   Neck: Neck supple.   Cardiovascular: Normal rate and regular rhythm.    Pulmonary/Chest: Effort normal and breath sounds normal.   Abdominal: Soft. Bowel sounds are normal. She exhibits distension (mild fullness). She exhibits no mass. There is no tenderness. There is no rebound and no guarding.   Musculoskeletal: She exhibits no  edema or tenderness.   Neurological: She is alert and oriented to person, place, and time.   No tremor or asterixis   Skin: Skin is warm and dry. She is not diaphoretic. No erythema.   Jaundice  Bruise on right shoulder   Psychiatric: She has a normal mood and affect.   Nursing note and vitals reviewed.      Recent Labs      03/20/18   1039  03/21/18   0321   WBC  5.9  4.7*   RBC  3.63*  3.19*   HEMOGLOBIN  12.9  11.5*   HEMATOCRIT  35.6*  31.6*   MCV  98.1*  99.1*   MCH  35.5*  36.1*   MCHC  36.2*  36.4*   RDW  50.3*  51.0*   PLATELETCT  76*  58*   MPV  12.9  12.7     Recent Labs      03/20/18   1039  03/21/18   0321   SODIUM  126*  129*   POTASSIUM  4.1  3.0*   CHLORIDE  78*  90*   CO2  31  27   GLUCOSE  100*  102*   BUN  31*  18   CREATININE  1.09  0.76   CALCIUM  9.3  9.0     Recent Labs      03/20/18   1039   APTT  33.9   INR  1.21*                  Assessment/Plan     * Hypotension- (present on admission)   Assessment & Plan    Likely intravascular volume depletion due to liver disease  Admit to observation status  Gentle IVF  Hold diuretics and propranolol  Start midodrine        Alcoholic cirrhosis (CMS-HCC)- (present on admission)   Assessment & Plan    Alcoholic hepatitis  Maddrey score 13 on admit- steroids unlikely to benefit  Meld score 27 (~ 50%  3mo mortality)        Hyponatremia- (present on admission)   Assessment & Plan    2/2 liver disease, ? Hypovolemia  Cautious hydration        Melena   Assessment & Plan    Patient denies overtly tarry stools but says her stools are dark  There is a melanotic odor in the room  Will check stools for occult blood  CBC in the morning  If positive may require GI consultation        Ovarian mass, right- (present on admission)   Assessment & Plan    Complex cystic mass R ovary   elevated  GYN /onc consulted        Coagulopathy (CMS-HCC)- (present on admission)   Assessment & Plan    mild        Thrombocytopenia (CMS-HCC)- (present on admission)   Assessment &  Plan    2/2 EtOH and liver dz        Alcoholism (CMS-HCC)- (present on admission)   Assessment & Plan    Last use 24 hours prior to admission  Has history of severe alcohol withdrawal  CIWA protocol        Macrocytic anemia- (present on admission)   Assessment & Plan    Likely 2/2 liver disease and EtOH-  Recent folate borderline low- supplementing PO          Quality-Core Measures   Reviewed items::  Labs reviewed and Medications reviewed  Rhodes catheter::  No Rhodes  DVT prophylaxis pharmacological::  Contraindicated - High bleeding risk  DVT prophylaxis - mechanical:  SCDs  Assessed for rehabilitation services:  Patient returned to prior level of function, rehabilitation not indicated at this time

## 2018-03-21 NOTE — PROGRESS NOTES
2 RN skin check completed with Lucinda VAUGHN, Patient skin is dry and flakey over entire body, right inner heal is boggy, top of right ear has some redness, patient states , also has some redness on face, patient is also very jaundiced

## 2018-03-21 NOTE — ASSESSMENT & PLAN NOTE
There is no longer melanotic odor  Her hemoglobin is stable  She says her stools are no longer dark  Occult blood was negative

## 2018-03-21 NOTE — ASSESSMENT & PLAN NOTE
Complex cystic mass R ovary   elevated  GYN /onc requesting diagnostic paracentesis  Cannot do after Lovenox  Will do in a.m.

## 2018-03-21 NOTE — PROGRESS NOTES
Patient pulled piv out, per pt woke up and thought she was at home, pt reoriented self. Assisted pt to restroom. Vitals stable. No other needs at this time.

## 2018-03-22 NOTE — PROGRESS NOTES
Sami from Lab called with critical result of Mg 0.9 and phosphorus <1 at 0508. Critical lab result read back to sami.     Another RN had already paged hospitalist and waited for response. This RN repaged hospitalist at 0524. Awaiting call back. Will continue to monitor

## 2018-03-22 NOTE — PROGRESS NOTES
Shift report received and assessment completed. No family at bedside. Mg and Phos are low. Current IV running for both. Pt indicates no distress. Call light placed within reach, bed in lowest position, and bed alarm is set. Will continue to monitor patient.

## 2018-03-22 NOTE — CARE PLAN
Problem: Safety  Goal: Will remain free from injury  Outcome: PROGRESSING AS EXPECTED  Calls appropriately, call light within reach, low bed    Problem: Infection  Goal: Will remain free from infection  Outcome: PROGRESSING AS EXPECTED  VSS, denies chills or rigors

## 2018-03-23 PROBLEM — E83.42 HYPOMAGNESEMIA: Status: ACTIVE | Noted: 2018-01-01

## 2018-03-23 PROBLEM — E83.39 HYPOPHOSPHATEMIA: Status: ACTIVE | Noted: 2018-01-01

## 2018-03-23 NOTE — PROGRESS NOTES
Patient was sleeping for noon medications. Denied all symptoms of withdrawal at this time. Able to ambulate without issue SBA to bathroom. One episode of incontinence earlier. Will continue to monitor. Okay to remove monitor and shower when patient is ready.

## 2018-03-23 NOTE — CARE PLAN
Problem: Safety  Goal: Will remain free from falls    Intervention: Implement fall precautions   03/21/18 0103 03/22/18 2000   OTHER   Environmental Precautions --  Treaded Slipper Socks on Patient;Personal Belongings, Wastebasket, Call Bell etc. in Easy Reach;Report Given to Other Health Care Providers Regarding Fall Risk;Bed in Low Position;Communication Sign for Patients & Families;Mobility Assessed & Appropriate Sign Placed   Bedrails Bedrails Closest to Bathroom Down --          Problem: Respiratory:  Goal: Respiratory status will improve    Intervention: Assess and monitor pulmonary status   03/22/18 0800 03/22/18 2000 03/23/18 0400   OTHER   O2 (LPM) --  --  0   Pre/Post Intervention Pre Intervention Assessment --  --    RUL Breath Sounds --  Clear --    RML Breath Sounds --  Clear --    RLL Breath Sounds --  Diminished --    MITCH Breath Sounds --  Clear --    LLL Breath Sounds --  Diminished --    Vitals   Respiration --  --  18   Pulse Oximetry --  --  92 %

## 2018-03-23 NOTE — PROGRESS NOTES
Bedside shift report completed. Patient sleeping appears, clean, comfortable and dry. Recently given ativan for CIWA. Started itching, shaking and having anxiety last night. Appears without detox symptoms at this time. Will continue to monitor.

## 2018-03-23 NOTE — CARE PLAN
Problem: Communication  Goal: The ability to communicate needs accurately and effectively will improve  Outcome: PROGRESSING AS EXPECTED  Patient communicates effectively at this time.     Problem: Safety  Goal: Will remain free from injury  Outcome: PROGRESSING AS EXPECTED  Patient verbalized understanding of safety interventions. Bed alarm on. Calls appropriately.

## 2018-03-23 NOTE — PROGRESS NOTES
Patient seen today by GYN Oncology, full consult to follow. The patient has alcoholic liver cirrhosis and a cystic adnexal mass and elevated . Patients has an adnexal mass which does not overtly appear malignant, but the only way to determine whether the mass is malignant is to surgically resect the mass. The patient has an elevated  which could be a result of Cirrhosis or underling occult malignancy, the patient currently is a poor surgical candidate due to the low platelets and coagulopathy, I would recommend a paracentesis to determine whether malignant cells are present in the ascites and if that is negative for malignancy the patient should follow up in our office for interval monitoring and/or resection.     Thank you for allowing me to participate in the care of Ms. Machado.   Ernie Alcantara

## 2018-03-24 NOTE — CARE PLAN
Problem: Safety  Goal: Will remain free from falls    Intervention: Implement fall precautions   03/21/18 0103 03/23/18 2000   OTHER   Environmental Precautions --  Treaded Slipper Socks on Patient;Personal Belongings, Wastebasket, Call Bell etc. in Easy Reach;Report Given to Other Health Care Providers Regarding Fall Risk;Bed in Low Position;Communication Sign for Patients & Families;Mobility Assessed & Appropriate Sign Placed   Bedrails Bedrails Closest to Bathroom Down --          Problem: Respiratory:  Goal: Respiratory status will improve    Intervention: Assess and monitor pulmonary status   03/23/18 0700 03/23/18 2000 03/24/18 0000   OTHER   O2 (LPM) --  --  0   Pre/Post Intervention Pre Intervention Assessment --  --    RUL Breath Sounds --  Clear --    RML Breath Sounds --  Diminished --    RLL Breath Sounds --  Diminished --    MITCH Breath Sounds --  Clear --    LLL Breath Sounds --  Diminished --    Vitals   Respiration --  --  18   Pulse Oximetry --  --  97 %

## 2018-03-24 NOTE — PROGRESS NOTES
[facility] Hospitalist Progress Note    Date of Service: 3/23/2018    Chief Complaint  52 y.o. Female with PMH alcoholic liver disease admitted 3/20/2018 with desire for detox, hypotension and markedly abnormal LFTs. She says she was urged to stop drinking by her significant other, she went to [facility] and when she was there she was noted to be hypotensive as well as jaundiced, they sent her here for further evaluation. CT of the abdomen was done to evaluate for cirrhosis there was incidental finding of a right ovarian cystic mass, ultrasound was ordered.    Interval Problem Update  GYN consultation appreciated  Recommendations discussed with GYN oncologist, patient  Unlikely that there is enough fluid to do by ultrasound-IR consultation placed for possible CT-guided.  Patient had CIWA scores 9 last night but seems calm today  Pressures transiently better but she still has some in the 80s.    Consultants/Specialty  None    Disposition  Anticipate home        Review of Systems   Constitutional: Negative for chills and fever.   Eyes: Negative for blurred vision.   Respiratory: Negative for cough and shortness of breath.    Cardiovascular: Negative for chest pain and palpitations.   Gastrointestinal: Negative for abdominal pain, blood in stool, melena, nausea and vomiting.   Genitourinary: Negative for dysuria.        She says her urine is still brown   Musculoskeletal: Negative for joint pain and myalgias.   Neurological: Positive for dizziness (improved). Negative for headaches.   Psychiatric/Behavioral: Positive for substance abuse (discussed cessation again). Negative for depression.      Physical Exam  Laboratory/Imaging   Hemodynamics  Temp (24hrs), Av.8 °C (98.2 °F), Min:36.1 °C (97 °F), Max:37.3 °C (99.1 °F)   Temperature: 36.6 °C (97.8 °F)  Pulse  Av.7  Min: 84  Max: 108    Blood Pressure: (!) 93/57 (Rn notified, Rn Present )      Respiratory      Respiration: 18, Pulse Oximetry: 95 %        RUL Breath  Sounds: Clear, RML Breath Sounds: Diminished, RLL Breath Sounds: Diminished, MITCH Breath Sounds: Clear, LLL Breath Sounds: Diminished    Fluids    Intake/Output Summary (Last 24 hours) at 03/23/18 1919  Last data filed at 03/23/18 1800   Gross per 24 hour   Intake             1560 ml   Output             2600 ml   Net            -1040 ml       Nutrition  Orders Placed This Encounter   Procedures   • Diet Order     Standing Status:   Standing     Number of Occurrences:   1     Order Specific Question:   Diet:     Answer:   Regular [1]     Physical Exam   Constitutional: She is oriented to person, place, and time. She appears well-developed. No distress.          HENT:   Head: Normocephalic and atraumatic.   Mouth/Throat: Oropharynx is clear and moist.   Eyes: Conjunctivae and EOM are normal. Pupils are equal, round, and reactive to light. Right eye exhibits no discharge. Left eye exhibits no discharge. Scleral icterus is present.   Neck: Neck supple.   Cardiovascular: Normal rate and regular rhythm.    Pulmonary/Chest: Effort normal and breath sounds normal.   Abdominal: Soft. Bowel sounds are normal. She exhibits no distension and no mass. There is no tenderness. There is no rebound and no guarding.   Musculoskeletal: She exhibits no edema or tenderness.   Neurological: She is alert and oriented to person, place, and time.   Still no tremor   Skin: Skin is warm and dry. She is not diaphoretic. No erythema.   Jaundice     Psychiatric: She has a normal mood and affect.   Nursing note and vitals reviewed.      Recent Labs      03/21/18 0321 03/22/18 0335 03/23/18   0308   WBC  4.7*  5.4  6.5   RBC  3.19*  3.03*  2.91*   HEMOGLOBIN  11.5*  10.9*  10.3*   HEMATOCRIT  31.6*  29.9*  29.1*   MCV  99.1*  98.7*  100.0*   MCH  36.1*  36.0*  35.4*   MCHC  36.4*  36.5*  35.4*   RDW  51.0*  52.6*  56.5*   PLATELETCT  58*  90*  156*   MPV  12.7  12.1  11.3     Recent Labs      03/21/18   0321  03/22/18   0335  03/23/18   0308    SODIUM  129*  135  135   POTASSIUM  3.0*  3.7  4.1   CHLORIDE  90*  101  105   CO2  27  25  20   GLUCOSE  102*  91  85   BUN  18  7*  6*   CREATININE  0.76  0.50  0.43*   CALCIUM  9.0  8.8  7.7*     Recent Labs      03/23/18   0308   INR  1.49*                  Assessment/Plan     * Hypotension- (present on admission)   Assessment & Plan    Possibly improved-continue IV fluids for now  If no further low blood pressures will discontinue tomorrow        Alcoholic cirrhosis (CMS-HCC)- (present on admission)   Assessment & Plan    Alcoholic hepatitis  Maddrey score 13 on admit- steroids unlikely to benefit (new 20.9 2/2 increased PT)  Meld score 27 (~ 50%  3mo mortality)- new 19        Hyponatremia- (present on admission)   Assessment & Plan    Resolved        Hypomagnesemia   Assessment & Plan    Replacing        Hypophosphatemia   Assessment & Plan    Replacing        Melena   Assessment & Plan    There is no longer melanotic odor  Her hemoglobin is stable  She says her stools are no longer dark  Occult blood was negative        Ovarian mass, right- (present on admission)   Assessment & Plan    Complex cystic mass R ovary   elevated  GYN /onc requesting diagnostic paracentesis        Coagulopathy (CMS-HCC)- (present on admission)   Assessment & Plan    mild        Thrombocytopenia (CMS-HCC)- (present on admission)   Assessment & Plan    2/2 EtOH and liver dz        Alcoholism (CMS-HCC)- (present on admission)   Assessment & Plan    Last use 24 hours prior to admission  Has history of severe alcohol withdrawal  CIWA scores 9 overnight- she is much calmer today  Remains committed to cessation        Macrocytic anemia- (present on admission)   Assessment & Plan    Likely 2/2 liver disease and EtOH-  Recent folate borderline low- supplementing PO          Quality-Core Measures   Reviewed items::  Labs reviewed and Medications reviewed  Rhodes catheter::  No Rhodes  DVT prophylaxis pharmacological::  Contraindicated  - High bleeding risk  DVT prophylaxis - mechanical:  SCDs  Assessed for rehabilitation services:  Patient returned to prior level of function, rehabilitation not indicated at this time

## 2018-03-24 NOTE — CARE PLAN
Problem: Safety  Goal: Will remain free from injury  Outcome: PROGRESSING AS EXPECTED  Fall precautions in place. Treaded socks on pt. Appropriate signs on doorway. IV pole on same side as bathroom. Bedrails up. Bed in lowest position and locked.  Call light and phone within reach. Patient educated on importance of calling nurses before getting out of bed, verbalizes understanding. Bed alarm active    Problem: Knowledge Deficit  Goal: Knowledge of disease process/condition, treatment plan, diagnostic tests, and medications will improve  Outcome: PROGRESSING AS EXPECTED  Patient  educated about POC.  All questions answered in regards to disease process, treatment, and diet.  Patient  verbalized understanding and voiced no further questions at this time.

## 2018-03-25 NOTE — PROGRESS NOTES
Bedside report received, pt care assumed, tele box on.  Pt denies any additional needs at this time. Bed in lowest position, bed alarm on pt educated on fall risk and verbalized understanding, call light within reach. Discussed with patient plan for hourly rounding and assisted mobilization to the bathroom at regular intervals. Educated patient about increased fall risk with hypotension.  Patient verbalized understanding of risk and need to call for assistance prior to mobilization.

## 2018-03-25 NOTE — CARE PLAN
Problem: Communication  Goal: The ability to communicate needs accurately and effectively will improve    Intervention: Educate patient and significant other/support system about the plan of care, procedures, treatments, medications and allow for questions  Discussed with patient plan for hourly rounding for assistance to restroom.  Updated patient about plan of care, including scheduled echocardiogram      Problem: Safety  Goal: Will remain free from injury  Outcome: PROGRESSING AS EXPECTED  Fall precautions in place. Treaded socks on pt. Appropriate signs on doorway. IV pole on same side as bathroom. Bedrails up. Bed in lowest position and locked.  Call light and phone within reach. Patient educated on importance of calling nurses before getting out of bed, verbalizes understanding. Bed alarm active. Hourly rounding in place.

## 2018-03-25 NOTE — CARE PLAN
Problem: Infection  Goal: Will remain free from infection    Intervention: Implement standard precautions and perform hand washing before and after patient contact  Hand hygiene performed before and after contact with patient throughout shift.

## 2018-03-25 NOTE — CARE PLAN
Problem: Safety  Goal: Will remain free from falls    Intervention: Implement fall precautions  Educated patient on importance of calling for assistance

## 2018-03-25 NOTE — PROGRESS NOTES
[facility] Hospitalist Progress Note    Date of Service: 3/25/2018    Chief Complaint  52 y.o. Female with PMH alcoholic liver disease admitted 3/20/2018 with desire for detox, hypotension and markedly abnormal LFTs. She says she was urged to stop drinking by her significant other, she went to [facility] and when she was there she was noted to be hypotensive as well as jaundiced, they sent her here for further evaluation. CT of the abdomen was done to evaluate for cirrhosis there was incidental finding of a right ovarian cystic mass, ultrasound was ordered.    Interval Problem Update  Hypotension persistent  CIWA 3-9 overnight  Patient seems in a better mood today  She said her spouse recently visited  Seen with RN- patient does not seem to be benefiting from IV fluids-she starting to get crackles  IV fluids discontinued  Obtaining echocardiogram due to persistent hypotension    Consultants/Specialty  None    Disposition  Anticipate home        Review of Systems   Constitutional: Negative for chills and fever.   Eyes: Negative for blurred vision.   Respiratory: Negative for cough and shortness of breath.    Cardiovascular: Negative for chest pain and palpitations.   Gastrointestinal: Negative for abdominal pain, blood in stool, diarrhea, melena, nausea and vomiting.   Genitourinary: Negative for dysuria.        Still has dark urine     Musculoskeletal: Negative for joint pain and myalgias.   Neurological: Negative for dizziness and headaches.   Psychiatric/Behavioral: Negative for depression.      Physical Exam  Laboratory/Imaging   Hemodynamics  Temp (24hrs), Av.3 °C (99.2 °F), Min:36.7 °C (98.1 °F), Max:38 °C (100.4 °F)   Temperature: 37.2 °C (99 °F)  Pulse  Av.5  Min: 84  Max: 108    Blood Pressure: (!) 87/58 (rn notified)      Respiratory      Respiration: 18, Pulse Oximetry: 91 %        RUL Breath Sounds: Clear, RML Breath Sounds: Diminished, RLL Breath Sounds: Diminished, MITCH Breath Sounds: Clear, LLL  Breath Sounds: Diminished    Fluids    Intake/Output Summary (Last 24 hours) at 03/25/18 0800  Last data filed at 03/24/18 2000   Gross per 24 hour   Intake             1400 ml   Output              500 ml   Net              900 ml       Nutrition  Orders Placed This Encounter   Procedures   • Diet Order     Standing Status:   Standing     Number of Occurrences:   1     Order Specific Question:   Diet:     Answer:   Regular [1]   • DIET NPO     Standing Status:   Standing     Number of Occurrences:   1     Order Specific Question:   Restrict to:     Answer:   Sips with Medications [3]     Physical Exam   Constitutional: She is oriented to person, place, and time. She appears well-developed. No distress.          HENT:   Head: Normocephalic and atraumatic.   Mouth/Throat: Oropharynx is clear and moist.   Eyes: Conjunctivae and EOM are normal. Pupils are equal, round, and reactive to light. Right eye exhibits no discharge. Left eye exhibits no discharge. Scleral icterus is present.   Neck: Neck supple.   Cardiovascular: Normal rate and regular rhythm.    Pulmonary/Chest: Effort normal.   Reduced excursion with fine rales bilaterally, improved after coughing  May be atelectatic   Abdominal: Soft. Bowel sounds are normal. She exhibits distension (somewhat increased). She exhibits no mass. There is no tenderness. There is no rebound and no guarding.   Musculoskeletal: She exhibits no edema or tenderness.   Neurological: She is alert and oriented to person, place, and time.   Skin: Skin is warm and dry. She is not diaphoretic. No erythema.   Jaundice     Psychiatric: She has a normal mood and affect.   Nursing note and vitals reviewed.      Recent Labs      03/23/18   0308   WBC  6.5   RBC  2.91*   HEMOGLOBIN  10.3*   HEMATOCRIT  29.1*   MCV  100.0*   MCH  35.4*   MCHC  35.4*   RDW  56.5*   PLATELETCT  156*   MPV  11.3     Recent Labs      03/23/18   0308   SODIUM  135   POTASSIUM  4.1   CHLORIDE  105   CO2  20   GLUCOSE   85   BUN  6*   CREATININE  0.43*   CALCIUM  7.7*     Recent Labs      03/23/18   0308   INR  1.49*                  Assessment/Plan     * Hypotension- (present on admission)   Assessment & Plan    Continues  Fluids not impacting blood pressure-may be becoming volume overloaded-discontinued  Cortisol was negative  Obtaining echocardiogram        Alcoholic cirrhosis (CMS-HCC)- (present on admission)   Assessment & Plan    Alcoholic hepatitis  Maddrey score 13 on admit- steroids unlikely to benefit (new 20.9 2/2 increased PT)  Meld score 27 (~ 50%  3mo mortality)- new 19        Hyponatremia- (present on admission)   Assessment & Plan    Resolved        Hypomagnesemia   Assessment & Plan    A.m. labs        Hypophosphatemia   Assessment & Plan    A.m. lab        Melena- (present on admission)   Assessment & Plan    There is no longer melanotic odor  Her hemoglobin is stable  She says her stools are no longer dark  Occult blood was negative        Ovarian mass, right- (present on admission)   Assessment & Plan    Complex cystic mass R ovary   elevated  GYN /onc requesting diagnostic paracentesis        Coagulopathy (CMS-HCC)- (present on admission)   Assessment & Plan    mild        Thrombocytopenia (CMS-HCC)- (present on admission)   Assessment & Plan    2/2 EtOH and liver dz        Alcoholism (CMS-HCC)- (present on admission)   Assessment & Plan    Last use 24 hours prior to admission  Has history of severe alcohol withdrawal  Her scores have not significantly increased but she is becoming more irritable          Macrocytic anemia- (present on admission)   Assessment & Plan    Likely 2/2 liver disease and EtOH-  Recent folate borderline low- supplementing PO          Quality-Core Measures   Reviewed items::  Labs reviewed and Medications reviewed  Rhodes catheter::  No Rhodes  DVT prophylaxis pharmacological::  Contraindicated - High bleeding risk  DVT prophylaxis - mechanical:  SCDs  Assessed for rehabilitation  services:  Patient returned to prior level of function, rehabilitation not indicated at this time

## 2018-03-25 NOTE — PROGRESS NOTES
[facility] Hospitalist Progress Note    Date of Service: 3/24/2018    Chief Complaint  52 y.o. Female with PMH alcoholic liver disease admitted 3/20/2018 with desire for detox, hypotension and markedly abnormal LFTs. She says she was urged to stop drinking by her significant other, she went to [facility] and when she was there she was noted to be hypotensive as well as jaundiced, they sent her here for further evaluation. CT of the abdomen was done to evaluate for cirrhosis there was incidental finding of a right ovarian cystic mass, ultrasound was ordered.    Interval Problem Update  Paracentesis not scheduled til Monday-nonemergent  Patient upset about delay  Is also upset because it is taking too long to have her call light answered, she is on fall precautions and has had accidents in her bed. In addition to this her volume on her television is not working has not been fixed.  Issues brought to the attention of the discharge nurse  Patient is upset and says she might just go home-seems less committed to alcohol cessation  I discussed that her blood pressure is still not stable and I'm trying to add medications, I cannot even DC her IV fluids as yet due to low blood pressure.    Consultants/Specialty  None    Disposition  Anticipate home        Review of Systems   Constitutional: Negative for chills and fever.   Eyes: Negative for blurred vision.   Respiratory: Negative for cough and shortness of breath.    Cardiovascular: Negative for chest pain and palpitations.   Gastrointestinal: Positive for diarrhea. Negative for abdominal pain, blood in stool, melena, nausea and vomiting.   Genitourinary: Negative for dysuria.        Still has dark urine  Incontinence   Musculoskeletal: Negative for joint pain and myalgias.   Neurological: Positive for dizziness (improved). Negative for headaches.   Psychiatric/Behavioral: Positive for substance abuse. Negative for depression. The patient is nervous/anxious.       Physical Exam   Laboratory/Imaging   Hemodynamics  Temp (24hrs), Av.9 °C (98.5 °F), Min:36.6 °C (97.9 °F), Max:37.5 °C (99.5 °F)   Temperature: 36.7 °C (98.1 °F)  Pulse  Av.3  Min: 84  Max: 108    Blood Pressure: (!) 88/56      Respiratory      Respiration: 16, Pulse Oximetry: 95 %        RUL Breath Sounds: Clear, RML Breath Sounds: Diminished, RLL Breath Sounds: Diminished, MITCH Breath Sounds: Clear, LLL Breath Sounds: Diminished    Fluids    Intake/Output Summary (Last 24 hours) at 18 1729  Last data filed at 18 1500   Gross per 24 hour   Intake             2700 ml   Output              400 ml   Net             2300 ml       Nutrition  Orders Placed This Encounter   Procedures   • Diet Order     Standing Status:   Standing     Number of Occurrences:   1     Order Specific Question:   Diet:     Answer:   Regular [1]   • DIET NPO     Standing Status:   Standing     Number of Occurrences:   1     Order Specific Question:   Restrict to:     Answer:   Sips with Medications [3]     Physical Exam   Constitutional: She is oriented to person, place, and time. She appears well-developed. No distress.          HENT:   Head: Normocephalic and atraumatic.   Mouth/Throat: Oropharynx is clear and moist.   Eyes: Conjunctivae and EOM are normal. Pupils are equal, round, and reactive to light. Right eye exhibits no discharge. Left eye exhibits no discharge.   Neck: Neck supple.   Cardiovascular: Normal rate and regular rhythm.    Pulmonary/Chest: Effort normal and breath sounds normal.   Abdominal: Soft. Bowel sounds are normal. She exhibits no distension and no mass. There is no tenderness. There is no rebound and no guarding.   Musculoskeletal: She exhibits no edema or tenderness.   Neurological: She is alert and oriented to person, place, and time.   Still no tremor   Skin: Skin is warm and dry. She is not diaphoretic. No erythema.   Jaundice     Psychiatric:   Angry and irritable   Nursing note and vitals reviewed.       Recent Labs      03/22/18   0335  03/23/18   0308   WBC  5.4  6.5   RBC  3.03*  2.91*   HEMOGLOBIN  10.9*  10.3*   HEMATOCRIT  29.9*  29.1*   MCV  98.7*  100.0*   MCH  36.0*  35.4*   MCHC  36.5*  35.4*   RDW  52.6*  56.5*   PLATELETCT  90*  156*   MPV  12.1  11.3     Recent Labs      03/22/18   0335  03/23/18   0308   SODIUM  135  135   POTASSIUM  3.7  4.1   CHLORIDE  101  105   CO2  25  20   GLUCOSE  91  85   BUN  7*  6*   CREATININE  0.50  0.43*   CALCIUM  8.8  7.7*     Recent Labs      03/23/18   0308   INR  1.49*                  Assessment/Plan     * Hypotension- (present on admission)   Assessment & Plan    Continues, adding Florinef  Cortisol was negative  Explained the patient remains a fall risk and I cannot remove her fall precautions  Charge nurse asked to ask people to respond to call light little quicker.        Alcoholic cirrhosis (CMS-HCC)- (present on admission)   Assessment & Plan    Alcoholic hepatitis  Maddrey score 13 on admit- steroids unlikely to benefit (new 20.9 2/2 increased PT)  Meld score 27 (~ 50%  3mo mortality)- new 19        Hyponatremia- (present on admission)   Assessment & Plan    Resolved        Hypomagnesemia   Assessment & Plan    Replacing        Hypophosphatemia   Assessment & Plan    Replacing        Melena   Assessment & Plan    There is no longer melanotic odor  Her hemoglobin is stable  She says her stools are no longer dark  Occult blood was negative        Ovarian mass, right- (present on admission)   Assessment & Plan    Complex cystic mass R ovary   elevated  GYN /onc requesting diagnostic paracentesis        Coagulopathy (CMS-HCC)- (present on admission)   Assessment & Plan    mild        Thrombocytopenia (CMS-HCC)- (present on admission)   Assessment & Plan    2/2 EtOH and liver dz        Alcoholism (CMS-HCC)- (present on admission)   Assessment & Plan    Last use 24 hours prior to admission  Has history of severe alcohol withdrawal  Her scores have not  significantly increased but she is becoming more irritable  Seems less committed to quitting today        Macrocytic anemia- (present on admission)   Assessment & Plan    Likely 2/2 liver disease and EtOH-  Recent folate borderline low- supplementing PO          Quality-Core Measures   Reviewed items::  Labs reviewed and Medications reviewed  Rhodes catheter::  No Rhodes  DVT prophylaxis pharmacological::  Contraindicated - High bleeding risk  DVT prophylaxis - mechanical:  SCDs  Assessed for rehabilitation services:  Patient returned to prior level of function, rehabilitation not indicated at this time

## 2018-03-26 NOTE — CARE PLAN
Problem: Safety  Goal: Will remain free from injury  Outcome: PROGRESSING AS EXPECTED  Pt has been free from falls or injuries this shift. Pt uses call light appropriately before getting out of bed. Falls education reviewed with patient.    Problem: Discharge Barriers/Planning  Goal: Patient's continuum of care needs will be met  Outcome: PROGRESSING AS EXPECTED  POC reviewed with patient throughout shift. Pt verbalizes understanding.    Problem: Urinary Elimination:  Goal: Ability to reestablish a normal urinary elimination pattern will improve  Outcome: PROGRESSING AS EXPECTED  Pt voiding adequate amounts this shift. Going to bathroom to void. Urine still bright yellow in color. Pt denies any pain/urgency when voiding.

## 2018-03-26 NOTE — PROGRESS NOTES
[facility] Hospitalist Progress Note    Date of Service: 3/26/2018    Chief Complaint  52 y.o. Female with PMH alcoholic liver disease admitted 3/20/2018 with desire for detox, hypotension and markedly abnormal LFTs. She says she was urged to stop drinking by her significant other, she went to [facility] and when she was there she was noted to be hypotensive as well as jaundiced, they sent her here for further evaluation. CT of the abdomen was done to evaluate for cirrhosis there was incidental finding of a right ovarian cystic mass, ultrasound was ordered.    Interval Problem Update  Hypotension persistent  CIWA 3-9 overnight  Patient seems in a better mood today  She said her spouse recently visited  Seen with RN- patient does not seem to be benefiting from IV fluids-she starting to get crackles  IV fluids discontinued  Obtaining echocardiogram due to persistent hypotension    Consultants/Specialty  None    Disposition  Anticipate home        Review of Systems   Constitutional: Negative for chills and fever.   Eyes: Negative for blurred vision.   Respiratory: Negative for cough and shortness of breath.    Cardiovascular: Negative for chest pain and palpitations.   Gastrointestinal: Negative for abdominal pain, blood in stool, diarrhea, melena, nausea and vomiting.   Genitourinary: Negative for dysuria.        Still has dark urine     Musculoskeletal: Negative for joint pain and myalgias.   Neurological: Negative for dizziness and headaches.   Psychiatric/Behavioral: Negative for depression.      Physical Exam  Laboratory/Imaging   Hemodynamics  Temp (24hrs), Av.9 °C (98.4 °F), Min:36.4 °C (97.6 °F), Max:37.2 °C (98.9 °F)   Temperature: 37 °C (98.6 °F)  Pulse  Av.1  Min: 79  Max: 108    Blood Pressure: (!) 97/62      Respiratory      Respiration: 17, Pulse Oximetry: 94 %        RUL Breath Sounds: Clear, RML Breath Sounds: Diminished, RLL Breath Sounds: Fine Crackles, MITCH Breath Sounds: Clear, LLL Breath Sounds:  Fine Crackles    Fluids  No intake or output data in the 24 hours ending 03/26/18 1630    Nutrition  Orders Placed This Encounter   Procedures   • DIET ORDER     Standing Status:   Standing     Number of Occurrences:   1     Order Specific Question:   Diet:     Answer:   Regular [1]     Physical Exam   Constitutional: She is oriented to person, place, and time. She appears well-developed. No distress.          HENT:   Head: Normocephalic and atraumatic.   Mouth/Throat: Oropharynx is clear and moist.   Eyes: Conjunctivae and EOM are normal. Pupils are equal, round, and reactive to light. Right eye exhibits no discharge. Left eye exhibits no discharge. Scleral icterus (worse) is present.   Neck: Neck supple.   Cardiovascular: Normal rate and regular rhythm.    Pulmonary/Chest: Effort normal.   Improving breath sounds   Abdominal: Soft. Bowel sounds are normal. She exhibits distension (somewhat increased). She exhibits no mass. There is no tenderness. There is no rebound and no guarding.   Musculoskeletal: She exhibits no edema or tenderness.   Neurological: She is alert and oriented to person, place, and time.   Skin: Skin is warm and dry. She is not diaphoretic. No erythema.   Jaundice     Psychiatric: She has a normal mood and affect.   Nursing note and vitals reviewed.          Recent Labs      03/26/18   0232   SODIUM  133*   POTASSIUM  3.5*   CHLORIDE  104   CO2  20   GLUCOSE  81   BUN  8   CREATININE  0.48*   CALCIUM  7.3*     Recent Labs      03/26/18   0232   INR  1.46*                  Assessment/Plan     * Hypotension- (present on admission)   Assessment & Plan    Continues  Fluids not impacting blood pressure-may be becoming volume overloaded-discontinued  Echo pending  OT to eval        Alcoholic cirrhosis (CMS-HCC)- (present on admission)   Assessment & Plan    Alcoholic hepatitis  Maddrey score 13 on admit- steroids unlikely to benefit ( 20.9 2/2 increased PT)  3/26 25.1  Meld score 27 (~ 50%  3mo  mortality)> 19> 23    On 3/26 bili higher- unclear why- starting steroids        Hyponatremia- (present on admission)   Assessment & Plan    Resolved        Hypomagnesemia   Assessment & Plan    Borderline-initiate by mouth replacement        Hypophosphatemia   Assessment & Plan    Resolved        Melena- (present on admission)   Assessment & Plan    There is no longer melanotic odor  Her hemoglobin is stable  She says her stools are no longer dark  Occult blood was negative        Ovarian mass, right- (present on admission)   Assessment & Plan    Complex cystic mass R ovary   elevated  GYN /onc requesting diagnostic paracentesis  Cannot do after Lovenox  Will do in a.m.        Coagulopathy (CMS-HCC)- (present on admission)   Assessment & Plan    mild        Thrombocytopenia (CMS-HCC)- (present on admission)   Assessment & Plan    2/2 EtOH and liver dz        Alcoholism (CMS-HCC)- (present on admission)   Assessment & Plan    Last use 24 hours prior to admission  Has history of severe alcohol withdrawal  she states he remains committed to abstaining        Macrocytic anemia- (present on admission)   Assessment & Plan    Likely 2/2 liver disease and EtOH-  Recent folate borderline low- supplementing PO          Quality-Core Measures   Reviewed items::  Labs reviewed and Medications reviewed  Rhodes catheter::  No Rhodes  DVT prophylaxis pharmacological::  Contraindicated - High bleeding risk  DVT prophylaxis - mechanical:  SCDs  Assessed for rehabilitation services:  Patient returned to prior level of function, rehabilitation not indicated at this time

## 2018-03-26 NOTE — CARE PLAN
Problem: Infection  Goal: Will remain free from infection    Intervention: Implement standard precautions and perform hand washing before and after patient contact  Hand hygiene performed before and after contact with patient

## 2018-03-26 NOTE — PROGRESS NOTES
Received report on pt from night shift, RN. Pt A/Ox4, resting in bed. Denies any pain. Bed alarm placed, pt educated on use of call light for all needs. POC reviewed with patient and she verbalizes understanding. NPO for paracentesis today. Pt denies any questions at this time.

## 2018-03-26 NOTE — CARE PLAN
Problem: Safety  Goal: Will remain free from falls    Intervention: Implement fall precautions  Fall precautions in place

## 2018-03-27 NOTE — CARE PLAN
Problem: Safety  Goal: Will remain free from falls    Intervention: Implement fall precautions  Pt educated on risk for falls prevention

## 2018-03-27 NOTE — PROGRESS NOTES
Was able to find MD during round for updates around 11:30. Patient has been stable throughout the day. It was decided that she would not discharge due to elevated labs.

## 2018-03-27 NOTE — PROGRESS NOTES
Patient returned from paracentesis upset because the procedure was not preformed. She says she's been here 3 extra days for this and then they decided not to do it. She is upset and states she wants to leave. Paged Hospitalist service for follow up and possible discharge.

## 2018-03-27 NOTE — PROGRESS NOTES
Assumed car of patient at bedside shift report. Night shift states patient has been getting up Ad zayda and is stable. In bathroom for bedside shift report. Appears steady, clean, comfortable and dry. Paracentesis today at 0900.Will continue to monitor.

## 2018-03-27 NOTE — PROGRESS NOTES
Bedside report received, Pt care assumed. Tele box on. VSS. Pt assessment complete. Pt AOX4, no signs of distress noted at this time.  Pt c/o of 0/10 pain. Pt denies any additional needs at this time. Bed in lowest position, ambulates with standby assistance. POC discussed with Pt/family, verbalizes understanding, no questions at this time. Pt educated on fall risk and verbalizes understanding, call light within reach, will continue to monitor.

## 2018-03-27 NOTE — PROGRESS NOTES
[facility] Hospitalist Progress Note    Date of Service: 3/27/2018    Chief Complaint  52 y.o. Female with PMH alcoholic liver disease admitted 3/20/2018 with desire for detox, hypotension and markedly abnormal LFTs. She says she was urged to stop drinking by her significant other, she went to [facility] and when she was there she was noted to be hypotensive as well as jaundiced, they sent her here for further evaluation. CT of the abdomen was done to evaluate for cirrhosis there was incidental finding of a right ovarian cystic mass, ultrasound was ordered.    Interval Problem Update  Pt seen and examined, afebrile, no overnight events no nausea or vomiting,  Paracentesis could not be done due to minimal fluid.     Consultants/Specialty  None    Disposition  Anticipate home        Review of Systems   Constitutional: Negative for chills and fever.   Eyes: Negative for blurred vision.   Respiratory: Negative for cough and shortness of breath.    Cardiovascular: Negative for chest pain and palpitations.   Gastrointestinal: Negative for abdominal pain, blood in stool, diarrhea, melena, nausea and vomiting.   Genitourinary: Negative for dysuria.        Still has dark urine     Musculoskeletal: Negative for joint pain and myalgias.   Neurological: Negative for dizziness and headaches.   Psychiatric/Behavioral: Negative for depression.      Physical Exam  Laboratory/Imaging   Hemodynamics  Temp (24hrs), Av.8 °C (98.2 °F), Min:36.6 °C (97.9 °F), Max:37.1 °C (98.7 °F)   Temperature: 36.9 °C (98.5 °F)  Pulse  Av.1  Min: 71  Max: 108    Blood Pressure: (!) 89/62 (RN notified)      Respiratory      Respiration: 18, Pulse Oximetry: 92 %        RUL Breath Sounds: Clear, RML Breath Sounds: Clear, RLL Breath Sounds: Clear, MITCH Breath Sounds: Clear, LLL Breath Sounds: Clear    Fluids    Intake/Output Summary (Last 24 hours) at 18 1629  Last data filed at 18 0831   Gross per 24 hour   Intake              160 ml    Output                0 ml   Net              160 ml       Nutrition  Orders Placed This Encounter   Procedures   • DIET ORDER     Standing Status:   Standing     Number of Occurrences:   1     Order Specific Question:   Diet:     Answer:   Regular [1]     Physical Exam   Constitutional: She is oriented to person, place, and time. She appears well-developed. No distress.          HENT:   Head: Normocephalic and atraumatic.   Mouth/Throat: Oropharynx is clear and moist.   Eyes: Conjunctivae and EOM are normal. Pupils are equal, round, and reactive to light. Right eye exhibits no discharge. Left eye exhibits no discharge.   Neck: Neck supple.   Cardiovascular: Normal rate and regular rhythm.    Pulmonary/Chest: Effort normal.   Improving breath sounds   Abdominal: Soft. Bowel sounds are normal. She exhibits distension (somewhat increased). She exhibits no mass. There is no tenderness. There is no rebound and no guarding.   Musculoskeletal: She exhibits no edema or tenderness.   Neurological: She is alert and oriented to person, place, and time.   Skin: Skin is warm and dry. She is not diaphoretic. No erythema.   Jaundice     Psychiatric: She has a normal mood and affect.   Nursing note and vitals reviewed.          Recent Labs      03/26/18   0232  03/27/18   1041   SODIUM  133*  136   POTASSIUM  3.5*  3.5*   CHLORIDE  104  103   CO2  20  23   GLUCOSE  81  103*   BUN  8  9   CREATININE  0.48*  0.42*   CALCIUM  7.3*  8.3*     Recent Labs      03/26/18   0232   INR  1.46*                  Assessment/Plan     * Hypotension- (present on admission)   Assessment & Plan    Continues  Fluids not impacting blood pressure-may be becoming volume overloaded-discontinued  Echo pending  OT to eval        Alcoholic cirrhosis (CMS-HCC)- (present on admission)   Assessment & Plan    Alcoholic hepatitis  Maddrey score 13 on admit- steroids unlikely to benefit ( 20.9 2/2 increased PT)  3/26 25.1  Meld score 27 (~ 50%  3mo mortality)>  19> 23  bili higher- unclear why- cont steroids monitor         Hyponatremia- (present on admission)   Assessment & Plan    Resolved        Hypomagnesemia   Assessment & Plan    Borderline-initiate by mouth replacement        Hypophosphatemia   Assessment & Plan    Resolved        Melena- (present on admission)   Assessment & Plan    There is no longer melanotic odor  Her hemoglobin is stable  She says her stools are no longer dark  Occult blood was negative        Ovarian mass, right- (present on admission)   Assessment & Plan    Complex cystic mass R ovary   elevated  GYN /onc requesting diagnostic paracentesis  Cannot do after Lovenox  Will do in a.m.        Coagulopathy (CMS-HCC)- (present on admission)   Assessment & Plan    mild        Thrombocytopenia (CMS-HCC)- (present on admission)   Assessment & Plan    2/2 EtOH and liver dz        Alcoholism (CMS-HCC)- (present on admission)   Assessment & Plan    Last use 24 hours prior to admission  Has history of severe alcohol withdrawal  she states he remains committed to abstaining        Macrocytic anemia- (present on admission)   Assessment & Plan    Likely 2/2 liver disease and EtOH-  Recent folate borderline low- supplementing PO          Quality-Core Measures   Reviewed items::  Labs reviewed and Medications reviewed  Rhodes catheter::  No Rhodes  DVT prophylaxis pharmacological::  Contraindicated - High bleeding risk  DVT prophylaxis - mechanical:  SCDs  Assessed for rehabilitation services:  Patient returned to prior level of function, rehabilitation not indicated at this time

## 2018-03-28 NOTE — PROGRESS NOTES
Patient given hand out on Cirrhosis from [employer] with the titles, treating cirrhosis, understanding cirrhosis, and indirect bilirubin. Handed to patient.

## 2018-03-28 NOTE — CARE PLAN
Problem: Communication  Goal: The ability to communicate needs accurately and effectively will improve  Outcome: PROGRESSING AS EXPECTED      Problem: Safety  Goal: Will remain free from injury  Outcome: PROGRESSING AS EXPECTED      Problem: Bowel/Gastric:  Goal: Will not experience complications related to bowel motility  Outcome: PROGRESSING AS EXPECTED

## 2018-03-28 NOTE — PROGRESS NOTES
Ambulated with patient for entire length of floor. Tolerated well. Stated would like to walk again. No assistance required but likes to walk with staff to be safe.

## 2018-03-28 NOTE — PROGRESS NOTES
[facility] Hospitalist Progress Note    Date of Service: 3/28/2018    Chief Complaint  52 y.o. Female with PMH alcoholic liver disease admitted 3/20/2018 with desire for detox, hypotension and markedly abnormal LFTs. She says she was urged to stop drinking by her significant other, she went to [facility] and when she was there she was noted to be hypotensive as well as jaundiced, they sent her here for further evaluation. CT of the abdomen was done to evaluate for cirrhosis there was incidental finding of a right ovarian cystic mass, ultrasound was ordered.    Interval Problem Update  3/27:Pt seen and examined, afebrile, no overnight events no nausea or vomiting,  Paracentesis could not be done due to minimal fluid.     3/28: No overnight events, afebrile, T.Bili trending down after started on steroids , will monitor  Consultants/Specialty  None    Disposition  Anticipate home        Review of Systems   Constitutional: Negative for chills and fever.   Eyes: Negative for blurred vision.   Respiratory: Negative for cough and shortness of breath.    Cardiovascular: Negative for chest pain and palpitations.   Gastrointestinal: Negative for abdominal pain, blood in stool, diarrhea, melena, nausea and vomiting.   Genitourinary: Negative for dysuria.        Still has dark urine     Musculoskeletal: Negative for joint pain and myalgias.   Neurological: Negative for dizziness and headaches.   Psychiatric/Behavioral: Negative for depression.      Physical Exam  Laboratory/Imaging   Hemodynamics  Temp (24hrs), Av.3 °C (97.4 °F), Min:36.1 °C (97 °F), Max:36.9 °C (98.5 °F)   Temperature: 36.1 °C (97 °F)  Pulse  Av.4  Min: 63  Max: 108    Blood Pressure: (!) 94/57      Respiratory      Respiration: 18, Pulse Oximetry: 93 %        RUL Breath Sounds: Clear, RML Breath Sounds: Clear, RLL Breath Sounds: Clear, MITCH Breath Sounds: Clear, LLL Breath Sounds: Clear    Fluids  No intake or output data in the 24 hours ending 18  1625    Nutrition  Orders Placed This Encounter   Procedures   • DIET ORDER     Standing Status:   Standing     Number of Occurrences:   1     Order Specific Question:   Diet:     Answer:   Regular [1]     Physical Exam   Constitutional: She is oriented to person, place, and time. She appears well-developed. No distress.          HENT:   Head: Normocephalic and atraumatic.   Mouth/Throat: Oropharynx is clear and moist.   Eyes: Conjunctivae and EOM are normal. Pupils are equal, round, and reactive to light. Right eye exhibits no discharge. Left eye exhibits no discharge.   Neck: Neck supple.   Cardiovascular: Normal rate and regular rhythm.    Pulmonary/Chest: Effort normal.   Improving breath sounds   Abdominal: Soft. Bowel sounds are normal. She exhibits distension (somewhat increased). She exhibits no mass. There is no tenderness. There is no rebound and no guarding.   Musculoskeletal: She exhibits no edema or tenderness.   Neurological: She is alert and oriented to person, place, and time.   Skin: Skin is warm and dry. She is not diaphoretic. No erythema.   Jaundice     Psychiatric: She has a normal mood and affect.   Nursing note and vitals reviewed.          Recent Labs      03/26/18   0232  03/27/18   1041  03/28/18   0753   SODIUM  133*  136  136   POTASSIUM  3.5*  3.5*  3.4*   CHLORIDE  104  103  103   CO2  20  23  25   GLUCOSE  81  103*  98   BUN  8  9  11   CREATININE  0.48*  0.42*  0.50   CALCIUM  7.3*  8.3*  8.5     Recent Labs      03/26/18   0232   INR  1.46*                  Assessment/Plan     * Hypotension- (present on admission)   Assessment & Plan    Continues  Fluids not impacting blood pressure-may be becoming volume overloaded-discontinued  Echo :Normal transthoracic echocardiogram  OT to eval        Alcoholic cirrhosis (CMS-HCC)- (present on admission)   Assessment & Plan    Alcoholic hepatitis  Maddrey score 13 on admit- steroids unlikely to benefit ( 20.9 2/2 increased PT)  3/26 25.1  Meld  score 27 (~ 50%  3mo mortality)> 19> 23  bili higher- unclear why- cont steroids slowly trending down   monitor        Hyponatremia- (present on admission)   Assessment & Plan    Resolved        Hypomagnesemia   Assessment & Plan    Borderline-initiate by mouth replacement        Hypophosphatemia   Assessment & Plan    Resolved        Melena- (present on admission)   Assessment & Plan    There is no longer melanotic odor  Her hemoglobin is stable  She says her stools are no longer dark  Occult blood was negative        Ovarian mass, right- (present on admission)   Assessment & Plan    Complex cystic mass R ovary   elevated  GYN /onc requesting diagnostic paracentesis  Cannot do after Lovenox  Will do in a.m.        Coagulopathy (CMS-HCC)- (present on admission)   Assessment & Plan    mild        Thrombocytopenia (CMS-HCC)- (present on admission)   Assessment & Plan    2/2 EtOH and liver dz        Alcoholism (CMS-HCC)- (present on admission)   Assessment & Plan    Last use 24 hours prior to admission  Has history of severe alcohol withdrawal  she states he remains committed to abstaining        Macrocytic anemia- (present on admission)   Assessment & Plan    Likely 2/2 liver disease and EtOH-  Recent folate borderline low- supplementing PO          Quality-Core Measures   Reviewed items::  Labs reviewed and Medications reviewed  Rhodes catheter::  No Rhodes  DVT prophylaxis pharmacological::  Contraindicated - High bleeding risk  DVT prophylaxis - mechanical:  SCDs  Assessed for rehabilitation services:  Patient returned to prior level of function, rehabilitation not indicated at this time

## 2018-03-28 NOTE — CARE PLAN
Problem: Infection  Goal: Will remain free from infection    Intervention: Assess signs and symptoms of infection  RN educated patient regarding the signs and symptoms of infection.  Patient verbalized understanding of education and denies any questions at this time.        Problem: Skin Integrity  Goal: Risk for impaired skin integrity will decrease    Intervention: Assess risk factors for impaired skin integrity and/or pressure ulcers  RN educated patient regarding the importance of regular movement to decrease the risk of pressure ulcer formation.  Patient verbalized understanding of education and denies any questions at this time.

## 2018-03-28 NOTE — PROGRESS NOTES
Bedside report completed.  Assumed pt care.  Pt AAOx4, resting in bed comfortably with no signs of labored breathing.  Pt tele monitor in place, cardiac rhythm being monitored.  Pt call light within reach, bed in low position, non skid socks in place.  Pt denies any pain or other distress at this time.  RN educated patient to plan of care.  Patient verbalized understanding of education and denies any questions at this time.

## 2018-03-28 NOTE — PROGRESS NOTES
Patient sleeping for bedside shift report. Per night RN the patient had difficulty falling asleep due to itching. She was given benadryl then additional dose of benadryl and itching continued. Finally she was able to rest and sleep in the early morning. Patient wants to go home today. Will wait for lab results. Continue to monitor.

## 2018-03-29 NOTE — DISCHARGE INSTRUCTIONS
Discharge Instructions    Discharged to home by taxi with self. Discharged via walking, hospital escort: Refused.  Special equipment needed: Not Applicable    Be sure to schedule a follow-up appointment with your primary care doctor or any specialists as instructed.     Discharge Plan:   Influenza Vaccine Indication: Not indicated: Previously immunized this influenza season and > 8 years of age    I understand that a diet low in cholesterol, fat, and sodium is recommended for good health. Unless I have been given specific instructions below for another diet, I accept this instruction as my diet prescription.   Other diet: General, no alcohol      Special Instructions: None    · Is patient discharged on Warfarin / Coumadin?   No     SCHEDULE FOLLOW UP WITH GYNECOLOGY AND PRIMARY CARE MD    Depression / Suicide Risk    As you are discharged [facility], it is important to learn how to keep safe from harming yourself.    Recognize the warning signs:  · Abrupt changes in personality, positive or negative- including increase in energy   · Giving away possessions  · Change in eating patterns- significant weight changes-  positive or negative  · Change in sleeping patterns- unable to sleep or sleeping all the time   · Unwillingness or inability to communicate  · Depression  · Unusual sadness, discouragement and loneliness  · Talk of wanting to die  · Neglect of personal appearance   · Rebelliousness- reckless behavior  · Withdrawal from people/activities they love  · Confusion- inability to concentrate     If you or a loved one observes any of these behaviors or has concerns about self-harm, here's what you can do:  · Talk about it- your feelings and reasons for harming yourself  · Remove any means that you might use to hurt yourself (examples: pills, rope, extension cords, firearm)  · Get professional help from the community (Mental Health, Substance Abuse, psychological counseling)  · Do not be  alone:Call your Safe Contact- someone whom you trust who will be there for you.  · Call your local CRISIS HOTLINE 484-1392 or 592-093-8419  · Call your local Children's Mobile Crisis Response Team [place] (304) 348-9628 or www.[employer]  · Call the toll free National Suicide Prevention Hotlines   · National Suicide Prevention Lifeline 759-000-FXKF (5760)  · [employer] Hope Line Network 800-SUICIDE (840-2586)        Alcoholic Liver Disease  Introduction  The liver is an organ that converts food into energy, absorbs vitamins from food, removes toxins from the blood, and makes proteins. Alcoholic liver disease happens when the liver becomes damaged due to alcohol consumption and it stops working properly.  What are the causes?  This condition is caused by drinking too much alcohol over a number of years.  What increases the risk?  This condition is more likely to develop in:  · Women.  · People who have a family history of the disease.  · People who have poor nutrition.  · People who are obese.  What are the signs or symptoms?  Early symptoms of this condition include:  · Fatigue.  · Weakness.  · Abdominal discomfort on the upper right side.  Symptoms of moderate disease include:  · Fever.  · Yellow, pale, or darkening skin.  · Abdominal pain.  · Nausea and vomiting.  · Weight loss.  · Loss of appetite.  Symptoms of advanced disease include:  · Abdominal swelling.  · Nosebleeds or bleeding gums.  · Itchy skin.  · Enlarged fingertips (clubbing).  · Light-colored stools that smell very bad (steatorrhea).  · Mood changes.  · Feeling agitated.  · Confusion.  · Trouble concentrating.  Some people do not have symptoms until the condition becomes severe. Symptoms are often worse after heavy drinking.  How is this diagnosed?  This condition is diagnosed with:  · A physical exam.  · Blood tests.  · Taking a sample of liver tissue to examine under a microscope (liver biopsy).  You may also have other tests,  including:  · X-rays.  · Ultrasound.  How is this treated?  Treatment may include:  · Stopping alcohol use to allow the liver to heal.  · Joining a support group or meeting with a counselor.  · Medicines to reduce inflammation. These may be recommended if the disease is severe.  · A liver transplant. This is the only treatment if the disease is very severe.  · Nutritional therapy. This may involve:  ¨ Taking vitamins.  ¨ Eating foods that are high in thiamine, such as whole-wheat cereals, pork, and raw vegetables.  ¨ Eating foods that have a lot of folic acid, such as vegetables, fruits, meats, beans, nuts, and dairy foods.  ¨ Eating a diet that includes carbohydrate-rich foods, such as yogurt, beans, potatoes, and rice.  Follow these instructions at home:  · Do not drink alcohol.  · Take medicines only as directed by your health care provider.  · Take vitamins only as directed by your health care provider.  · Follow any diet instructions that are given to you by your health care provider.  Contact a health care provider if:  · You have a fever.  · You have shortness of breath or have difficulty breathing.  · You have bright red blood in your stool, or you have black, tarry stools.  · You are vomiting blood.  · Your skin color becomes more yellow, pale, or dark.  · You develop headaches.  · You have trouble thinking.  · You have problems balancing or walking.  This information is not intended to replace advice given to you by your health care provider. Make sure you discuss any questions you have with your health care provider.  Document Released: 01/08/2016 Document Revised: 05/25/2017 Document Reviewed: 11/19/2015  © 2017 Elsevier      Ascites  Ascites is a collection of excess fluid in the abdomen. Ascites can range from mild to severe. It can get worse without treatment.  What are the causes?  Possible causes include:  · Cirrhosis. This is the most common cause of ascites.  · Infection or inflammation in the  abdomen.  · Cancer in the abdomen.  · Heart failure.  · Kidney disease.  · Inflammation of the pancreas.  · Clots in the veins of the liver.  What are the signs or symptoms?  Signs and symptoms may include:  · A feeling of fullness in your abdomen. This is common.  · An increase in the size of your abdomen or your waist.  · Swelling in your legs.  · Swelling of the scrotum in men.  · Difficulty breathing.  · Abdominal pain.  · Sudden weight gain.  If the condition is mild, you may not have symptoms.  How is this diagnosed?  To make a diagnosis, your health care provider will:  · Ask about your medical history.  · Perform a physical exam.  · Order imaging tests, such as an ultrasound or CT scan of your abdomen.  How is this treated?  Treatment depends on the cause of the ascites. It may include:  · Taking a pill to make you urinate. This is called a water pill (diuretic pill).  · Strictly reducing your salt (sodium) intake. Salt can cause extra fluid to be kept in the body, and this makes ascites worse.  · Having a procedure to remove fluid from your abdomen (paracentesis).  · Having a procedure to transfer fluid from your abdomen into a vein.  · Having a procedure that connects two of the major veins within your liver and relieves pressure on your liver (TIPS procedure).  Ascites may go away or improve with treatment of the condition that caused it.  Follow these instructions at home:  · Keep track of your weight. To do this, weigh yourself at the same time every day and record your weight.  · Keep track of how much you drink and any changes in the amount you urinate.  · Follow any instructions that your health care provider gives you about how much to drink.  · Try not to eat salty (high-sodium) foods.  · Take medicines only as directed by your health care provider.  · Keep all follow-up visits as directed by your health care provider. This is important.  · Report any changes in your health to your health care  provider, especially if you develop new symptoms or your symptoms get worse.  Contact a health care provider if:  · Your gain more than 3 pounds in 3 days.  · Your abdominal size or your waist size increases.  · You have new swelling in your legs.  · The swelling in your legs gets worse.  Get help right away if:  · You develop a fever.  · You develop confusion.  · You develop new or worsening difficulty breathing.  · You develop new or worsening abdominal pain.  · You develop new or worsening swelling in the scrotum (in men).  This information is not intended to replace advice given to you by your health care provider. Make sure you discuss any questions you have with your health care provider.  Document Released: 12/18/2006 Document Revised: 04/26/2017 Document Reviewed: 07/17/2015  [employer] Interactive Patient Education © 2017 [employer]    Cod Liver Oil oral capsules  What is this medicine?  COD LIVER OIL (kod GILDA er oil) is a dietary supplement. It contains omega-3 fatty acids, vitamin A, and vitamin D. Some products may have other nutrients. This product is not approved by the FDA for any medical uses. This product is not intended to diagnose, treat, cure, or prevent any disease.  This supplement may be used for other purposes; ask your health care provider or pharmacist if you have questions.  This medicine may be used for other purposes; ask your health care provider or pharmacist if you have questions.  What should I tell my health care provider before I take this medicine?  They need to know if you have any of these conditions:  -diabetes  -high blood pressure  -high cholesterol  -if you frequently drink alcohol containing drinks  -kidney disease  -liver disease  -stomach problems  -an unusual or allergic reaction to cod liver oil, fish, other medicines, foods, dyes, or preservatives  -pregnant or trying to get pregnant  -breast-feeding  How should I use this medicine?  Take this medicine by mouth with a  glass of water. You can take it with or without food. Follow the directions on the product label or talk to your health care provider. Do not take more than the recommended amount.  Talk to your pediatrician regarding the use of this medicine in children. Special care may be needed.  Overdosage: If you think you have taken too much of this medicine contact a poison control center or emergency room at once.  NOTE: This medicine is only for you. Do not share this medicine with others.  What if I miss a dose?  If you miss a dose, take it as soon as you can. If it is almost time for your next dose, take only that dose. Do not take double or extra doses.  What may interact with this medicine?  -additional vitamin A or vitamin D  -cholestyramine  -colestipol  -medicines for kidney disease or promoting bone health, like calcitriol or doxercalciferol  -medicines that treat or prevent blood clots like warfarin  -orlistat  This list may not describe all possible interactions. Give your health care provider a list of all the medicines, herbs, non-prescription drugs, or dietary supplements you use. Also tell them if you smoke, drink alcohol, or use illegal drugs. Some items may interact with your medicine.  What should I watch for while using this medicine?  See your doctor regularly. Tell all of your healthcare providers that you are taking this supplement.  What side effects may I notice from receiving this medicine?  Side effects that you should report to your doctor or health care professional as soon as possible:  -allergic reactions like skin rash, itching or hives, swelling of the face, lips, or tongue  -changes in vision  -dark urine  -skin rash  -unusual bleeding or bruising  -yellowing of the eyes or skin  Side effects that usually do not require medical attention (report to your doctor or health care professional if they continue or are bothersome):  -bad breath  -dry skin  -headache  -metallic taste  -stomach  upset  This list may not describe all possible side effects. Call your doctor for medical advice about side effects. You may report side effects to FDA at 6-741-FIW-1057.  Where should I keep my medicine?  Keep out of the reach of children.  Store at room temperature. Follow the product label. Protect from moisture. Throw away any unused medicine after the expiration date.  NOTE: This sheet is a summary. It may not cover all possible information. If you have questions about this medicine, talk to your doctor, pharmacist, or health care provider.  © 2018 Elsevier/[employer] (2017-01-19 10:51:21)

## 2018-03-29 NOTE — CARE PLAN
Problem: Safety  Goal: Will remain free from injury  Outcome: PROGRESSING AS EXPECTED  Continue to keep in the lowest position, room near nurses station.

## 2018-03-29 NOTE — PROGRESS NOTES
Patient resting at bedside shift report. Appears clean, covered and comfortable. Nonskid socks on. Pending labs today for possible discharge. Will continue to monitor.

## 2018-03-29 NOTE — DISCHARGE SUMMARY
"CHIEF COMPLAINT ON ADMISSION  Chief Complaint   Patient presents with   • Hypotension     pt was at [facility] and was sent over here \"because of my vitals\" pt states she does not feel lightheaded, but says she feels weak   • Other     \"they also are concerned for my liver\"       CODE STATUS  Prior    HPI & HOSPITAL COURSE  This is a 52 y.o. female who was admitted on 3/20/18 after presenting to ER  For Hypotension. She went to [facility] to enroll in alcohol withdrawal program. Her vital signs were taken and she was found to be hypotensive, she was sent to the ER for evaluation  Here in Er she was found to have elevated LFT due to her liver cirrhosis and CT abdomen was done , which showed the liver cirrhosis and an incidental finding of right ovarian cyst. A gyn/pelvis ultrasound was done showing  5.5 x 8.0 x 4.0 cm right ovarian cystic lesion. Her  was also found to be elevated  Follow-up and GYN consultation is recommended. Gynecology was consulted and pt was seen by Dr. Alcantara, recommended a possible paracentesis to see if there are malignant cell on on her ascites and she can follow as outpt with gynecology. We tried a paracentesis, but there is no ascites and not much fluid to safely do a paracentesis. Pt will need to follow up as outpt with Gynecology regarding her right ovarian cyst.   Regarding her hypotension, most likely related to her liver cirrhosis she was started on midodrine and her BP has improved.  She will need to follow up with her PCP and her GI as outpt  Pt will be discharged home today     The patient met 2-midnight criteria for an inpatient stay at the time of discharge.    DISCHARGE PROBLEM LIST  Principal Problem:    Hypotension POA: Yes  Active Problems:    Hyponatremia POA: Yes    Alcoholic cirrhosis (CMS-HCC) POA: Yes    Macrocytic anemia POA: Yes    Alcoholism (CMS-HCC) POA: Yes    Thrombocytopenia (CMS-HCC) POA: Yes    Coagulopathy (CMS-HCC) POA: Yes    Ovarian mass, right POA: " Yes    Melena POA: Yes    Hypophosphatemia POA: Unknown    Hypomagnesemia POA: Unknown      FOLLOW UP  No future appointments.  [facility]  [address] [facility] [address]  [place] [place] [address]  [phone]        Bety Flores M.D.  [address]  [address]  [facility] [id number] [phone]      please call to schedule your follow up appointment,  could not get through, thank you     Ernie Alcantara M.D.  [address]  [facility] [id number] [phone]    Schedule an appointment as soon as possible for a visit in 1 week  Follow-up appointment      MEDICATIONS ON DISCHARGE   Felicia Machado   Home Medication Instructions ROLO:[id number]    Printed on:03/29/18 4379   Medication Information                      diazepam (VALIUM) 10 MG tablet  Take 10 mg by mouth Once. Given by [facility]             FLUoxetine (PROZAC) 20 MG Cap  Take 20 mg by mouth every day.             furosemide (LASIX) 20 MG Tab  Take 40 mg by mouth every day.             levothyroxine (SYNTHROID) 25 MCG Tab  Take 25 mcg by mouth Every morning on an empty stomach.             midodrine (PROAMATINE) 10 MG tablet  Take 1 Tab by mouth 3 times a day, with meals.             omeprazole (PRILOSEC) 40 MG delayed-release capsule  Take 40 mg by mouth every day.             ondansetron (ZOFRAN ODT) 4 MG TABLET DISPERSIBLE  Take 4 mg by mouth Once. Given by [facility]             predniSONE (DELTASONE) 10 MG Tab  Take 1 Tab by mouth every day for 12 days. Take 40 mg for 3 days then 30 mg for 3 days then 20 mg for 3 days then 10 mg for 3 days             propranolol (INDERAL) 20 MG Tab  Take 20 mg by mouth Once. Given by [facility]             spironolactone (ALDACTONE) 100 MG Tab  Take 100 mg by mouth every day.                 DIET  No orders of the defined types were placed in this encounter.      ACTIVITY  As tolerated.      CONSULTATIONS  Gynecology: Dr. Alcantara     PROCEDURES  none    LABORATORY  Lab Results   Component Value Date/Time    SODIUM 139 03/29/2018 03:59  AM    POTASSIUM 3.9 03/29/2018 03:59 AM    CHLORIDE 105 03/29/2018 03:59 AM    CO2 25 03/29/2018 03:59 AM    GLUCOSE 116 (H) 03/29/2018 03:59 AM    BUN 15 03/29/2018 03:59 AM    CREATININE 0.45 (L) 03/29/2018 03:59 AM        Lab Results   Component Value Date/Time    WBC 6.5 03/23/2018 03:08 AM    HEMOGLOBIN 10.3 (L) 03/23/2018 03:08 AM    HEMATOCRIT 29.1 (L) 03/23/2018 03:08 AM    PLATELETCT 156 (L) 03/23/2018 03:08 AM        Total time of the discharge process exceeds 40 minutes

## 2018-03-29 NOTE — CARE PLAN
Problem: Safety  Goal: Will remain free from falls  Outcome: PROGRESSING AS EXPECTED  Ambulates ad zayda    Problem: Bowel/Gastric:  Goal: Normal bowel function is maintained or improved  Outcome: PROGRESSING AS EXPECTED

## 2018-03-29 NOTE — CARE PLAN
Problem: Communication  Goal: The ability to communicate needs accurately and effectively will improve  Outcome: PROGRESSING AS EXPECTED  Pt. Able to use call light appropriately. Enc'd to call with any needs.

## 2018-03-29 NOTE — PROGRESS NOTES
Patient discharge education complete. She states understanding that she needs to follow up with gynecology regarding the uterine mass and states she already has an appointment with her PCP. She verbalizes understanding to quit drinking alcohol. Ambulates ad zayda, states will follow up about her lab levels. No Iv. Telemetry monitor removed. States understanding of all discharge instructions. Would like WC chair ride down to [facility] so she doesn't get lost, and she will request that the  call a cab from there.

## 2018-08-26 PROBLEM — K70.31 ASCITES DUE TO ALCOHOLIC CIRRHOSIS (HCC): Status: ACTIVE | Noted: 2018-01-01

## 2018-08-27 PROBLEM — I85.10 ESOPHAGEAL VARICES IN ALCOHOLIC CIRRHOSIS (HCC): Status: ACTIVE | Noted: 2018-01-01

## 2018-08-27 PROBLEM — K70.30 ESOPHAGEAL VARICES IN ALCOHOLIC CIRRHOSIS (HCC): Status: ACTIVE | Noted: 2018-01-01

## 2018-08-27 PROBLEM — K70.11 ALCOHOLIC HEPATITIS WITH ASCITES: Status: ACTIVE | Noted: 2018-01-01

## 2018-08-27 PROBLEM — N83.209 OVARIAN CYST: Status: ACTIVE | Noted: 2018-01-01

## 2018-08-27 NOTE — CARE PLAN
Problem: Fluid Volume:  Goal: Will maintain balanced intake and output  Outcome: PROGRESSING AS EXPECTED  Pt was educated on 1800ml fluid restriction for the day and expressed understanding.

## 2018-08-27 NOTE — ASSESSMENT & PLAN NOTE
-Improved T.bili from 11 to 5.5 s/p IV steroids.  -Continue with PO steroids x 1 month and follow up with PCP with repeat labs.

## 2018-08-27 NOTE — SENIOR ADMIT NOTE
" Senior Admission Note    In summary: Felicia Machado is a 53 y.o. female with past medical history of cirrhosis secondary to alcohol abuse who presented to the ER with abdominal distension. Patient first noticed her abdominal distension about 6 months ago and this has been progressively worsening. She reports having right upper quadrant pain and early satiety and has noticed more \"little veins\" on her body.     Patient currently still drinks.     Father passed away from pancreatic cancer at 67.    Maddreys 61.7  MELD 30      Pertinent physical exam findings:    HEENT: Scleral icterus, poor dentition  CVS:RRR, no murmur  RESP: CTAB, no wheezes  ABD: hard, distended, fluid wave, shifting dullness, BS+, liver not palpable    Pertinent studies: Coagulopathy, Elevated T.Bili       Assessment and plan in summary:    Alcoholic liver cirrhosis  Coagulopathy  Hyponatremia  Hypotension  -Paracentesis performed in the ED, samples sent for analysis  -US this AM for possible second paracentesis  -Patient counseled on alcohol cessation  -Patient started on glucocorticoid therapy  -NS  -Fluid restriction  -Urine electrolytes and Osms ordered      For full plan, please see Intern note for details   Rick Wood M.D.  PGY 2                 "

## 2018-08-27 NOTE — ASSESSMENT & PLAN NOTE
- She has h/o UGIB with variceal banding  - pt is currently stable  - FOBT positive  - continue propranolol 10mg BID  -she has an appointment with gastroenterologist on 09/11/2018

## 2018-08-27 NOTE — ASSESSMENT & PLAN NOTE
- MDF - 63.3 - poor prognosis  - s/p steroids.  -Improved T.bili.  -discharge on PO steroids and will need to check response to steroids through PCP/GI.

## 2018-08-27 NOTE — ASSESSMENT & PLAN NOTE
- Cystic lesion in right adnexa now appears more rounded and measures 5.2 cm in maximum diameter per radiologist read in   07/18, possible cystadenoma / cystadenocarcinoma.   - Sized has decreased from 7 to 5 cm  - SAAG </= 1.1, unlikely due to ovarian cancer, rather portal HTN  - Ascitic fluid total protein  < 1  - f/u with gynaecologist

## 2018-08-27 NOTE — PROGRESS NOTES
Patient arrived to unit. Assumed care. A/ox4. Patient walked to restroom without assistance. Steady gait noted. Patient refused bed alarm despite education. Educated on fall prevention and safety precautions.   Scheduled medication administered. Patient on NS at 75ml/hr. IV on RAC flushing fine. Patient made comfortable.

## 2018-08-27 NOTE — ASSESSMENT & PLAN NOTE
- Pt is a chronic consumer of alcohol  - Has been counseled about risks versus benefits of cessation and options regarding cessation (detox, rehab, AA)  - Consumes ~2 alcoholic beverages per day (beer)  - Discussed her risk of mortality secondary to alcohol cirrhosis  - Pt is motivated to quit

## 2018-08-27 NOTE — CARE PLAN
Problem: Urinary Elimination:  Goal: Ability to reestablish a normal urinary elimination pattern will improve  Outcome: PROGRESSING AS EXPECTED  Pt walks self to bathroom to void. Pt voids without issue.

## 2018-08-27 NOTE — CARE PLAN
Problem: Safety  Goal: Will remain free from injury  Outcome: PROGRESSING AS EXPECTED  Pt is up self and walks with a steady gait.

## 2018-08-27 NOTE — H&P
Internal Medicine Admitting History and Physical    Note Author: Heriberto Ferrer M.D.       Name Felicia Machado 1965   Age/Sex 53 y.o. female   MRN [id number]   Code Status Full     After 5PM or if no immediate response to page, please call for cross-coverage  Attending/Team: Dr Kumar / Alex See Patient List for primary contact information  Call [phone] to page    1st Call - Day Intern (R1):   Dr Eubanks 2nd Call - Day Sr. Resident (R2/R3):   Dr Navarro       Chief Complaint:   Abdominal swelling and pain x 1 month    HPI:  Pt is a pleasant 53 yr old female with PMH of alcoholic liver disease with cirrhosis who has presented to the ER today with c/o increasing swelling, tenseness and pain in her abdomen. She has noticed an  her swelling which though present for the past six months has recently increased in size with accompanying right upper quadrant abdominal pain. She endorses a hx of vomiting, one episode of which included content of santiago blood recently, of moderate amount. She did not feel dizzy or pass out from the episode though she notices that her mobility has reduced in general. She reports no melena / hematochezia or excessive bruising but has noticed new onset reddish spots on her arms and legs.  She has also noticed recent weight gain to coincide with the swelling. She continues to drink approximately 2 alcoholic drinks a day, usually beers.  She has noticed swelling of her legs for which she typically takes a water pill, but hasn't been taking her medications (Midodrine, Lasix, Spironolactone, Propranolol) for the past 2 weeks due to her nausea and vomiting.  She does not report increased sleep, changing of her sleep wake cycle or loss of consciousness.  She denies fever, chills, myalgias, hx of IV drug use, headache, palpitations, seizures or falls.      Review of Systems   Constitutional: Positive for malaise/fatigue. Negative for chills, diaphoresis, fever and weight loss.  "  HENT: Negative for congestion, nosebleeds, sinus pain and sore throat.    Eyes: Negative for blurred vision, double vision and photophobia.   Respiratory: Negative for cough, hemoptysis, sputum production, shortness of breath and wheezing.    Cardiovascular: Positive for leg swelling. Negative for chest pain, palpitations, orthopnea and PND.   Gastrointestinal: Positive for abdominal pain and nausea. Negative for blood in stool, constipation, diarrhea and melena.   Genitourinary: Negative for dysuria, flank pain, frequency and urgency.   Musculoskeletal: Negative for back pain, falls, joint pain and myalgias.   Skin: Positive for rash. Negative for itching.   Neurological: Negative for dizziness, focal weakness, seizures, loss of consciousness, weakness and headaches.   Endo/Heme/Allergies: Negative for environmental allergies and polydipsia. Does not bruise/bleed easily.   Psychiatric/Behavioral: Positive for substance abuse. Negative for depression, hallucinations and suicidal ideas. The patient does not have insomnia.              Past Medical History (Chronic medical problem, known complications and current treatment)    Alcohol induced liver cirrhosis  No hx of DM, HTN, CAD    Past Surgical History:  History reviewed. No pertinent surgical history.    Current Outpatient Medications:  Home Medications     Reviewed by Cuca Crawford R.N. (Registered Nurse) on 08/26/18 at 2356  Med List Status: Complete   Medication Last Dose Status   FLUoxetine (PROZAC) 20 MG Cap 2 weeks Active   furosemide (LASIX) 20 MG Tab 2 weeks Active   levothyroxine (SYNTHROID) 25 MCG Tab 2 weeks Active   omeprazole (PRILOSEC) 40 MG delayed-release capsule 2 weeks Active   spironolactone (ALDACTONE) 100 MG Tab 2 weeks Active                Medication Allergy/Sensitivities:  Allergies   Allergen Reactions   • Nyquil Unspecified     Per pt body becomes rigid, intolerant    • Pcn [Penicillins] Rash      \"patient stated she can tolerate " "Amoxicillin\"          Family History (mandatory)   Hx of pancreatic cancer in father,  aged 67  Alzheimer's disease in mother      Social History (mandatory)   Social History     Social History   • Marital status: Single     Spouse name: N/A   • Number of children: N/A   • Years of education: N/A     Occupational History   • Not on file.     Social History Main Topics   • Smoking status: Never Smoker   • Smokeless tobacco: Never Used   • Alcohol use Yes      Comment: 2 beers   • Drug use: No   • Sexual activity: Not on file     Other Topics Concern   • Not on file     Social History Narrative   • No narrative on file     Living situation: lives with   PCP : Bety Flores M.D.    Physical Exam     Vitals:    18 2230 18 2300 18 2339 18 0300   BP:   101/62 (!) 99/55   Pulse: (!) 114 (!) 115 (!) 116 (!) 109   Resp: 16 16 18 18   Temp:   36.4 °C (97.6 °F) 37.1 °C (98.7 °F)   SpO2: 97% 96% 98% 92%   Weight:         Body mass index is 24.89 kg/m².  BP (!) 99/55   Pulse (!) 109   Temp 37.1 °C (98.7 °F)   Resp 18   Wt 65.8 kg (145 lb)   SpO2 92%   Breastfeeding? No   BMI 24.89 kg/m²   O2 therapy: Pulse Oximetry: 92 %, O2 (LPM): 0, O2 Delivery: None (Room Air)    Physical Exam   Constitutional: She is oriented to person, place, and time and well-developed, well-nourished, and in no distress. No distress.   HENT:   Head: Normocephalic and atraumatic.   Right Ear: External ear normal.   Left Ear: External ear normal.   Nose: Nose normal.   Mouth/Throat: Oropharynx is clear and moist. No oropharyngeal exudate.   Odor to breath, possible fetor hepaticus   Eyes: Conjunctivae and EOM are normal. Right eye exhibits no discharge. Left eye exhibits no discharge. No scleral icterus.   Neck: Normal range of motion. Neck supple. No JVD present.   Cardiovascular: Normal rate, regular rhythm, normal heart sounds and intact distal pulses.  Exam reveals no gallop and no friction rub.    No murmur " heard.  Pulmonary/Chest: Effort normal and breath sounds normal. No respiratory distress. She has no wheezes. She has no rales. She exhibits no tenderness.   Abdominal: Soft. Bowel sounds are normal. She exhibits distension. She exhibits no mass. There is no tenderness. There is no rebound and no guarding.   Shifting dullness present, fluid thrill present   Musculoskeletal: Normal range of motion. She exhibits edema. She exhibits no tenderness or deformity.   B/l pedal edema    Lymphadenopathy:     She has no cervical adenopathy.   Neurological: She is alert and oriented to person, place, and time. She has normal reflexes. She exhibits normal muscle tone. GCS score is 15.   Negative for asterixis   Skin: Skin is warm and dry. Rash noted. She is not diaphoretic. No erythema. No pallor.   Spider nevi present, multiple erythematous spots over limbs, non blanching   Psychiatric: Mood, memory, affect and judgment normal.   Vitals reviewed.        Data Review       Old Records Request:   Deferred  Current Records review/summary: Completed    Lab Data Review:  Recent Results (from the past 24 hour(s))   CBC WITH DIFFERENTIAL    Collection Time: 08/26/18  6:31 PM   Result Value Ref Range    WBC 8.0 4.8 - 10.8 K/uL    RBC 3.60 (L) 4.20 - 5.40 M/uL    Hemoglobin 12.6 12.0 - 16.0 g/dL    Hematocrit 35.0 (L) 37.0 - 47.0 %    MCV 97.2 81.4 - 97.8 fL    MCH 35.0 (H) 27.0 - 33.0 pg    MCHC 36.0 (H) 33.6 - 35.0 g/dL    RDW 59.1 (H) 35.9 - 50.0 fL    Platelet Count 125 (L) 164 - 446 K/uL    MPV 11.0 9.0 - 12.9 fL    Neutrophils-Polys 74.20 (H) 44.00 - 72.00 %    Lymphocytes 16.40 (L) 22.00 - 41.00 %    Monocytes 7.40 0.00 - 13.40 %    Eosinophils 1.00 0.00 - 6.90 %    Basophils 0.60 0.00 - 1.80 %    Immature Granulocytes 0.40 0.00 - 0.90 %    Nucleated RBC 0.00 /100 WBC    Neutrophils (Absolute) 5.94 2.00 - 7.15 K/uL    Lymphs (Absolute) 1.31 1.00 - 4.80 K/uL    Monos (Absolute) 0.59 0.00 - 0.85 K/uL    Eos (Absolute) 0.08 0.00 -  0.51 K/uL    Baso (Absolute) 0.05 0.00 - 0.12 K/uL    Immature Granulocytes (abs) 0.03 0.00 - 0.11 K/uL    NRBC (Absolute) 0.00 K/uL   COMP METABOLIC PANEL    Collection Time: 08/26/18  6:31 PM   Result Value Ref Range    Sodium 122 (L) 135 - 145 mmol/L    Potassium 3.5 (L) 3.6 - 5.5 mmol/L    Chloride 91 (L) 96 - 112 mmol/L    Co2 20 20 - 33 mmol/L    Anion Gap 11.0 0.0 - 11.9    Glucose 90 65 - 99 mg/dL    Bun 6 (L) 8 - 22 mg/dL    Creatinine 0.46 (L) 0.50 - 1.40 mg/dL    Calcium 8.2 (L) 8.5 - 10.5 mg/dL    AST(SGOT) 78 (H) 12 - 45 U/L    ALT(SGPT) 23 2 - 50 U/L    Alkaline Phosphatase 145 (H) 30 - 99 U/L    Total Bilirubin 11.1 (H) 0.1 - 1.5 mg/dL    Albumin 2.1 (L) 3.2 - 4.9 g/dL    Total Protein 7.1 6.0 - 8.2 g/dL    Globulin 5.0 (H) 1.9 - 3.5 g/dL    A-G Ratio 0.4 g/dL   LIPASE    Collection Time: 08/26/18  6:31 PM   Result Value Ref Range    Lipase 8 (L) 11 - 82 U/L   LACTIC ACID    Collection Time: 08/26/18  6:31 PM   Result Value Ref Range    Lactic Acid 3.1 (H) 0.5 - 2.0 mmol/L   PROTHROMBIN TIME (INR)    Collection Time: 08/26/18  6:31 PM   Result Value Ref Range    PT 24.0 (H) 12.0 - 14.6 sec    INR 2.19 (H) 0.87 - 1.13   APTT    Collection Time: 08/26/18  6:31 PM   Result Value Ref Range    APTT 50.5 (H) 24.7 - 36.0 sec   AMMONIA    Collection Time: 08/26/18  6:31 PM   Result Value Ref Range    Ammonia 72 (H) 11 - 45 umol/L   DIAGNOSTIC ALCOHOL    Collection Time: 08/26/18  6:31 PM   Result Value Ref Range    Diagnostic Alcohol 0.02 (H) 0.00 g/dL   ESTIMATED GFR    Collection Time: 08/26/18  6:31 PM   Result Value Ref Range    GFR If African American >60 >60 mL/min/1.73 m 2    GFR If Non African American >60 >60 mL/min/1.73 m 2   OSMOLALITY SERUM    Collection Time: 08/26/18  6:31 PM   Result Value Ref Range    Osmolality Serum 265 (L) 278 - 298 mOsm/kg H2O   URINALYSIS CULTURE, IF INDICATED    Collection Time: 08/26/18  7:46 PM   Result Value Ref Range    Micro Urine Req Microscopic     Color Orange      Character Cloudy (A)     Specific Gravity See comment <1.035   URINE MICROSCOPIC (W/UA)    Collection Time: 18  7:46 PM   Result Value Ref Range    WBC 2-5 /hpf    RBC 2-5 (A) /hpf    Bacteria Few (A) None /hpf    Epithelial Cells Few /hpf    Mucous Threads Few /hpf    Hyaline Cast >10 (A) /lpf   Fluid Cell Count w/Diff    Collection Time: 18  8:18 PM   Result Value Ref Range    Fluid Type Peritoneal     Color-Body Fluid Yellow     Character-Body Fluid Clear     Total RBC Count <1 cells/uL    Total  cells/uL    Polys 65 %    Lymphs 9 %    Mononuclear Cells - Fluid 15 %    Mesothelial Cells - CSF 4 %    Fluid Histiocyte 6 %    Fluid Basophil 1 %    Comments see below    Fluid Total Protein    Collection Time: 18  8:18 PM   Result Value Ref Range    Fluid Type Peritoneal     Total Protein Fluid 1.0 g/dL   Fluid Amylase (if suspected pancreatic leak)    Collection Time: 18  8:18 PM   Result Value Ref Range    Body Fluid Amylase <10 U/L   FLUID ALBUMIN    Collection Time: 18  8:18 PM   Result Value Ref Range    Fluid Type Peritoneal     Albumin <1.0 g/dL   GRAM STAIN    Collection Time: 18  8:18 PM   Result Value Ref Range    Significant Indicator .     Source BF     Site PERITONEAL FLUID     Gram Stain Result Rare WBCs.  No organisms seen.      EKG (NOW)    Collection Time: 18  9:29 PM   Result Value Ref Range    Report       [facility] Emergency Dept.    Test Date:  2018  Pt Name:    CHARLEEN RIDER            Department: ER  MRN:        [id number]                      Room:        16  Gender:     Female                       Technician: [id number]  :        1965                   Requested By:SONU FRANCE  Order #:    [id number]                    Reading MD:    Measurements  Intervals                                Axis  Rate:       109                          P:          47  MT:         127                          QRS:         8  QRSD:       104                          T:          11  QT:         352  QTc:        475    Interpretive Statements  SINUS TACHYCARDIA  Compared to ECG 11/16/2017 13:36:31  Sinus rhythm no longer present  T-wave abnormality no longer present         Imaging/Procedures Review:    Independant Imaging Review: Completed  US-ABDOMEN LIMITED   Final Result         1.  Large quantity of abdominal ascites throughout the 4 abdominal quadrants.      LE VENOUS DUPLEX (DVT)    (Results Pending)          Records reviewed and summarized in current documentation :  Yes  [employer] teaching service handout given to patient:  No         Assessment/Plan     * Ascites due to alcoholic cirrhosis (HCC)   Assessment & Plan    - tense ascites  - 7 liters ascitic fluid drained via paracentesis  - fluid sent for cell counts, culture, albumin, total protein    Plan  - Consider restarting Spironolactone, Lasix if BP trends WNL  - consider repeat paracentesis as clinically indicated        Coagulopathy (HCC)- (present on admission)   Assessment & Plan    - PT 24, INR 2.19, APTT 50.5  - probably 2/2 diagnosed liver cirrhosis  - multiple erythematous non blanching spots on skin of extremities, could represent superficial hemorrhaging  - b/l leg swelling but right more than left side  - to evaluate for DVT    Plan  - SCDs for DVT prophylaxis while inpatient  - b/l LE doppler        Alcoholic cirrhosis (HCC)- (present on admission)   Assessment & Plan    - negative asterixis, normal mental status, AOx4  - visible jaundice of sclera, spider nevi, fetor hepaticus  - tense ascites since 6 months, 7 liters of ascitic fluid drained via paracentesis in ER  - LFTS: AST 78 ALT 23 (ratio 2:1)  T. Bili 11.1 Alb 2.1  - Na 122 on admission  - Coagulopathy with possible DVT  - NH3 72  - 2/2 chronic alcohol use  - Pamela DF 66.3, estimated poor prognosis  - MELD score 30, >50% estimated 3 month mortality  - renal and pulmonary function WNL    Plan  - Admit  to medical floor for observation  - w/f hypotension post procedure  - Ascitic fluid for cell count, Alb, Gram stain, culture  - SCDs for DVT prophylaxis  - to consider restarting home meds Propranolol, Midodrine, Lasix, Spironolactone on discharge (pt not taking meds at home recently)   - Solumedrol 40 mg IV Qdaily        Hypotension- (present on admission)   Assessment & Plan    - Pt has chronically low BP per hx, has been previously started on Midodrine  - Has not been taking regular medication because of vomiting  - serum albumin 2.1 on admission, downtrending    Plan  - Albumin 25% 25 mg for intravascular volume support  - IVF as needed for hypovolemia  - Consider Midodrine PRN for hypotension        Hyponatremia- (present on admission)   Assessment & Plan    - Na chronically low, 122 on admission today  - s.Osm 265    Plan  - IVF NS 1000 ml infusion  - Urine Osm, urine lytes        Ovarian mass, right   Assessment & Plan    - Cystic lesion in right adnexa now appears more rounded and measures 5.2 cm in maximum diameter per radiologist read in   07/18, possible cystadenoma / cystadenocarcinoma  - possible contribution to ascites, though fluid extracted via paracentesis is yellow and clear        Thrombocytopenia (HCC)- (present on admission)   Assessment & Plan    - Plt count 125 on admission, no current superficial bleeding  - probably 2/2 splenic sequestration    Plan  - trend H/H, transfuse as necessary  - FOBT, to consider EGD to evaluate for varices        Alcoholism (HCC)- (present on admission)   Assessment & Plan    - Pt is a chronic consumer of alcohol  - Has been counseled about risks versus benefits of cessation and options regarding cessation (detox, rehab, AA)  - Consumes ~2 alcoholic beverages per day (beer)            Anticipated Hospital stay: Observation admit        Quality Measures  Quality-Core Measures   Reviewed items::  Labs reviewed and Medications reviewed  DVT prophylaxis  pharmacological::  Contraindicated - High bleeding risk  DVT prophylaxis - mechanical:  SCDs    PCP: Bety Flores M.D.

## 2018-08-27 NOTE — ED PROVIDER NOTES
ED Provider Note    Scribed for Honey Randle M.D. by Sheri Barraza. 8/26/2018  6:37 PM    Primary care provider: Bety Flores M.D.  Means of arrival: sai  History obtained from: patient  History limited by: none    CHIEF COMPLAINT  Chief Complaint   Patient presents with   • Abdominal Pain     Random episodes of ABD pain   • Abdominal Swelling     Started about 6 months ago, last 2 weeks had profound swelling       HPI  Felicia Machado is a 53 y.o. female with a history of cirrhosis who presents to the Emergency Department for evaluation of abdominal pain and swelling onset 6 months ago. She explains that she is having difficulty breathing and eating secondary to the fluid buildup. She additionally is complaining of vomiting, right sided leg swelling, and diarrhea. She confirms a cough and rash on her extremities at this time. Patient denies any fevers. Patient reports that the yellowing of her eyes is consistent with her baseline. She states that her cirrhosis is caused by alcohol abuse and she currently drinks a few beers daily. She denies any diabetes, CVA, MI. Patient reports that she sees Dr. Ac (GI). She does not smoke. Patient is not normally on oxygen at home.     REVIEW OF SYSTEMS  PULMONARY:  cough  GI: vomiting, diarrhea, abdominal swelling, and abdominal pain   Musculoskeletal: lower extremity edema  Endocrine: no fevers  SKIN: rash    See history of present illness. All other systems are negative. C.    PAST MEDICAL HISTORY   has a past medical history of Cirrhosis (CMS-HCC) (HCC) and ETOH abuse.    SURGICAL HISTORY  patient denies any surgical history    SOCIAL HISTORY  Social History   Substance Use Topics   • Smoking status: Never Smoker   • Smokeless tobacco: Never Used   • Alcohol use Yes      Comment: 1/5 whiskey a day      History   Drug Use No       FAMILY HISTORY  No family history on file.    CURRENT MEDICATIONS  Home Medications    **Home medications have not yet been reviewed  "for this encounter**         ALLERGIES  Allergies   Allergen Reactions   • Nyquil Unspecified     Per pt body becomes rigid, intolerant    • Pcn [Penicillins] Rash      \"patient stated she can tolerate Amoxicillin\"        PHYSICAL EXAM  VITAL SIGNS: BP (P) 104/70   Pulse (P) 100   Temp (P) 36.1 °C (97 °F)   Resp (P) 20   SpO2 (P) 92%     Constitutional: Well developed, Well nourished, No acute distress, Non-toxic appearance.   HEENT: Normocephalic, Atraumatic,  external ears normal, pharynx pink,  Mucous  Membranes moist, No rhinorrhea or mucosal edema  Eyes: PERRL, EOMI, Conjunctiva normal, No discharge.   Neck: Normal range of motion, No tenderness, Supple, No stridor.   Lymphatic: No lymphadenopathy    Cardiovascular: Regular Rate and Rhythm, No murmurs,  rubs, or gallops.   Thorax & Lungs: Lungs clear to auscultation bilaterally, No respiratory distress, No wheezes, rhales or rhonchi, No chest wall tenderness.   Abdomen: Bowel sounds normal, Soft, non tender, non distended,  No pulsatile masses., no rebound guarding or peritoneal signs.   Skin: Warm, Dry, No erythema, No rash,   Back:  No CVA tenderness,  No spinal tenderness, bony crepitance, step offs, or instability.   Neurologic: Alert & oriented x 3, Normal motor function, Normal sensory function, No focal deficits noted. Normal reflexes. Normal Cranial Nerves.  Extremities: Equal, intact distal pulses, No cyanosis, clubbing or edema,  No tenderness.   Musculoskeletal: Good range of motion in all major joints. No tenderness to palpation or major deformities noted.     DIAGNOSTIC STUDIES / PROCEDURES    LABS  {THISVISIT}  All labs reviewed by me.    EKG  12 lead EKG; Interpreted by emergency department physician    Rhythm: Normal Sinus Rhythm   Rate: ***  Axis: Normal  R Wave: Normal R wave progression  Normal ST-T segments  ST Segments: No ST segment elevation   T waves: Normal  Q waves: None    Clinical Impression: No acute changes    RADIOLOGY  No " orders to display     The radiologist's interpretation of all radiological studies have been reviewed by me.    COURSE & MEDICAL DECISION MAKING  Nursing notes, VS, PMSFHx reviewed in chart.    6:37 PM Patient seen and examined at bedside. Ordered fluid cell count, fluid total protein, fluid culture with gram stain, fluid amylase, US-abdomen, lactic acid, blood culture, urinalysis culture, PTT, APTT, ammonia, EKG, CBC, CMP, lipase, diagnostic alcohol estimated GFR, fluid albumin to evaluate her symptoms. The differential diagnoses include but are not limited to: ascites     7:00 PM Spoke with Dr. Wood who agrees to consult the patient and drain her abdomen.    7:26 PM Patient reevaluated at bedside. Informed that she will need to urinate prior to having the fluid drained from her abdomen. Explained that she will need to be admitted after the procedure is performed. Patient understands and agrees to be admitted.        FINAL IMPRESSION  No diagnosis found.      Sheri REINOSO (Scribe), am scribing for, and in the presence of, Honey Randle M.D..    Electronically signed by: Sheri Barraza (Scribe), 8/26/2018    Honey REINOSO M.D. personally performed the services described in this documentation, as scribed by Sheri Barraza in my presence, and it is both accurate and complete.    {ERP Attestation (ERP ONLY):200109}

## 2018-08-27 NOTE — ASSESSMENT & PLAN NOTE
- Plt count 125 on admission  - Not actively bleeding, petechiae like lesion on her legs  - FOBT positive  - Pending Vit C levels and discharge on Vitamin C.

## 2018-08-27 NOTE — ED PROVIDER NOTES
ED Provider Note    Scribed for Honey Randle M.D. by Sheri Barraza. 8/26/2018  6:37 PM    Primary care provider: Bety Flores M.D.  Means of arrival: sai  History obtained from: patient  History limited by: none    CHIEF COMPLAINT  Chief Complaint   Patient presents with   • Abdominal Pain     Random episodes of ABD pain   • Abdominal Swelling     Started about 6 months ago, last 2 weeks had profound swelling       HPI  Felicia Machado is a 53 y.o. female with a history of cirrhosis who presents to the Emergency Department for evaluation of abdominal pain and swelling onset 6 months ago. She explains that she is having difficulty breathing and eating secondary to the fluid buildup. She additionally is complaining of vomiting, right sided leg swelling, and diarrhea. She confirms a cough and rash on her extremities at this time. Patient denies any fevers. Patient reports that the yellowing of her eyes is consistent with her baseline. She states that her cirrhosis is caused by alcohol abuse and she currently drinks a few beers daily. She denies any diabetes, CVA, MI. Patient reports that she sees Dr. Ac (GI). She does not smoke. Patient is not normally on oxygen at home.     REVIEW OF SYSTEMS  PULMONARY:  cough  GI: vomiting, diarrhea, abdominal swelling, and abdominal pain   Musculoskeletal: lower extremity edema  Endocrine: no fevers  SKIN: rash    See history of present illness. All other systems are negative. C.    PAST MEDICAL HISTORY   has a past medical history of Cirrhosis (HCC) and ETOH abuse.    SURGICAL HISTORY  patient denies any surgical history    SOCIAL HISTORY  Social History   Substance Use Topics   • Smoking status: Never Smoker   • Smokeless tobacco: Never Used   • Alcohol use Yes      Comment: 2 beers      History   Drug Use No       FAMILY HISTORY  None noted.    CURRENT MEDICATIONS    Current Facility-Administered Medications:   •  senna-docusate (PERICOLACE or SENOKOT S) 8.6-50 MG  "per tablet 2 Tab, 2 Tab, Oral, BID **AND** polyethylene glycol/lytes (MIRALAX) PACKET 1 Packet, 1 Packet, Oral, QDAY PRN **AND** magnesium hydroxide (MILK OF MAGNESIA) suspension 30 mL, 30 mL, Oral, QDAY PRN **AND** bisacodyl (DULCOLAX) suppository 10 mg, 10 mg, Rectal, QDAY PRN, Heriberto Ferrer M.D.  •  enalaprilat (VASOTEC) injection 1.25 mg, 1.25 mg, Intravenous, Q6HRS PRN, Heriberto Ferrer M.D.  •  potassium chloride SA (Kdur) tablet 40 mEq, 40 mEq, Oral, Once, Heriberto Ferrer M.D.  •  [START ON 8/27/2018] levothyroxine (SYNTHROID) tablet 25 mcg, 25 mcg, Oral, AM ES, Heriberto Ferrer M.D.  •  [START ON 8/27/2018] omeprazole (PRILOSEC) capsule 40 mg, 40 mg, Oral, DAILY, Heriberto Ferrer M.D.  •  NS infusion, , Intravenous, Continuous, Heriberto Ferrer M.D., Last Rate: 75 mL/hr at 08/26/18 5933  •  methylPREDNISolone (SOLU-MEDROL) 40 MG injection 40 mg, 40 mg, Intravenous, DAILY, Heriberto Ferrer M.D.    Current Outpatient Prescriptions:   •  FLUoxetine (PROZAC) 20 MG Cap, Take 20 mg by mouth every day., Disp: , Rfl:   •  spironolactone (ALDACTONE) 100 MG Tab, Take 100 mg by mouth every day., Disp: , Rfl:   •  furosemide (LASIX) 20 MG Tab, Take 40 mg by mouth every day., Disp: , Rfl:   •  levothyroxine (SYNTHROID) 25 MCG Tab, Take 25 mcg by mouth Every morning on an empty stomach., Disp: , Rfl:   •  omeprazole (PRILOSEC) 40 MG delayed-release capsule, Take 40 mg by mouth every day., Disp: , Rfl:     ALLERGIES  Allergies   Allergen Reactions   • Nyquil Unspecified     Per pt body becomes rigid, intolerant    • Pcn [Penicillins] Rash      \"patient stated she can tolerate Amoxicillin\"        PHYSICAL EXAM  VITAL SIGNS: BP (P) 104/70   Pulse (P) 100   Temp (P) 36.1 °C (97 °F)   Resp (P) 20   SpO2 (P) 92%     Constitutional: Well developed, Well nourished, No acute distress, Chronically ill appearing  HEENT: Normocephalic, Atraumatic,  external ears normal, pharynx pink,  Mucous  Membranes moist, No rhinorrhea or " mucosal edema  Eyes: PERRL, EOMI, Conjunctiva normal, No discharge. Scleral icterus  Neck: Normal range of motion, No tenderness, Supple, No stridor.   Lymphatic: No lymphadenopathy    Cardiovascular: Regular Rate and Rhythm, No murmurs,  rubs, or gallops.   Thorax & Lungs: Lungs clear to auscultation bilaterally, No respiratory distress, No wheezes, rhales or rhonchi, No chest wall tenderness. Decreased breath sounds  Abdomen: Bowel sounds normal, Soft, tender, distended with fluid waves,  No pulsatile masses., no rebound guarding or peritoneal signs.   Skin: Warm, Dry, No erythema, Jaundice, liver spots on arms, petechia on legs, acne like lesions on face  Back:  No CVA tenderness,  No spinal tenderness, bony crepitance, step offs, or instability.   Neurologic: Alert & oriented x 3, Normal motor function, Normal sensory function, No focal deficits noted. Normal reflexes. Normal Cranial Nerves.  Extremities: Equal, intact distal pulses, No cyanosis, clubbing,  No tenderness. Bilateral edema of lower extremities with right worse than left.   Musculoskeletal: Good range of motion in all major joints. No tenderness to palpation or major deformities noted.     DIAGNOSTIC STUDIES / PROCEDURES    LABS  Results for orders placed or performed during the hospital encounter of 08/26/18   CBC WITH DIFFERENTIAL   Result Value Ref Range    WBC 8.0 4.8 - 10.8 K/uL    RBC 3.60 (L) 4.20 - 5.40 M/uL    Hemoglobin 12.6 12.0 - 16.0 g/dL    Hematocrit 35.0 (L) 37.0 - 47.0 %    MCV 97.2 81.4 - 97.8 fL    MCH 35.0 (H) 27.0 - 33.0 pg    MCHC 36.0 (H) 33.6 - 35.0 g/dL    RDW 59.1 (H) 35.9 - 50.0 fL    Platelet Count 125 (L) 164 - 446 K/uL    MPV 11.0 9.0 - 12.9 fL    Neutrophils-Polys 74.20 (H) 44.00 - 72.00 %    Lymphocytes 16.40 (L) 22.00 - 41.00 %    Monocytes 7.40 0.00 - 13.40 %    Eosinophils 1.00 0.00 - 6.90 %    Basophils 0.60 0.00 - 1.80 %    Immature Granulocytes 0.40 0.00 - 0.90 %    Nucleated RBC 0.00 /100 WBC    Neutrophils  (Absolute) 5.94 2.00 - 7.15 K/uL    Lymphs (Absolute) 1.31 1.00 - 4.80 K/uL    Monos (Absolute) 0.59 0.00 - 0.85 K/uL    Eos (Absolute) 0.08 0.00 - 0.51 K/uL    Baso (Absolute) 0.05 0.00 - 0.12 K/uL    Immature Granulocytes (abs) 0.03 0.00 - 0.11 K/uL    NRBC (Absolute) 0.00 K/uL   COMP METABOLIC PANEL   Result Value Ref Range    Sodium 122 (L) 135 - 145 mmol/L    Potassium 3.5 (L) 3.6 - 5.5 mmol/L    Chloride 91 (L) 96 - 112 mmol/L    Co2 20 20 - 33 mmol/L    Anion Gap 11.0 0.0 - 11.9    Glucose 90 65 - 99 mg/dL    Bun 6 (L) 8 - 22 mg/dL    Creatinine 0.46 (L) 0.50 - 1.40 mg/dL    Calcium 8.2 (L) 8.5 - 10.5 mg/dL    AST(SGOT) 78 (H) 12 - 45 U/L    ALT(SGPT) 23 2 - 50 U/L    Alkaline Phosphatase 145 (H) 30 - 99 U/L    Total Bilirubin 11.1 (H) 0.1 - 1.5 mg/dL    Albumin 2.1 (L) 3.2 - 4.9 g/dL    Total Protein 7.1 6.0 - 8.2 g/dL    Globulin 5.0 (H) 1.9 - 3.5 g/dL    A-G Ratio 0.4 g/dL   LIPASE   Result Value Ref Range    Lipase 8 (L) 11 - 82 U/L   LACTIC ACID   Result Value Ref Range    Lactic Acid 3.1 (H) 0.5 - 2.0 mmol/L   URINALYSIS CULTURE, IF INDICATED   Result Value Ref Range    Micro Urine Req Microscopic     Color Orange     Character Cloudy (A)     Specific Gravity See comment <1.035   PROTHROMBIN TIME (INR)   Result Value Ref Range    PT 24.0 (H) 12.0 - 14.6 sec    INR 2.19 (H) 0.87 - 1.13   APTT   Result Value Ref Range    APTT 50.5 (H) 24.7 - 36.0 sec   AMMONIA   Result Value Ref Range    Ammonia 72 (H) 11 - 45 umol/L   Fluid Cell Count w/Diff   Result Value Ref Range    Fluid Type Peritoneal     Color-Body Fluid Yellow     Character-Body Fluid Clear     Total RBC Count <1 cells/uL    Total  cells/uL    Polys 65 %    Lymphs 9 %    Mononuclear Cells - Fluid 15 %    Mesothelial Cells - CSF 4 %    Fluid Histiocyte 6 %    Fluid Basophil 1 %    Comments see below    Fluid Total Protein   Result Value Ref Range    Fluid Type Peritoneal     Total Protein Fluid 1.0 g/dL   Fluid Amylase (if suspected  pancreatic leak)   Result Value Ref Range    Body Fluid Amylase <10 U/L   DIAGNOSTIC ALCOHOL   Result Value Ref Range    Diagnostic Alcohol 0.02 (H) 0.00 g/dL   FLUID ALBUMIN   Result Value Ref Range    Fluid Type Peritoneal     Albumin <1.0 g/dL   ESTIMATED GFR   Result Value Ref Range    GFR If African American >60 >60 mL/min/1.73 m 2    GFR If Non African American >60 >60 mL/min/1.73 m 2   URINE MICROSCOPIC (W/UA)   Result Value Ref Range    WBC 2-5 /hpf    RBC 2-5 (A) /hpf    Bacteria Few (A) None /hpf    Epithelial Cells Few /hpf    Mucous Threads Few /hpf    Hyaline Cast >10 (A) /lpf   GRAM STAIN   Result Value Ref Range    Significant Indicator .     Source BF     Site PERITONEAL FLUID     Gram Stain Result Rare WBCs.  No organisms seen.      OSMOLALITY SERUM   Result Value Ref Range    Osmolality Serum 265 (L) 278 - 298 mOsm/kg H2O   EKG (NOW)   Result Value Ref Range    Report       [facility] Emergency Dept.    Test Date:  2018  Pt Name:    CHARLEEN RIDER            Department: ER  MRN:        [id number]                      Room:       [facility]  Gender:     Female                       Technician: [id number]  :        1965                   Requested By:SONU FRANCE  Order #:    [id number]                    Reading MD:    Measurements  Intervals                                Axis  Rate:       109                          P:          47  MA:         127                          QRS:        8  QRSD:       104                          T:          11  QT:         352  QTc:        475    Interpretive Statements  SINUS TACHYCARDIA  Compared to ECG 2017 13:36:31  Sinus rhythm no longer present  T-wave abnormality no longer present         All labs reviewed by me.    EKG  12 lead EKG; Interpreted by emergency department physician    Rhythm: Sinus tachycardia  Rate: 109  Axis: Normal  R Wave: Normal R wave progression  Normal ST-T segments  ST Segments: No ST segment elevation    T waves: Normal  Q waves: None    Clinical Impression: No acute changes, no STEMI    RADIOLOGY  US-ABDOMEN LIMITED   Final Result         1.  Large quantity of abdominal ascites throughout the 4 abdominal quadrants.      LE VENOUS DUPLEX (DVT)    (Results Pending)     The radiologist's interpretation of all radiological studies have been reviewed by me.    COURSE & MEDICAL DECISION MAKING  Nursing notes, VS, PMSFHx reviewed in chart.    6:37 PM Patient seen and examined at bedside.  Ordered fluid cell count, fluid total protein, fluid culture with gram stain, fluid amylase, US-abdomen, lactic acid, blood culture, PTT, APTT, ammonia, EKG, CBC, CMP, lipase, diagnostic alcohol, estimated GFR, fluid albumin to evaluate her symptoms. The differential diagnoses include but are not limited to: ascites, liver failure, SBP.    6:50 Paged [facility].    7:00 PM Spoke with Dr. Wood ([facility]) who agrees to consult and perform the procedure.    7:45 PM Paracentesis performed by Dr. Wood ([facility]).    DISPOSITION:  Patient will be admitted to Dr. Wood ([facility]) in guarded condition.    FINAL IMPRESSION  1. Alcoholic cirrhosis of liver with ascites (HCC)        Sheri REINOSO (Scribe), am scribing for, and in the presence of, Honey Randle M.D..    Electronically signed by: Sheri Barraza (Scribe), 8/26/2018    Honey REINOSO M.D. personally performed the services described in this documentation, as scribed by Sheri Barraza in my presence, and it is both accurate and complete.    The note accurately reflects work and decisions made by me.  Honey Randle  8/26/2018  11:13 PM

## 2018-08-27 NOTE — PROGRESS NOTES
Two RN skin assessment completed with RNLotus. Patient is jaundice, presents generalized petechiae, more pronounced on BLE, R middle back and bilateral cheeks. Dry feet noted. Otherwise skin is intact. No open wounds noted.

## 2018-08-27 NOTE — ASSESSMENT & PLAN NOTE
- Gradual onset of ascites formation for the past 6 months  - 7 liters ascitic fluid drained via paracentesis in the ER  - albumin replaced  - fluid analysis did not show features of SBP  - SAAG >/= 1.1 - therefore secondary to portal HTN  - ascitic fluid total protein (AFTP) is < 1     Plan:-  Continue with Lasix 40 mg and Spirinolactone 100 mg.

## 2018-08-27 NOTE — PROGRESS NOTES
Internal Medicine Interval Note  Note Author: Steffi Eubanks M.D.     Name Felicia Machado 1965   Age/Sex 53 y.o. female   MRN [id number]   Code Status Full     After 5PM or if no immediate response to page, please call for cross-coverage  Attending/Team: Dr. Kumar/Alex See Patient List for primary contact information  Call [phone] to page    1st Call - Day Intern (R1):   Dr. Eubanks 2nd Call - Day Sr. Resident (R2/R3):   Dr. Navarro         Reason for interval visit  (Principal Problem)   Decompensated alcoholic cirrhosis  Ascites  Hyponatremia      Interval Problem Daily Status Update  (24 hours, problem oriented, brief subjective history, new lab/imaging data pertinent to that problem)   Ms. Machado, a pleasant 53-year-old female with history of alcoholic liver cirrhosis presented with gradual increase in abdominal distention with mild right upper quadrant abdominal pain.  This was associated with nausea and intermittent vomiting for the last 2 weeks with decreased food intake.  Patient was on spironolactone, propranolol, midodrine, and Lasix but patient states that she stopped taking them 2 weeks ago due to nausea.  Patient also reported of mild shortness of breath due to increasing abdominal girth.  Patient denies any fever, chills, confusion, or other symptoms.  Patient states that she had an episode of hematemesis a few days ago.  Patient currently drinks about 2 bottles of beer per day.    On admission patient was found to have tense abdominal distention.  Patient underwent paracentesis with removal of 7 L of fluid and replacement with albumin.  Patient states that she feels much better after paracentesis.  She also reports of feeling hungry with a good appetite.    Evaluation of the labs show that she has hyponatremia with sodium of 122.  Her MDF score was 66, poor prognosis and may benefit from steroids.  Solu-Medrol started and will assess for response in 7 days with JONNATHAN  score.  Will slowly correct sodium and closely monitor patient for any alcohol withdrawal symptoms.  Patient counseled for complete cessation of alcohol due to her poor prognosis.  Patient understands and is willing to stop drinking.       Review of Systems   Constitutional: Negative for chills and fever.   HENT: Negative for congestion and sore throat.    Respiratory: Negative for cough and shortness of breath.    Cardiovascular: Negative for chest pain, orthopnea and leg swelling.   Gastrointestinal: Negative for abdominal pain, diarrhea, nausea and vomiting.   Genitourinary: Negative for dysuria and frequency.   Musculoskeletal: Negative for back pain.   Skin: Positive for rash.   Neurological: Negative for dizziness and headaches.   Psychiatric/Behavioral: The patient is not nervous/anxious and does not have insomnia.        Disposition/Barriers to discharge:   Home when medically cleared    Consultants/Specialty  PCP: Bety Flores M.D.      Quality Measures  Quality-Core Measures   Reviewed items::  Labs reviewed, Medications reviewed and Radiology images reviewed  Rhodes catheter::  No Rhodes  DVT prophylaxis pharmacological::  Contraindicated - High bleeding risk  DVT prophylaxis - mechanical:  SCDs  Ulcer Prophylaxis::  Yes      Physical Exam       Vitals:    08/26/18 2339 08/27/18 0300 08/27/18 0900 08/27/18 1600   BP: 101/62 (!) 99/55 (!) 91/59 100/63   Pulse: (!) 116 (!) 109 (!) 104 81   Resp: 18 18 18 20   Temp: 36.4 °C (97.6 °F) 37.1 °C (98.7 °F) 36.3 °C (97.4 °F) 36.1 °C (97 °F)   SpO2: 98% 92% 98% 95%   Weight:         Body mass index is 24.89 kg/m². Weight: 65.8 kg (145 lb)  Oxygen Therapy:  Pulse Oximetry: 95 %, O2 (LPM): 0, O2 Delivery: None (Room Air)    Physical Exam   Constitutional: She is oriented to person, place, and time and well-developed, well-nourished, and in no distress.   HENT:   Head: Normocephalic and atraumatic.   Eyes: Pupils are equal, round, and reactive to light. Conjunctivae  and EOM are normal.   Neck: Normal range of motion. Neck supple. No thyromegaly present.   Cardiovascular: Normal rate, regular rhythm and normal heart sounds.    No murmur heard.  Pulmonary/Chest: Effort normal and breath sounds normal. No respiratory distress. She has no wheezes.   Abdominal: Soft. Bowel sounds are normal. She exhibits distension. She exhibits no mass. There is no tenderness.   Musculoskeletal: Normal range of motion. She exhibits edema (1+).   Lymphadenopathy:     She has no cervical adenopathy.   Neurological: She is alert and oriented to person, place, and time. Gait normal. GCS score is 15.   Skin: Skin is warm and dry. Rash (petechia like lesion seen on BL lower extremities) noted. No erythema.   Psychiatric: Mood and affect normal.       Assessment/Plan     * Decompensated ascites secondary to alcoholic cirrhosis and alcoholic hepatitis (HCC)   Assessment & Plan    - Gradual onset of ascites formation for the past 6 months  - 7 liters ascitic fluid drained via paracentesis in the ER  - 50mg of albumin replaced  - fluid analysis did not show features of SBP  - SAAG >/= 1.1 - therefore secondary to portal HTN  - ascitic fluid total protein (AFTP) is < 1 - therefore increased risk of SBP  - Hyponatremia, Na 123  - restart spironolactone 100mg daily  - avoid NSAIDs        Alcoholic hepatitis with ascites   Assessment & Plan    - MDF - 63.3 - poor prognosis  - Solumedrol started on 8/27/2018  - will check Lille score on 9/2/2018 for response to glucocorticoids        Esophageal varices in alcoholic cirrhosis (HCC)   Assessment & Plan    - Pt states that she has one episode of hemetemesis few days ago  - She has h/o variceal banding  - pt is currently stable  - FOBT  - Will monitor for any acute bleeding  - Start propranolol 10mg BID        Ovarian cyst- (present on admission)   Assessment & Plan    - Cystic lesion in right adnexa now appears more rounded and measures 5.2 cm in maximum diameter  per radiologist read in   07/18, possible cystadenoma / cystadenocarcinoma.   - Sized has decreased from 7 to 5 cm  - SAAG </= 1.1, unlikely due to ovarian cancer, rather portal HTN  - Ascitic fluid total protein  < 1  - f/U with gynaecologist        Coagulopathy (HCC)- (present on admission)   Assessment & Plan    - PT 24, INR 2.19, APTT 50.5  - Most likely due to liver cirrhosis  - multiple erythematous non blanching spots on skin of extremities  - Bilateral pitting edema 1+  - Teg study  - SCDs for DVT prophylaxis   - will consider starting Lovenox based on teg study results        Alcoholic cirrhosis (HCC)- (present on admission)   Assessment & Plan    - negative asterixis, normal mental status, AOx4  - Scleral icterus, spider nevi, fetor hepaticus  - Complicated by portal hypertension resulting in ascites and esophageal varices  - Hepatic encephalopathy - Grade 0,  Pt has atleast 1 BM/day, no h/o constipation  - Maddrey DF 66.3, estimated 3 month mortality is 52.6%, poor prognosis  - MELD score 30, Poor prognosis  - CPS score 11 - 45% 1 year mortality  - renal and pulmonary function WNL  - Counseled patient to abstain from alcohol          Hypotension   Assessment & Plan    - Pt has chronically low BP per hx, has been previously started on Midodrine  - Has not been taking regular medication because of vomiting  - serum albumin 2.1 on admission, downtrending    Plan  - Albumin 25% 25 mg for intravascular volume support  - IVF as needed for hypovolemia  - Consider Midodrine PRN for hypotension        Hyponatremia- (present on admission)   Assessment & Plan    - Na low 122 on admission  - serum osm 265  - urine osm = 149  - urine Na = < 10 , Urine Cr = 47.6  - Pt is hypovolemic  - Will give albumin 25mg  - continue 0.9% NS 50mls/hr  - BMP Q6H  - Restrict intake of free water            Thrombocytopenia (HCC)- (present on admission)   Assessment & Plan    - Plt count 125 on admission  - Not actively bleeding,  petechiae like lesion on her legs  - Check vitamin C level  - FOBT to check for gi bleed  - continue to monitor        Alcoholism (HCC)- (present on admission)   Assessment & Plan    - Pt is a chronic consumer of alcohol  - Has been counseled about risks versus benefits of cessation and options regarding cessation (detox, rehab, AA)  - Consumes ~2 alcoholic beverages per day (beer)  - Discussed her risk of mortality secondary to alcohol cirrhosis  - Pt is motivated to quit

## 2018-08-27 NOTE — ASSESSMENT & PLAN NOTE
- Pt has chronically low BP per hx, has been previously started on Midodrine  - Has not been taking regular medication because of vomiting  - serum albumin 2.1 on admission, downtrending    Plan  - Albumin 25% 25 mg for intravascular volume support  - IVF as needed for hypovolemia  - Consider Midodrine PRN for hypotension

## 2018-08-27 NOTE — PROCEDURES
Procedure Note    Date: 8/26/2018  Time: 20:10    Procedure: Paracentesis - diagnostic and therapeutic  Site: RLQ abdomen (fluid localized with US guidance)    Indication: Ascites in the setting of cirrhosis  Consent: Informed consent obtained from patient or designated decision maker after explaining the benefits/risks of the procedure including but not limited to bleeding, infection, nerve or other deep structure injury, pneumothorax/hemothorax, bowel perforation or injury, arrythmia, or death. Patient or surrogate expressed understanding and agreement and signed consent which can be found in the patient's chart.    Procedure: After obtaining consent, a time-out was performed. Appropriate site confirmed with ultrasound and patient positioned, prepped, and draped in sterile fashion. All those present wearing mask and those physically participating remained adhering to sterile fashion with mask and gloves. 5 mL of local anesthetic injected (1% lidocaine without epinephrine) achieving appropriate comfort level for patient. Large intraabdominal fluid pocket localized and accessed using continuous ultrasound guidance and a paracentesis catheter placed using Seldinger technique and Z-track method into the abdominal cavity without complication. Able to easily aspirate 60 mL fluid. Fluid collected for analysis and 7060 mL of fluid drained into vacutainer bottles. Catheter removed and site dressed with bandage. Significant reduction in fluid collection seen on US post-paracentesis. Patient tolerated procedure well without any difficulties and remains in care of bedside nurse. Samples sent to lab.    EBL: minimal  Complications: None    Rick Wood  [employer] Resident

## 2018-08-27 NOTE — ED TRIAGE NOTES
"Chief Complaint   Patient presents with   • Abdominal Pain     Random episodes of ABD pain   • Abdominal Swelling     Started about 6 months ago, last 2 weeks had profound swelling     Pt experiencing an increasing level of swelling and ABD pain. Pt also has swelling of the right foot since a \"couple days ago.\"     Brought in by EMS from home. Pt currently seeing GI doctor at [facility].   "

## 2018-08-28 NOTE — CARE PLAN
Problem: Urinary Elimination:  Goal: Ability to reestablish a normal urinary elimination pattern will improve  Outcome: PROGRESSING AS EXPECTED  Pt voids in bathroom without difficulty.

## 2018-08-28 NOTE — CARE PLAN
Problem: Safety  Goal: Will remain free from injury  Outcome: PROGRESSING AS EXPECTED  Pt walks independently with a steady gait to and from bathroom and in hallways.

## 2018-08-28 NOTE — DIETARY
"Nutrition services: Day 1 of admit.  Felicia Machado is a 53 y.o. female with admitting DX of Alcoholic Liver Disease   Consult received for Poor po intake     Assessment:  Weight: 65.8 kg (145 lb)  Patient stated that she is 64 \"   Body mass index is 24.89 kg/m².   Diet/Intake: Hepatic 1800 ml Fluid restriction     Malnutrition Risk: Patient with poor prognosis     Recommendations/Plan:  1. Patient with poor po prior to admit she is eating well now and has a good appetite   2. Encourage intake of food   3. Document intake of all intake as % taken in ADL's to provide interdisciplinary communication across all shifts.   4. Monitor weight.  5. Nutrition rep will continue to see patient for ongoing meal and snack preferences.         "

## 2018-08-28 NOTE — PROGRESS NOTES
Internal Medicine Interval Note  Note Author: Steffi Eubanks M.D.     Name Felicia Machado 1965   Age/Sex 53 y.o. female   MRN [id number]   Code Status Full     After 5PM or if no immediate response to page, please call for cross-coverage  Attending/Team: Dr. Kumar/Alex See Patient List for primary contact information  Call [phone] to page    1st Call - Day Intern (R1):   Dr. Ebuanks 2nd Call - Day Sr. Resident (R2/R3):   Dr. Navarro         Reason for interval visit  (Principal Problem)   Decompensated alcoholic cirrhosis  Ascites  Hyponatremia      Interval Problem Daily Status Update  (24 hours, problem oriented, brief subjective history, new lab/imaging data pertinent to that problem)   Ms. Machado, a pleasant 53-year-old female with history of alcoholic liver cirrhosis presented with gradual increase in abdominal distention with mild right upper quadrant abdominal pain.  This was associated with nausea and intermittent vomiting for the last 2 weeks with decreased food intake.  Patient was on spironolactone, propranolol, midodrine, and Lasix but patient states that she stopped taking them 2 weeks ago due to nausea.  Patient also reported of mild shortness of breath due to increasing abdominal girth.  Patient denies any fever, chills, confusion, or other symptoms.  Patient states that she had an episode of hematemesis a few days ago.  Patient currently drinks about 2 bottles of beer per day.    On admission patient was found to have tense abdominal distention.  Patient underwent paracentesis with removal of 7 L of fluid and replacement with albumin.  Patient states that she feels much better after paracentesis.    Evaluation of the labs show that she has hyponatremia now improving.  Her MDF score was 66, poor prognosis and may benefit from steroids.  Solu-Medrol started and will assess for response in 7 days ( 2018) with LILLE score.    Patient counseled for complete cessation of  alcohol due to her poor prognosis.  Patient understands and is willing to stop drinking.    Pt states no acute events overnight, no worsening abdominal distension. Pt denies any fever, SOB, N/V, abdominal pain or confusion. Pt gets at least 1-3 BM/day. Counseled patient to ambulate as much as possible. Will monitor patient  for any worsening SOB, fever, confusion or  GI bleeding.     Review of Systems   Constitutional: Negative for chills and fever.   HENT: Negative for congestion and sore throat.    Respiratory: Negative for cough and shortness of breath.    Cardiovascular: Negative for chest pain, orthopnea and leg swelling.   Gastrointestinal: Negative for abdominal pain, diarrhea, nausea and vomiting.   Genitourinary: Negative for dysuria and frequency.   Musculoskeletal: Negative for back pain.   Skin: Positive for rash (petechiae).   Neurological: Negative for dizziness and headaches.   Psychiatric/Behavioral: The patient is not nervous/anxious and does not have insomnia.        Disposition/Barriers to discharge:   Home when medically cleared    Consultants/Specialty  PCP: Bety Flores M.D.      Quality Measures  Quality-Core Measures   Reviewed items::  Labs reviewed, Medications reviewed and Radiology images reviewed  Rhodes catheter::  No Rhodes  DVT prophylaxis pharmacological::  Heparin  Ulcer Prophylaxis::  Yes      Physical Exam       Vitals:    08/27/18 1935 08/28/18 0349 08/28/18 0508 08/28/18 0700   BP: (!) 90/59 (!) 87/58 102/68 (!) 93/61   Pulse: 72 87 85 73   Resp: 18 18  16   Temp: 36.8 °C (98.2 °F) 36.5 °C (97.7 °F)  36.3 °C (97.4 °F)   SpO2: 93% 95%  96%   Weight:         Body mass index is 24.89 kg/m².    Oxygen Therapy:  Pulse Oximetry: 96 %, O2 (LPM): 0, O2 Delivery: None (Room Air)    Physical Exam   Constitutional: She is oriented to person, place, and time and well-developed, well-nourished, and in no distress.   HENT:   Head: Normocephalic and atraumatic.   Eyes: Pupils are equal, round,  and reactive to light. Conjunctivae and EOM are normal.   Neck: Normal range of motion. Neck supple. No thyromegaly present.   Cardiovascular: Normal rate, regular rhythm and normal heart sounds.    No murmur heard.  Pulmonary/Chest: Effort normal and breath sounds normal. No respiratory distress. She has no wheezes.   Abdominal: Soft. Bowel sounds are normal. She exhibits distension. She exhibits no mass. There is no tenderness.   Musculoskeletal: Normal range of motion. She exhibits edema (1+).   Lymphadenopathy:     She has no cervical adenopathy.   Neurological: She is alert and oriented to person, place, and time. Gait normal. GCS score is 15.   Skin: Skin is warm and dry. Rash (petechia like lesion seen on BL lower extremities) noted. No erythema.   Psychiatric: Mood and affect normal.       Assessment/Plan     * Decompensated ascites secondary to alcoholic cirrhosis and alcoholic hepatitis (HCC)   Assessment & Plan    - Gradual onset of ascites formation for the past 6 months  - 7 liters ascitic fluid drained via paracentesis in the ER  - albumin replaced  - fluid analysis did not show features of SBP  - SAAG >/= 1.1 - therefore secondary to portal HTN  - ascitic fluid total protein (AFTP) is < 1 - therefore increased risk of SBP  - Hyponatremia, Na 129 (8/28/20118)  - Continue spironolactone 100mg daily  - avoid NSAIDs        Alcoholic hepatitis with ascites   Assessment & Plan    - MDF - 63.3 - poor prognosis  - Solumedrol started on 8/27/2018  - will check Lille score on 9/2/2018 for response to glucocorticoids        Esophageal varices in alcoholic cirrhosis (HCC)   Assessment & Plan    - Pt states that she has one episode of hemoptysis  few days ago  - She has h/o UGIB with variceal banding  - pt is currently stable  - FOBT positive  - continue propranolol 10mg BID  -she has an appointment with gastroenterologist on 09/11/2018        Ovarian cyst- (present on admission)   Assessment & Plan    - Cystic  lesion in right adnexa now appears more rounded and measures 5.2 cm in maximum diameter per radiologist read in   07/18, possible cystadenoma / cystadenocarcinoma.   - Sized has decreased from 7 to 5 cm  - SAAG </= 1.1, unlikely due to ovarian cancer, rather portal HTN  - Ascitic fluid total protein  < 1  - f/u with gynaecologist        Coagulopathy (HCC)- (present on admission)   Assessment & Plan    - PT 24, INR 2.19, APTT 50.5  - Most likely due to liver cirrhosis  - multiple erythematous non blanching spots on skin of extremities  - Bilateral pitting edema 1+  - Teg study - No coagulation disorder   - Heparin Q8H for DVT prophylaxis  - Venous duplex done 8/28/2018 did not show any DVT        Alcoholic cirrhosis (HCC)- (present on admission)   Assessment & Plan    - negative asterixis, normal mental status, AOx4  - Scleral icterus, spider nevi, fetor hepaticus  - Complicated by portal hypertension resulting in ascites and esophageal varices  - Hepatic encephalopathy - Grade 0,  Pt has atleast 1 BM/day, no h/o constipation  - Maddrey DF 66.3, estimated 3 month mortality is 52.6%, poor prognosis  - MELD score 30, Poor prognosis  - CPS score 11 - 45% 1 year mortality  - renal and pulmonary function WNL  - Counseled patient to abstain from alcohol          Hyponatremia- (present on admission)   Assessment & Plan    - Improving Na = 129  - Restrict intake of free water            Thrombocytopenia (HCC)- (present on admission)   Assessment & Plan    - Plt count 125 on admission  - Not actively bleeding, petechiae like lesion on her legs  - Check vitamin C level  - FOBT positive  - continue to monitor        Alcoholism (HCC)- (present on admission)   Assessment & Plan    - Pt is a chronic consumer of alcohol  - Has been counseled about risks versus benefits of cessation and options regarding cessation (detox, rehab, AA)  - Consumes ~2 alcoholic beverages per day (beer)  - Discussed her risk of mortality secondary to  alcohol cirrhosis  - Pt is motivated to quit

## 2018-08-28 NOTE — CARE PLAN
Problem: Nutritional:  Goal: Achieve adequate nutritional intake  Patient will consume 50 % of meals  Outcome: MET Date Met: 08/28/18

## 2018-08-28 NOTE — CARE PLAN
Problem: Safety  Goal: Will remain free from injury  Safety precautions and fall prevention in place. Patient refused bed alarm despite education. Bed at lowest position, call light within reach, threaded socks on, hourly rounds.     Problem: Fluid Volume:  Goal: Will maintain balanced intake and output  Patient educated on fluid retention prevention and fluid restriction. Verbalized understanding. Patient encouraged to ambulate as tolerated. Encouraged to report any signs of increase fluid retention.

## 2018-08-28 NOTE — PROGRESS NOTES
Report received from day shift RN. Assumed care. Patient a/ox4. No distress noted. Denies any pain discomfort at this time. No medications scheduled at this time. Patient made comfortable. Encouraged to use call light for assistance.   Pt refuses bed alarm despite education on indication and risks. Reminded to call for assistance. Personal belongings within reach. Reinforced fall precautions Call light and personal belongings within reach, bed kept low, treaded socks on. Assisted as necessary. Kept rested and comfortable at all times. Hourly rounds.

## 2018-08-28 NOTE — NON-PROVIDER
Internal Medicine Medical Student Note  Note Author: Ramana Mehta, Student    Name Felicia Machado 1965   Age/Sex 53 y.o. female   MRN [id number]   Code Status Full             Reason for interval visit  (Principal Problem)   Ascites due to alcoholic cirrhosis (HCC)    Interval Problem Daily Status Update  (problem status, last 24 hours, new history, new data )     Mrs. Machado is a pleasant 53yrs F with a history of alcoholic cirrhosis that was admitted for abd swelling and leg swelling/pain two days ago. She reports noticing swelling of her abdomen progressively over the past 6 months but attributes her seeking of medical attention to drastic worsening of swelling and associated leg swelling/pain over the past 2 wks. Pt had a paracentesis done in the ED which drained 7L of fluid. ED physician reported wanted to drain more but ran out of bottles to collect the fluid. Pt has was admitted for decompensated ascites secondary to alcoholic cirrhosis.     Pt reports 2 episodes of diarrhea overnight as well as one event of her IV falling out which causing her to bleed, at 2 am and 5 am respectively. Pt denies any other acute events overnight and reports feeling the same as previous day. Pt denies: fever/chills/NS, abd pain, changes in abd fullness, nausea/vomiting, bleeding, changes in mentation.            Physical Exam       Vitals:    18 1935 18 0349 18 0508 18 0700   BP: (!) 90/59 (!) 87/58 102/68 (!) 93/61   Pulse: 72 87 85 73   Resp: 18 18  16   Temp: 36.8 °C (98.2 °F) 36.5 °C (97.7 °F)  36.3 °C (97.4 °F)   SpO2: 93% 95%  96%   Weight:         Body mass index is 24.89 kg/m².    Oxygen Therapy:  Pulse Oximetry: 96 %, O2 (LPM): 0, O2 Delivery: None (Room Air)    Physical Exam   General: Awake and pleasant, NAD  HEENT: NC/NT, face symmetric, moist mucus membranes, scleral ictrus present  Neck: supple, no LAD, no thyromegaly  CVS: RRR, S1/S2, no M/G/R  Pulm: CTAB, no  W/R/R  GI: NT, fluid wave present, +BS, liver not palpable due to fluid  Neuro: AOX3, CN 2-12 grossly intact, no focal exam  Skin: Diffuse petechiae over extremities        Assessment/Plan     #Decompensated ascites secondary to alcoholic cirrhosis:  -no signs of SBP  -50mg albumin replaced  -SAAG of 1.1 => portal HTN likely cause  -Cont. Albumin   -Restart spironolactone 100mg daily  -Watch hyponatremia    #Alcoholic hepatitis with cirrhosis:  -MDF of 63.3 lending to poor 3 month prognosis  -Solumedrol 40mg daily started on 8/27  -Check Lille score 9/2 for reassessment    #Esophageal varices in alcoholic cirrhosis  -Pt states that she has one episode of hemetemesis few days ago  - She has h/o variceal banding  - pt is currently stable  - FOBT  - Will monitor for any acute bleeding  - Start propranolol 10mg BID    #Coagulopathy  - PT 24, INR 2.19, APTT 50.5  - Most likely due to liver cirrhosis  - multiple erythematous non blanching spots on skin of extremities  - Bilateral pitting edema 1+  - Teg study  - SCDs for DVT prophylaxis   - will consider starting Lovenox based on teg study results    #Alcoholic cirrhosis  - negative asterixis, normal mental status, AOx4  - Scleral icterus, spider nevi, fetor hepaticus  - Complicated by portal hypertension resulting in ascites and esophageal varices  - Hepatic encephalopathy - Grade 0,  Pt has atleast 1 BM/day, no h/o constipation  - Maddrey DF 66.3, estimated 3 month mortality is 52.6%, poor prognosis  - MELD score 30, Poor prognosis  - CPS score 11 - 45% 1 year mortality  - renal and pulmonary function WNL  - Counseled patient to abstain from alcohol

## 2018-08-29 PROBLEM — D68.9 COAGULOPATHY (HCC): Status: RESOLVED | Noted: 2018-01-01 | Resolved: 2018-01-01

## 2018-08-29 NOTE — PROGRESS NOTES
Pt AOX4, denies pain at this time. Pt sitting up in bed watching television, needs met. Call light within reach, personal belongings available, bed in lowest position, treaded socks on, and hourly rounding in place.

## 2018-08-29 NOTE — NON-PROVIDER
Internal Medicine Medical Student Note  Note Author: Ramana Mehta, Student    Name Felicia Machado 1965   Age/Sex 53 y.o. female   MRN [id number]   Code Status Full Code       Mrs. Machado is a pleasant 53yrs F with a history of alcoholic cirrhosis that was admitted for abd swelling and leg swelling/pain two days ago. She reports noticing swelling of her abdomen progressively over the past 6 months but attributes her seeking of medical attention to drastic worsening of swelling and associated leg swelling/pain over the past 2 wks. Pt had a paracentesis done in the ED which drained 7L of fluid. ED physician reported wanted to drain more but ran out of bottles to collect the fluid. Pt has was admitted for decompensated ascites secondary to alcoholic cirrhosis.      Reason for interval visit  (Principal Problem)   Ascites due to alcoholic cirrhosis (HCC)    Interval Problem Daily Status Update  (problem status, last 24 hours, new history, new data )        Pt reports not acute events overnight. She has been ambulating regularly and has had two bowl movements in the past 24hrs. She expressed concern for progressive back pain onset yesterday which she attributes to her stay at the hospital and sitting in bed all day. She was given Tramadol this morning which gave her relief. Pt denies: fever/chills/NS, abd pain, changes in abd fullness, nausea/vomiting, bleeding, changes in mentation. Per Dr. Kumar, pt will be discharged today on prednisone 40mg daily for 30 days with a taper.     Physical Exam       Vitals:    18 1732 18 1930 18 0500 18 0650   BP:  (!) 94/56 100/49 (!) 95/62   Pulse: 73 63 60 65   Resp:  18 18 16   Temp:  36.2 °C (97.2 °F) 36.1 °C (97 °F) 36.3 °C (97.3 °F)   SpO2:  91% 92% 98%   Weight:       Height:         Body mass index is 24.89 kg/m².    Oxygen Therapy:  Pulse Oximetry: 98 %, O2 (LPM): 0, O2 Delivery: None (Room Air)    Physical Exam    Physical Exam    General: Awake and pleasant, NAD  HEENT: NC/NT, face symmetric, moist mucus membranes, scleral ictrus present  Neck: supple, no LAD, no thyromegaly  CVS: RRR, S1/S2, no M/G/R  Pulm: CTAB, no W/R/R  GI: NT, fluid wave present, +BS, liver not palpable due to fluid  Neuro: AOX3, CN 2-12 grossly intact, no focal exam  Skin: Diffuse petechiae on extremities, appear to be healing from yesterday        Assessment/Plan     #Decompensated ascites secondary to alcoholic cirrhosis:  -no signs of SBP  -50mg albumin replaced  -SAAG of 1.1 => portal HTN likely cause  -Continue Spironolactone 100mg and add Lasix 40mg daily  -Hyponatremia corrected at 132     #Alcoholic hepatitis with cirrhosis:  -MDF of 63.3 lending to poor 3 month prognosis  -Solumedrol 40mg daily started on 8/27  -Check Lille score 9/2 for reassessment     #Esophageal varices in alcoholic cirrhosis  -Pt states that she has one episode of hemetemesis few days ago  - She has h/o variceal banding  - pt is currently stable  - FOBT  - Will monitor for any acute bleeding  - Start propranolol 10mg BID  -F/u with GI on 9/11      #Coagulopathy  - PT 24, INR 2.19, APTT 50.5  - Most likely due to liver cirrhosis  - multiple erythematous non blanching spots on skin of extremities  - Bilateral pitting edema 1+  - Teg study  - SCDs for DVT prophylaxis      #Alcoholic cirrhosis  - negative asterixis, normal mental status, AOx4  - Scleral icterus, spider nevi, fetor hepaticus  - Complicated by portal hypertension resulting in ascites and esophageal varices  - Hepatic encephalopathy - Grade 0,  Pt has atleast 1 BM/day, no h/o constipation  - Pamela DF 66.3, estimated 3 month mortality is 52.6%, poor prognosis  - MELD score 30, Poor prognosis  - CPS score 11 - 45% 1 year mortality  - renal and pulmonary function WNL  - Counseled patient to abstain from alcohol

## 2018-08-29 NOTE — DISCHARGE INSTRUCTIONS
Discharge Instructions    Discharged to home by taxi with self. Discharged via wheelchair, hospital escort: Yes.  Special equipment needed: Not Applicable    Be sure to schedule a follow-up appointment with your primary care doctor or any specialists as instructed.     Discharge Plan:   Diet Plan: Discussed  Activity Level: Discussed  Confirmed Follow up Appointment: Appointment Scheduled  Confirmed Symptoms Management: Discussed  Medication Reconciliation Updated: Yes  Pneumococcal Vaccine Administered/Refused: Not given - Patient refused pneumococcal vaccine  Influenza Vaccine Indication: Not indicated: Previously immunized this influenza season and > 8 years of age    I understand that a diet low in cholesterol, fat, and sodium is recommended for good health. Unless I have been given specific instructions below for another diet, I accept this instruction as my diet prescription.   Other diet: hepatic diet with 800 mL fluid restriction     Special Instructions: None    · Is patient discharged on Warfarin / Coumadin?   No     Depression / Suicide Risk    As you are discharged from this [facility] facility, it is important to learn how to keep safe from harming yourself.    Recognize the warning signs:  · Abrupt changes in personality, positive or negative- including increase in energy   · Giving away possessions  · Change in eating patterns- significant weight changes-  positive or negative  · Change in sleeping patterns- unable to sleep or sleeping all the time   · Unwillingness or inability to communicate  · Depression  · Unusual sadness, discouragement and loneliness  · Talk of wanting to die  · Neglect of personal appearance   · Rebelliousness- reckless behavior  · Withdrawal from people/activities they love  · Confusion- inability to concentrate     If you or a loved one observes any of these behaviors or has concerns about self-harm, here's what you can do:  · Talk about it- your feelings and reasons for  harming yourself  · Remove any means that you might use to hurt yourself (examples: pills, rope, extension cords, firearm)  · Get professional help from the community (Mental Health, Substance Abuse, psychological counseling)  · Do not be alone:Call your Safe Contact- someone whom you trust who will be there for you.  · Call your local CRISIS HOTLINE 183-4094 or 734-496-5002  · Call your local Children's Mobile Crisis Response Team [place] (834) 919-7961 or wwwOrderingOnlineSystem.com  · Call the toll free National Suicide Prevention Hotlines   · National Suicide Prevention Lifeline 385-632-OIAA (1421)  · [employer] Hope Line Network 800-SUICIDE (713-1502)      Ascites  Ascites is a collection of excess fluid in the abdomen. Ascites can range from mild to severe. It can get worse without treatment.  What are the causes?  Possible causes include:  · Cirrhosis. This is the most common cause of ascites.  · Infection or inflammation in the abdomen.  · Cancer in the abdomen.  · Heart failure.  · Kidney disease.  · Inflammation of the pancreas.  · Clots in the veins of the liver.  What are the signs or symptoms?  Signs and symptoms may include:  · A feeling of fullness in your abdomen. This is common.  · An increase in the size of your abdomen or your waist.  · Swelling in your legs.  · Swelling of the scrotum in men.  · Difficulty breathing.  · Abdominal pain.  · Sudden weight gain.  If the condition is mild, you may not have symptoms.  How is this diagnosed?  To make a diagnosis, your health care provider will:  · Ask about your medical history.  · Perform a physical exam.  · Order imaging tests, such as an ultrasound or CT scan of your abdomen.  How is this treated?  Treatment depends on the cause of the ascites. It may include:  · Taking a pill to make you urinate. This is called a water pill (diuretic pill).  · Strictly reducing your salt (sodium) intake. Salt can cause extra fluid to be kept in the body, and this makes  ascites worse.  · Having a procedure to remove fluid from your abdomen (paracentesis).  · Having a procedure to transfer fluid from your abdomen into a vein.  · Having a procedure that connects two of the major veins within your liver and relieves pressure on your liver (TIPS procedure).  Ascites may go away or improve with treatment of the condition that caused it.  Follow these instructions at home:  · Keep track of your weight. To do this, weigh yourself at the same time every day and record your weight.  · Keep track of how much you drink and any changes in the amount you urinate.  · Follow any instructions that your health care provider gives you about how much to drink.  · Try not to eat salty (high-sodium) foods.  · Take medicines only as directed by your health care provider.  · Keep all follow-up visits as directed by your health care provider. This is important.  · Report any changes in your health to your health care provider, especially if you develop new symptoms or your symptoms get worse.  Contact a health care provider if:  · Your gain more than 3 pounds in 3 days.  · Your abdominal size or your waist size increases.  · You have new swelling in your legs.  · The swelling in your legs gets worse.  Get help right away if:  · You develop a fever.  · You develop confusion.  · You develop new or worsening difficulty breathing.  · You develop new or worsening abdominal pain.  · You develop new or worsening swelling in the scrotum (in men).  This information is not intended to replace advice given to you by your health care provider. Make sure you discuss any questions you have with your health care provider.  Document Released: 12/18/2006 Document Revised: 04/26/2017 Document Reviewed: 07/17/2015  [employer] Interactive Patient Education © 2017 [employer]

## 2018-08-29 NOTE — CARE PLAN
Problem: Safety  Goal: Will remain free from falls  Safety precautions in place. Bed in low position, treaded socks on, personal possessions in reach, call light in reach and pt using it to call for assistance.     Problem: Respiratory:  Goal: Respiratory status will improve  Pt on room air. No s/sx resipiratory distress.

## 2018-08-29 NOTE — PROGRESS NOTES
Request put in for transport. Pt signed discharge summary and narcotic consent signed. Pt has belongings with her.

## 2018-08-29 NOTE — PROGRESS NOTES
Paged [facility] blue. Pt is under the impression that the fluid restriction is being removed today. Per [facility] fluid restriction is still in effect and is not being listed. Also discussed pts pain not being controlled with PO tylenol. Pain in the lower back and abdomen. [facility] will put in orders to change medication.

## 2018-08-30 NOTE — DISCHARGE SUMMARY
Internal Medicine Discharge Summary  Note Author: Nhan Navarro M.D.       Name Felicia Machado 1965   Age/Sex 53 y.o. female   MRN [id number]         Admit Date:  2018       Discharge Date:   2018    Service:   [facility] Internal Medicine blue Team  Attending Physician(s):   Dr Kumar  Senior Resident(s):   Dr Navarro  Brian Resident(s):   Dr Eubanks  PCP: Bety Flores M.D.      Primary Diagnosis:   -Acute alcoholic hepatitis  -Decompensated cirrhosis with ascites, thrombocytopenia,Portal hypertension, official varices status post banding.  -Acute symptomatic hyponatremia secondary to intravascular volume depletion in the setting of decompensated cirrhosis.    Secondary Diagnoses:                Principal Problem:    Decompensated ascites secondary to alcoholic cirrhosis and alcoholic hepatitis (HCC) POA: Unknown  Active Problems:    Hyponatremia POA: Yes      Overview: On admission      - Na low 122 on admission      - serum osm 265      - urine osm = 149      - urine Na = < 10 , Urine Cr = 47.6    Alcoholic cirrhosis (HCC) POA: Yes      Overview: - LFTS 2018: AST 78 ALT 23 (ratio 2:1)  T. Bili 11.1 Alb 2.1    Ovarian cyst POA: Yes    Esophageal varices in alcoholic cirrhosis (HCC) POA: Unknown    Alcoholic hepatitis with ascites POA: Unknown      Overview: -    Alcoholism (HCC) POA: Yes    Thrombocytopenia (HCC) POA: Yes  Resolved Problems:    Coagulopathy (HCC) POA: Yes      Hospital Summary (Brief Narrative):       53-year-old female with a past medical history of alcoholic liver cirrhosis who presented to the ER with increased abdominal swelling and distention progressively worsening for the past 6 months with accompanied right upper quadrant abdominal pain.  Reported drinking approximately 2 alcoholic drinks usually beer every day as well as noncompliant with her medications including diuretics.  Denied any episodes of altered sleeping habits change in mentation or  feeling confused.  On physical examination she was noted to have shifting dullness with fluid thrill present with abdominal distention.  Her liver enzymes are elevated with AST of 78, ALT of 23 with ratio 2:1 with alkaline phosphatase of 145, T bili of 11.1, albumin of 2.1, INR 2.32 and sodium of 122 with ammonia levels of 72.  She underwent diagnostic and therapeutic paracentesis and almost 7 L of fluid was taken out and received IV albumin post the procedure.  Given her Maddrey discriminant function more than 60 she was started on IV Solu-Medrol.  Peritoneal fluid analysis showed no evidence of spontaneous bacterial peritonitis and was negative for atypical/malignant cells.  Lower extremity Dopplers were negative in the prelim study.  Given her history of ovarian cystic mass, slight clinical suspicion for probable peritoneal/ovarian induced malignant ascites but given her side SAAG was more than 1.1 that supported portal hypertension, as well as decrease ovarian cystic mass size and total ascitic protein of less than 1.1 supported portal hypertension and/or decompensated cirrhosis as her cause of ascites.  Pathology specimen/peritoneal fluid analysis was negative for atypical or malignant cells.AFP was less than 1 and she was restarted on her diuretic including Spironolactone and Lasix.  Her hyponatremia was attributed to possibly secondary to intravascular volume depletion in the setting of worsening decompensated cirrhosis/hypovolemia and/or dilutional hyponatremia.  Received gentle fluids and her sodium levels improved and was stable on discharge.  Being discharged on Lasix 40 mg and spironolactone 100 mg as well as propranolol 10 mg twice daily with prednisone at 50 mg.  Patient was advised to follow-up with primary care doctor and gastroenterology with repeat labs and assessment of her Kim score which would further play a very crucial assessment for continuation and/or discontinuation of prednisone therapy.   On discharge her T bili was down from 11.1 to 5.5 with 3 days of IV steroids therapy.        THINGS TO BE FOLLOWED UP:  -Repeat labs including CBC, CMP plasma, PT INR and reassessment of her Kim score and appropriate continuation after discontinuation of prednisone based on steroid therapy.  -Fluid restriction to 1800 mL with 1.5 g of sodium per day as well as compliance with diuretics and abstinence of from alcohol.  -Follow-up with primary care doctor and gastroenterology as scheduled:Dr Bety Flores-on Sept 8th;Dr Miller(GI)-Sept 11  -Follow-up with OB/GYN for further management of her ovarian cystic mass.    Patient /Hospital Summary (Details -- Problem Oriented) :          * Decompensated ascites secondary to alcoholic cirrhosis and alcoholic hepatitis (HCC)   Assessment & Plan    - Gradual onset of ascites formation for the past 6 months  - 7 liters ascitic fluid drained via paracentesis in the ER  - albumin replaced  - fluid analysis did not show features of SBP  - SAAG >/= 1.1 - therefore secondary to portal HTN  - ascitic fluid total protein (AFTP) is < 1     Plan:-  Continue with Lasix 40 mg and Spirinolactone 100 mg.            Alcoholic hepatitis with ascites   Assessment & Plan    - MDF - 63.3 - poor prognosis  - s/p steroids.  -Improved T.bili.  -discharge on PO steroids and will need to check response to steroids through PCP/GI.            Esophageal varices in alcoholic cirrhosis (HCC)   Assessment & Plan    - She has h/o UGIB with variceal banding  - pt is currently stable  - FOBT positive  - continue propranolol 10mg BID  -she has an appointment with gastroenterologist on 09/11/2018        Ovarian cyst   Assessment & Plan    - Cystic lesion in right adnexa now appears more rounded and measures 5.2 cm in maximum diameter per radiologist read in   07/18, possible cystadenoma / cystadenocarcinoma.   - Sized has decreased from 7 to 5 cm  - SAAG </= 1.1, unlikely due to ovarian cancer, rather  portal HTN  - Ascitic fluid total protein  < 1  - f/u with gynaecologist        Alcoholic cirrhosis (HCC)   Assessment & Plan    -Improved T.bili from 11 to 5.5 s/p IV steroids.  -Continue with PO steroids x 1 month and follow up with PCP with repeat labs.              Hyponatremia   Assessment & Plan    - Stable on discharge.            Thrombocytopenia (HCC)   Assessment & Plan    - Plt count 125 on admission  - Not actively bleeding, petechiae like lesion on her legs  - FOBT positive  - Pending Vit C levels and discharge on Vitamin C.           Alcoholism (HCC)   Assessment & Plan    - Pt is a chronic consumer of alcohol  - Has been counseled about risks versus benefits of cessation and options regarding cessation (detox, rehab, AA)  - Consumes ~2 alcoholic beverages per day (beer)  - Discussed her risk of mortality secondary to alcohol cirrhosis  - Pt is motivated to quit            Consultants:     None    Procedures:        Paracentesis    Imaging/ Testing:      LE VENOUS DUPLEX (DVT)         US-ABDOMEN LIMITED   Final Result         1.  Large quantity of abdominal ascites throughout the 4 abdominal quadrants.            Discharge Medications:         Medication Reconciliation: Completed       Medication List      START taking these medications      Instructions   ascorbic acid 1000 MG tablet  Commonly known as:  VITAMIN C   Take 1 Tab by mouth every day.  Dose:  1000 mg     folic acid 1 MG Tabs  Commonly known as:  FOLVITE   Take 1 Tab by mouth every day.  Dose:  1 mg     multivitamin Tabs   Take 1 Tab by mouth every day.  Dose:  1 Tab     predniSONE 50 MG Tabs  Commonly known as:  DELTASONE   Take 1 Tab by mouth every day.  Dose:  50 mg     propranolol 10 MG Tabs  Commonly known as:  INDERAL   Take 1 Tab by mouth 2 times a day.  Dose:  10 mg     thiamine 100 MG tablet  Commonly known as:  THIAMINE   Take 1 Tab by mouth every day.  Dose:  100 mg     tramadol 50 MG Tabs  Commonly known as:  ULTRAM   Take 1 Tab  by mouth every 6 hours as needed for Moderate Pain for up to 7 days.  Dose:  50 mg        CHANGE how you take these medications      Instructions   furosemide 40 MG Tabs  What changed:  medication strength  Commonly known as:  LASIX   Take 1 Tab by mouth every day.  Dose:  40 mg        CONTINUE taking these medications      Instructions   FLUoxetine 20 MG Caps  Commonly known as:  PROZAC   Take 20 mg by mouth every day.  Dose:  20 mg     levothyroxine 25 MCG Tabs  Commonly known as:  SYNTHROID   Take 25 mcg by mouth Every morning on an empty stomach.  Dose:  25 mcg     omeprazole 40 MG delayed-release capsule  Commonly known as:  PRILOSEC   Take 40 mg by mouth every day.  Dose:  40 mg     spironolactone 100 MG Tabs  Commonly known as:  ALDACTONE   Take 1 Tab by mouth every day.  Dose:  100 mg                Disposition:   At home    Diet:   Low salt/low fluid/hepatic diet    Activity:   As tolerated    Instructions:      Follow up with Dr Bety Flores  And Dr Miller  The patient was instructed to return to the ER in the event of worsening symptoms. I have counseled the patient on the importance of compliance and the patient has agreed to proceed with all medical recommendations and follow up plan indicated above.   The patient understands that all medications come with benefits and risks. Risks may include permanent injury or death and these risks can be minimized with close reassessment and monitoring.        Primary Care Provider:    Bety Flores M.D.    Discharge summary faxed to primary care provider:  Completed  Copy of discharge summary given to the patient: Completed      Follow up appointment details :      Dr Bety Flores-on Sept 8th  Dr Miller-Sept 11    Pending Studies:        None    Time spent on discharge day patient visit, preparing discharge paperwork and arranging for patient follow up.    Summary of follow up issues:   As stated above.    Discharge Time (Minutes) :  60 mins.  Hospital  Course Type:  Inpatient Stay >2 midnights      Condition on Discharge    ______________________________________________________________________    Interval history/exam for day of discharge:     Patient reports feeling better, no abdominal pain, no periods of confusion, denies any fever/chills and anxious to go home.      Most Recent Labs:    Lab Results   Component Value Date/Time    WBC 10.0 08/29/2018 03:09 AM    RBC 2.79 (L) 08/29/2018 03:09 AM    HEMOGLOBIN 10.2 (L) 08/29/2018 03:09 AM    HEMATOCRIT 27.4 (L) 08/29/2018 03:09 AM    MCV 98.2 (H) 08/29/2018 03:09 AM    MCH 36.6 (H) 08/29/2018 03:09 AM    MCHC 37.2 (H) 08/29/2018 03:09 AM    MPV 11.0 08/29/2018 03:09 AM    NEUTSPOLYS 81.00 (H) 08/29/2018 03:09 AM    LYMPHOCYTES 10.20 (L) 08/29/2018 03:09 AM    MONOCYTES 8.00 08/29/2018 03:09 AM    EOSINOPHILS 0.20 08/29/2018 03:09 AM    BASOPHILS 0.10 08/29/2018 03:09 AM    HYPOCHROMIA 1+ 11/18/2017 03:20 AM    ANISOCYTOSIS 2+ 11/18/2017 03:20 AM      Lab Results   Component Value Date/Time    SODIUM 132 (L) 08/29/2018 03:09 AM    POTASSIUM 4.4 08/29/2018 03:09 AM    CHLORIDE 103 08/29/2018 03:09 AM    CO2 20 08/29/2018 03:09 AM    GLUCOSE 100 (H) 08/29/2018 03:09 AM    BUN 13 08/29/2018 03:09 AM    CREATININE 0.47 (L) 08/29/2018 03:09 AM      Lab Results   Component Value Date/Time    ALTSGPT 21 08/29/2018 03:09 AM    ASTSGOT 54 (H) 08/29/2018 03:09 AM    ALKPHOSPHAT 106 (H) 08/29/2018 03:09 AM    TBILIRUBIN 5.5 (H) 08/29/2018 03:09 AM    LIPASE 8 (L) 08/26/2018 06:31 PM    ALBUMIN 2.3 (L) 08/29/2018 03:09 AM    ALBUMIN <1.0 08/26/2018 08:18 PM    GLOBULIN 3.5 08/29/2018 03:09 AM    INR 2.32 (H) 08/29/2018 12:49 PM    MACROCYTOSIS 2+ 11/18/2017 03:20 AM     Lab Results   Component Value Date/Time    PROTHROMBTM 25.2 (H) 08/29/2018 12:49 PM    INR 2.32 (H) 08/29/2018 12:49 PM

## 2018-08-30 NOTE — PROGRESS NOTES
Internal Medicine Interval Note  Note Author: Nhan Navarro M.D.     Name Felicia Machado 1965   Age/Sex 53 y.o. female   MRN [id number]   Code Status Full     After 5PM or if no immediate response to page, please call for cross-coverage  Attending/Team: Dr. Kumar/Alex See Patient List for primary contact information  Call [phone] to page    1st Call - Day Intern (R1):   Dr. Eubanks 2nd Call - Day Sr. Resident (R2/R3):   Dr. Navarro     Ms. Machado, a pleasant 53-year-old female with history of alcoholic liver cirrhosis presented with gradual increase in abdominal distention with mild right upper quadrant abdominal pain.  This was associated with nausea and intermittent vomiting for the last 2 weeks with decreased food intake.  Patient was on spironolactone, propranolol, midodrine, and Lasix but patient states that she stopped taking them 2 weeks ago due to nausea.  Patient also reported of mild shortness of breath due to increasing abdominal girth.  Patient denies any fever, chills, confusion, or other symptoms.  Patient states that she had an episode of hematemesis a few days ago.  Patient currently drinks about 2 bottles of beer per day.    On admission patient was found to have tense abdominal distention.  Patient underwent paracentesis with removal of 7 L of fluid and replacement with albumin.  Patient states that she feels much better after paracentesis.    Evaluation of the labs show that she has hyponatremia now improving.  Her MDF score was 66, poor prognosis and may benefit from steroids.  Solu-Medrol started and will assess for response in 7 days ( 2018) with LILLE score.    Patient counseled for complete cessation of alcohol due to her poor prognosis.  Patient understands and is willing to stop drinking.    Pt states no acute events overnight, no worsening abdominal distension. Pt denies any fever, SOB, N/V, abdominal pain or confusion. Pt gets at least 1-3 BM/day.  Counseled patient to ambulate as much as possible. Will monitor patient  for any worsening SOB, fever, confusion or  GI bleeding.      Reason for interval visit  (Principal Problem)   Decompensated alcoholic cirrhosis  Ascites  Hyponatremia      Interval Problem Daily Status Update  (24 hours, problem oriented, brief subjective history, new lab/imaging data pertinent to that problem)   -Denies any periods of confusion, melena or hematochezia.  -Improved T.bili to 5.5 today with albumin 2.3 and INR 2.32.     Review of Systems   Constitutional: Negative for chills and fever.   HENT: Negative for congestion and sore throat.    Respiratory: Negative for cough and shortness of breath.    Cardiovascular: Negative for chest pain, orthopnea and leg swelling.   Gastrointestinal: Negative for abdominal pain, diarrhea, nausea and vomiting.   Genitourinary: Negative for dysuria and frequency.   Musculoskeletal: Negative for back pain.   Skin: Positive for rash (petechiae).   Neurological: Negative for dizziness and headaches.   Psychiatric/Behavioral: The patient is not nervous/anxious and does not have insomnia.        Disposition/Barriers to discharge:   Home when medically cleared    Consultants/Specialty  PCP: Bety Flores M.D.      Quality Measures  Quality-Core Measures   Reviewed items::  Labs reviewed, Medications reviewed and Radiology images reviewed  Rhodes catheter::  No Rhodes  DVT prophylaxis pharmacological::  Heparin  Ulcer Prophylaxis::  Yes      Physical Exam       Vitals:    08/28/18 1930 08/29/18 0500 08/29/18 0650 08/29/18 1425   BP: (!) 94/56 100/49 (!) 95/62 100/67   Pulse: 63 60 65 65   Resp: 18 18 16 18   Temp: 36.2 °C (97.2 °F) 36.1 °C (97 °F) 36.3 °C (97.3 °F) 36.5 °C (97.7 °F)   SpO2: 91% 92% 98%    Weight:       Height:         Body mass index is 24.89 kg/m².    Oxygen Therapy:  Pulse Oximetry: 98 %, O2 (LPM): 0, O2 Delivery: None (Room Air)    Physical Exam   Constitutional: She is oriented to  person, place, and time and well-developed, well-nourished, and in no distress.   HENT:   Head: Normocephalic and atraumatic.   Eyes: Pupils are equal, round, and reactive to light. Conjunctivae and EOM are normal.   Neck: Normal range of motion. Neck supple. No thyromegaly present.   Cardiovascular: Normal rate, regular rhythm and normal heart sounds.    No murmur heard.  Pulmonary/Chest: Effort normal and breath sounds normal. No respiratory distress. She has no wheezes.   Abdominal: Soft. Bowel sounds are normal. She exhibits distension. She exhibits no mass. There is no tenderness.   Musculoskeletal: Normal range of motion. Edema: 1+   Lymphadenopathy:     She has no cervical adenopathy.   Neurological: She is alert and oriented to person, place, and time. Gait normal. GCS score is 15.   Skin: Skin is warm and dry. Rash (petechia like lesion seen on BL lower extremities) noted. No erythema.   Psychiatric: Mood and affect normal.       Assessment/Plan     * Decompensated ascites secondary to alcoholic cirrhosis and alcoholic hepatitis (HCC)   Assessment & Plan    - Gradual onset of ascites formation for the past 6 months  - 7 liters ascitic fluid drained via paracentesis in the ER  - albumin replaced  - fluid analysis did not show features of SBP  - SAAG >/= 1.1 - therefore secondary to portal HTN  - ascitic fluid total protein (AFTP) is < 1     Plan:-  Continue with Lasix 40 mg and Spirinolactone 100 mg.            Alcoholic hepatitis with ascites   Assessment & Plan    - MDF - 63.3 - poor prognosis  - s/p steroids.  -Improved T.bili.  -discharge on PO steroids and will need to check response to steroids through PCP/GI.            Esophageal varices in alcoholic cirrhosis (HCC)   Assessment & Plan    - Pt states that she has one episode of hemoptysis  few days ago  - She has h/o UGIB with variceal banding  - pt is currently stable  - FOBT positive  - continue propranolol 10mg BID  -she has an appointment with  gastroenterologist on 09/11/2018        Ovarian cyst- (present on admission)   Assessment & Plan    - Cystic lesion in right adnexa now appears more rounded and measures 5.2 cm in maximum diameter per radiologist read in   07/18, possible cystadenoma / cystadenocarcinoma.   - Sized has decreased from 7 to 5 cm  - SAAG </= 1.1, unlikely due to ovarian cancer, rather portal HTN  - Ascitic fluid total protein  < 1  - f/u with gynaecologist        Alcoholic cirrhosis (HCC)- (present on admission)   Assessment & Plan    -Improved T.bili from 11 to 5.5 s/p IV steroids.  -Continue with PO steroids x 1 month and follow up with PCP with repeat labs.              Hyponatremia- (present on admission)   Assessment & Plan    - Stable on discharge.            Thrombocytopenia (HCC)- (present on admission)   Assessment & Plan    - Plt count 125 on admission  - Not actively bleeding, petechiae like lesion on her legs  - FOBT positive  - Pending Vit C levels and discharge on Vitamin C.           Alcoholism (HCC)- (present on admission)   Assessment & Plan    - Pt is a chronic consumer of alcohol  - Has been counseled about risks versus benefits of cessation and options regarding cessation (detox, rehab, AA)  - Consumes ~2 alcoholic beverages per day (beer)  - Discussed her risk of mortality secondary to alcohol cirrhosis  - Pt is motivated to quit

## 2018-09-28 PROBLEM — E87.6 HYPOKALEMIA: Status: ACTIVE | Noted: 2018-01-01

## 2018-09-28 PROBLEM — E03.9 HYPOTHYROIDISM: Status: ACTIVE | Noted: 2018-01-01

## 2018-09-28 PROBLEM — M48.50XA COMPRESSION FRACTURE OF VERTEBRA (HCC): Status: ACTIVE | Noted: 2018-01-01

## 2018-09-28 NOTE — THERAPY
OT orders received. Pt awaiting MRI and POC for possible thoracic and lumbar compression fractures. Will hold OT eval until appropriate and able.    Za Brown, OTR/L  Pager: [phone]

## 2018-09-28 NOTE — DISCHARGE PLANNING
Care Transition Team Assessment    Information Source  Orientation : Oriented x 4  Information Given By: Patient  Who is responsible for making decisions for patient? : Patient    Readmission Evaluation  Is this a readmission?: Yes - unplanned readmission  Was an appointment arranged for you prior to discharge?: No  Were there new prescriptions you were supposed to fill after you were discharged?:  (doesn't know)  Did you understand your discharge instructions?: Yes  Did you have enough support after your last discharge?: Yes    Elopement Risk  Legal Hold: No  Ambulatory or Self Mobile in Wheelchair: No-Not an Elopement Risk  Elopement Risk: Not at Risk for Elopement    Interdisciplinary Discharge Planning  Does Admitting Nurse Feel This Could be a Complex Discharge?: No  Primary Care Physician: Dr. Flores  Lives with - Patient's Self Care Capacity: Spouse (works days, helps pt OOB before leaving for work)  Patient or legal guardian wants to designate a caregiver (see row info): No  Support Systems: Spouse / Significant Other  Housing / Facility: 1 Story Apartment / Condo  Do You Take your Prescribed Medications Regularly: No  Reasons Why Not Taking Medications :  (has been too sick to take meds.)  Able to Return to Previous ADL's: Future Time w/Therapy  Mobility Issues: Yes  Prior Services: Intermittent Physical Support for ADL Per Family  Patient Expects to be Discharged to::  (home.)  Assistance Needed: Yes  Durable Medical Equipment: Walker    Discharge Preparedness  What is your plan after discharge?: Home with help  What are your discharge supports?: Partner  Prior Functional Level: Uses Walker, Uses Wheelchair, Independent with Activities of Daily Living  Difficulity with ADLs: Walking  Difficulty with ADLs Comment: uses walker and w/c  Difficulity with IADLs: Driving, Laundry, Shopping    Functional Assesment  Prior Functional Level: Uses Walker, Uses Wheelchair, Independent with Activities of Daily  Living    Finances  Financial Barriers to Discharge: No  Prescription Coverage: Yes    Vision / Hearing Impairment  Vision Impairment : Yes  Right Eye Vision: Impaired, Wears Glasses  Left Eye Vision: Impaired, Wears Glasses  Hearing Impairment : No    Values / Beliefs / Concerns  Values / Beliefs Concerns : No    Advance Directive  Advance Directive?: None  Advance Directive offered?: AD Booklet given    Domestic Abuse  Have you ever been the victim of abuse or violence?: No  Physical Abuse or Sexual Abuse: No  Verbal Abuse or Emotional Abuse: No  Possible Abuse Reported to:: Not Applicable    Psychological Assessment  History of Substance Abuse: Alcohol  Date Last Used - Alcohol: 9/26/18  History of Psychiatric Problems: No  Non-compliant with Treatment: Yes  Newly Diagnosed Illness: No    Discharge Risks or Barriers  Discharge risks or barriers?: Substance abuse  Patient risk factors: Lack of outside supports, Noncompliance    Anticipated Discharge Information  Anticipated discharge disposition: Home, [facility]  Discharge Address:  ([address] [place] [address].)  Discharge Contact Phone Number: [phone]    Anticipated Discharge Disposition: Home with home health and spouse.    Action: RN CM met with pt to discuss dc plan. Pt states she has a walker and a wheelchair at home that she uses when she is not feeling well. States she has been drinking to get rid of her back pain. States she is at home during the day by herself while spouse works, and lately has not had the energy to get up and eat or take meds. Support given. States spouse will be her ride home on day of discharge and she does have prescription coverage without copays.    Barriers to Discharge: Medical clearance.    Plan: RN CM to ask for home health referral.

## 2018-09-28 NOTE — H&P
Hospital Medicine History & Physical Note    Date of Service  9/27/2018    Primary Care Physician  Bety Flores M.D.    Consultants  None    Code Status  Full    Chief Complaint  Chief Complaint   Patient presents with   • Back Pain     x 1 month       History of Presenting Illness  53 y.o. female who presented on 9/27/2018 with back pain.  The patient was recently admitted to our hospital by the [facility] internal medicine resident team for decompensated liver cirrhosis.  During her hospitalization, she underwent paracentesis for both diagnostic and therapeutic purposes and was subsequently medically optimized prior to discharge home.  The patient reports that she had complained of back pain to her previous hospitalization but it had not been worked up at that time.  Additionally, she has not followed up with her primary care physician to discuss this.  She states that she was attempted sobriety however in the last 2 weeks, she has begun drinking again to control her back pain.  She describes her back pain as anywhere from moderate to severe, it has been constant, alleviated somewhat with alcohol intake, and aggravated with movement.  She denies any radiation of her pain, it is primarily in her lower back.  She states that she has had no fevers, chills, headache, chest pain, breathing problems, incontinence, diarrhea or dysuria.      Review of Systems  Review of Systems   Constitutional: Negative for chills and fever.   HENT: Negative for congestion and sore throat.    Eyes: Negative for photophobia.   Respiratory: Negative for cough, shortness of breath and wheezing.    Cardiovascular: Negative for chest pain and palpitations.   Gastrointestinal: Negative for abdominal pain, diarrhea, nausea and vomiting.   Genitourinary: Negative for dysuria.   Musculoskeletal: Positive for back pain. Negative for myalgias.   Skin: Negative.    Neurological: Negative for dizziness, tingling, focal weakness and headaches.  "  Psychiatric/Behavioral: Negative for depression and suicidal ideas.       Past Medical History  Past Medical History:   Diagnosis Date   • Cirrhosis (HCC)    • ETOH abuse        Surgical History  Patient denies    Family History  Mother with CVA in her 70s    Social History  Social History   Substance Use Topics   • Smoking status: Never Smoker   • Smokeless tobacco: Never Used   • Alcohol use Yes      Comment: 2 beers       Allergies  Allergies   Allergen Reactions   • Nyquil Unspecified     Per pt body becomes rigid, intolerant    • Pcn [Penicillins] Rash      \"patient stated she can tolerate Amoxicillin\"        Medications  No current facility-administered medications on file prior to encounter.      Current Outpatient Prescriptions on File Prior to Encounter   Medication Sig Dispense Refill   • propranolol (INDERAL) 10 MG Tab Take 1 Tab by mouth 2 times a day. 60 Tab 1   • predniSONE (DELTASONE) 50 MG Tab Take 1 Tab by mouth every day. 30 Tab 0   • furosemide (LASIX) 40 MG Tab Take 1 Tab by mouth every day. 30 Tab 1   • spironolactone (ALDACTONE) 100 MG Tab Take 1 Tab by mouth every day. 30 Tab 1   • folic acid (FOLVITE) 1 MG Tab Take 1 Tab by mouth every day. 30 Tab 1   • multivitamin (THERAGRAN) Tab Take 1 Tab by mouth every day. 30 Tab 1   • ascorbic acid (VITAMIN C) 1000 MG tablet Take 1 Tab by mouth every day. 30 Tab 1   • thiamine (THIAMINE) 100 MG tablet Take 1 Tab by mouth every day. 30 Tab 1   • FLUoxetine (PROZAC) 20 MG Cap Take 20 mg by mouth every day.     • levothyroxine (SYNTHROID) 25 MCG Tab Take 25 mcg by mouth Every morning on an empty stomach.     • omeprazole (PRILOSEC) 40 MG delayed-release capsule Take 40 mg by mouth every day.         Physical Exam  Hemodynamics  Temp (24hrs), Av.6 °C (99.7 °F), Min:37.6 °C (99.7 °F), Max:37.6 °C (99.7 °F)   Temperature: 37.6 °C (99.7 °F)  Pulse  Av.6  Min: 103  Max: 114 Heart Rate (Monitored): (!) 103  Blood Pressure: 112/65, NIBP: 112/73  "     Respiratory      Respiration: 19, Pulse Oximetry: 98 %             Physical Exam  Capillary refill less than 3 seconds, distal pulses intact    Laboratory:  Recent Labs      09/27/18 2032   WBC  8.2   RBC  3.09*   HEMOGLOBIN  10.8*   HEMATOCRIT  30.8*   MCV  99.7*   MCH  35.0*   MCHC  35.1*   RDW  52.0*   PLATELETCT  123*   MPV  10.6     Recent Labs      09/27/18 2032   SODIUM  123*   POTASSIUM  3.2*   CHLORIDE  92*   CO2  20   GLUCOSE  109*   BUN  8   CREATININE  0.60   CALCIUM  8.5     Recent Labs      09/27/18 2032   ALTSGPT  15   ASTSGOT  34   ALKPHOSPHAT  207*   TBILIRUBIN  5.3*   LIPASE  13   GLUCOSE  109*     Recent Labs      09/27/18 2032   APTT  44.6*   INR  1.49*             No results found for: TROPONINI    Imaging  Dx-lumbar Spine-2 Or 3 Views    Result Date: 9/27/2018 9/27/2018 9:17 PM HISTORY/REASON FOR EXAM: Fall one month ago with pain persisting TECHNIQUE/ EXAM DESCRIPTION AND NUMBER OF VIEWS:  3 views of the lumbar spine. COMPARISON: CT abdomen pelvis 7/25/2018 FINDINGS: There are 5 non-rib bearing lumbar type vertebral bodies. Multiple new vertebral body compression fractures are identified at T11 where there is nearly 50% anterior, 40% posterior height loss, T12 there is worsening now with 40% anterior, 20% posterior height loss. At L3 there is 50% anterior and posterior height loss. At L2 there is 15% superior endplate depression anteriorly and posterior leg. The alignment of the lumbar spine is normal. The intervertebral disk spaces are preserved.     Multiple new from July lumbar and thoracic spine compression fractures indicates severe underlying osteopenia. Also should consider marrow replacement process such as multiple myeloma contributing to such profound change in 2 months        Assessment/Plan:  Anticipate that patient will need greater than 2 midnights for management of the discussed medical issues.    * Hyponatremia- (present on admission)   Assessment & Plan    Patient  has a history of hyponatremia in the past, this is acute on chronic worsening.  This is multifactorial in light of her known cirrhosis of the liver with volume overload and possible contribution from her alcoholism and preferred drink of beer raising the specter of beer potomania.  I will check osmolality levels, provide her with gentle fluids and repeat chemistries in the morning to ensure improvement.  I am holding her Lasix for tonight.  Currently she is hemodynamically stable with no mentation changes.        Compression fracture of vertebra (HCC)   Assessment & Plan    Patient reports a history of osteo-porosis, she has multiple fractures in both the lumbar and thoracic spine.  I will check an SPEP as underlying malignancy such as multiple myeloma remains in the differentials.  I will check an MRI to see if this patient is amenable to augmentation with vertebroplasty.  In the meantime, she will receive symptomatic management for pain, I have requested a PT and OT consultation.        Hypokalemia   Assessment & Plan    The patient will be on IV potassium replenishment, I will check a magnesium level and correct if needed.        Alcoholic hepatitis with ascites- (present on admission)   Assessment & Plan    No acute exacerbation, patient's abdomen is distended however it is not tight there remains a fluid thrill.  She has a stable total bilirubin level, her INR is improved.  Unfortunately she continues to drink alcohol.  I am holding her Lasix for tonight and if her sodium levels improve by morning, we will consider resuming.  Continue spironolactone and propranolol.  Will monitor for evidence of alcohol withdrawal, if there are any changes, then I will have low tolerance for initiating CIWA protocol.        Hypothyroidism   Assessment & Plan    This is chronic and stable, continue home Synthroid.            Prophylaxis: Sequential compression devices for DVT prophylaxis, no PPI indicated, bowel protocol as  needed

## 2018-09-28 NOTE — THERAPY
Physical Therapy orders received, MRI showing acute compression fractures at T9, T11, L3. Hold PT today per nsg pending kyphoplasty. Will complete eval after procedure. Pt interviewed, reports w/c level at baseline with assist from spouse for her daily routine. Education done re role of PT in acute setting. -Glory Haley, PT

## 2018-09-28 NOTE — ED PROVIDER NOTES
"ED Provider Note    CHIEF COMPLAINT  Chief Complaint   Patient presents with   • Back Pain     x 1 month       HPI  Felicia Machado is a 53 y.o. female who presents with back pain.  The patient states she has had the pain for approximately 1 month.  She states the pain is in the paraspinal muscles of the lumbar spine.  She does not have any radicular component.  She also denies weakness to her lower extremities.  She states that she can barely ambulate though due to the pain.  She states the pain is worse with ambulation and is severe in intensity at times.  It is better at rest.  Currently her pain is moderate in intensity.  The patient states she continues to drink despite her known end-stage cirrhosis.  The patient is also been noncompliant with her medical regimen as she states that he is in too much pain to get up and take her medication.  She denies difficulties with bowel or bladder function.  She does have abdominal distention that is been increasing over the last several weeks and she was discharged after paracentesis.  She has not had any associated fevers.    REVIEW OF SYSTEMS  See HPI for further details. All other systems are negative.     PAST MEDICAL HISTORY  Past Medical History:   Diagnosis Date   • Cirrhosis (HCC)    • ETOH abuse        SOCIAL HISTORY  Social History     Social History   • Marital status: Single     Spouse name: N/A   • Number of children: N/A   • Years of education: N/A     Social History Main Topics   • Smoking status: Never Smoker   • Smokeless tobacco: Never Used   • Alcohol use Yes      Comment: 2 beers   • Drug use: No   • Sexual activity: Not on file     Other Topics Concern   • Not on file     Social History Narrative   • No narrative on file           PHYSICAL EXAM  VITAL SIGNS: /65   Pulse (!) 114   Temp 37.6 °C (99.7 °F) (Temporal)   Resp 16   Ht 1.626 m (5' 4\")   Wt 61.6 kg (135 lb 12.9 oz)   SpO2 97%   BMI 23.31 kg/m²   Constitutional: Chronically ill in " appearance  HENT: Normocephalic, Atraumatic, tympanic membranes are intact and nonerythematous bilaterally, Oropharynx moist without exudates or erythema, Nose normal.   Eyes: PERRLA, EOMI, Conjunctiva normal.  Neck: Supple without meningismus  Lymphatic: No lymphadenopathy noted.   Cardiovascular: Normal heart rate, Normal rhythm, No murmurs, No rubs, No gallops.   Thorax & Lungs: Normal breath sounds, No respiratory distress, No wheezing, No chest tenderness.   Abdomen: Distention, hypoactive bowel sounds, hepatomegaly, no significant tenderness to deep palpation   skin: First-degree burn to the lower back.   Back: First degree burn to the lower back the patient states she has been using a heating pad, she does have diffuse paraspinal tenderness but no midline tenderness or step-offs.   Extremities: Atraumatic with symmetric distal pulses, No edema, No tenderness, No cyanosis, No clubbing.   Neurologic: Alert & oriented x 3, cranial nerves II through XII are intact, Normal motor function, Normal sensory function, No focal deficits noted.   Psychiatric: Affect normal, Judgment normal, Mood normal.     RADIOLOGY/PROCEDURES  DX-LUMBAR SPINE-2 OR 3 VIEWS   Final Result      Multiple new from July lumbar and thoracic spine compression fractures indicates severe underlying osteopenia. Also should consider marrow replacement process such as multiple myeloma contributing to such profound change in 2 months          Differential diagnosis  Pyelonephritis, ureterolithiasis, peritonitis, lumbar strain, lumbar fracture  COURSE & MEDICAL DECISION MAKING  Pertinent Labs & Imaging studies reviewed. (See chart for details)  This a 53-year-old female who presents the [facility] with back pain.  The patient does have diffuse pain throughout the low back.  I did order an x-ray and it does show multiple compression fractures from severe osteoporosis.  I suspect the osteoporosis is from her alcohol abuse as well as her liver  disease.  The patient also has a significant hyponatremia.  Based on the hyponatremia and continued tachycardia despite IV pain medication the patient will receive a liter of fluid intravenously to help replenish the sodium as well as to help treat any possible dehydration.  The patient will require admission for further workup for the severe osteoporosis.  She also requires sodium correction.  At the time of admission she continued to be neurologically intact.    FINAL IMPRESSION  1.  Multiple compression fractures throughout the thoracic and lumbar spine  2.  End-stage cirrhosis due to alcohol abuse  3.  Stable anemia  4.  Hyponatremia     Disposition  The patient will be admitted in stable condition    Electronically signed by: Nemesio Gandhi, 9/27/2018 8:20 PM

## 2018-09-28 NOTE — ED NOTES
Assumed care of patient. Pt assessed, AAO x 4 . Patient's concerns addressed.  Fall precautions in place.  Pt repositioned and comfortable.  Call light within reach. Will continue to monitor.    KINDER FALL RISK       RISK  Present to ED b/c of fall (syncope, seizure, or ALOC) no  Age  >70 no  Altered Mental Status (Intoxicated with alcohol or substance confusion, inability to follow instructions, disorientation) no  Impaired Mobility (Ambulates or transfers with assistive devices or assist. Ambulates with unsteady gait and no assistance.  Unable to ambulate to transfer.) yes  Nursing Judgement (Bowel or bladder incontinence, diarrhea, urinary frequency or urgency, leg weakness, orthostatic hypotension, dizziness or vertigo, narcotic use.) yes    YES TO ANY RISK = HIGH FALL RISK     Interventions in place marked in RED   1. Move patient closer to nurses stations  2. Familiarize the patient with environment  3. Place call light within reach and demonstrate call light use  4. Keep patients personal possessions within patient safe reach (if appropriate)   5. Place stretcher in low position and brakes locked  6.Place yellow socks and armband on patient   7. Place green triangle on patients door  8. Give patient urinal if applicable  9. Keep floor surfaces clean and dry  10.Keep patient care areas uncluttered  11. Use a lap belt or posey vest   12. Assess patient hourly for :Pain, persona needs, position change, and call light access.

## 2018-09-28 NOTE — ASSESSMENT & PLAN NOTE
No acute exacerbation, LFT's stable, alcohol level was high, continue close monitoring.   Holding lasix and spironolactone due to hypotension and SHIN  Ammonia level is high continue lactulose  intubated

## 2018-09-28 NOTE — PROGRESS NOTES
Patient received from transport via gurney. Patient ambulated to bed with hand held assist. Patient in bed alert and oriented x4. Oriented to RM unit routine, provided purple folder. Call light in reach.Educated on fall risk and implemented fall precautions. Discussed POC. No further questions or concerns at this time.

## 2018-09-28 NOTE — ASSESSMENT & PLAN NOTE
Checking  SPEP and vit D, concern for possible  multiple myeloma,  MRI showed acute T11 and L3 vertebral body kyphoplasty cancelled due to decompensated condition.

## 2018-09-28 NOTE — PROGRESS NOTES
[facility] Medicine Daily Progress Note    Date of Service  9/28/2018    Chief Complaint  53 y.o. female admitted 9/27/2018 with back pain.         Interval Problem Update  Patient is resting in bed, no new complains, not in distress, she is sleepy, but able to give good history, no fever or chills, back pain is 5/10 down from 9/10, she denies any urinary or bowel retention or incontinence, no focal weakness, no new numbness or tingling.     Consultants/Specialty  none    Code Status  full    Disposition  Tbd.    Review of Systems  Review of Systems   Constitutional: Negative for chills and fever.   HENT: Negative for congestion.    Eyes: Negative for blurred vision.   Respiratory: Negative for cough.    Cardiovascular: Negative for chest pain and palpitations.   Gastrointestinal: Negative for heartburn and melena.   Genitourinary: Negative for dysuria and urgency.   Musculoskeletal: Positive for back pain. Negative for myalgias and neck pain.   Skin: Negative for itching.   Neurological: Negative for dizziness.   Endo/Heme/Allergies: Does not bruise/bleed easily.   Psychiatric/Behavioral: Negative for depression.        Physical Exam  Temp:  [36 °C (96.8 °F)-37.6 °C (99.7 °F)] 36.3 °C (97.4 °F)  Pulse:  [] 77  Resp:  [14-20] 14  BP: ()/(56-71) 100/69    Physical Exam   Constitutional: She is oriented to person, place, and time. She appears well-nourished. No distress.   HENT:   Head: Normocephalic and atraumatic.   Mouth/Throat: No oropharyngeal exudate.   Eyes: Scleral icterus is present.   Neck: Neck supple. No JVD present.   Cardiovascular: Regular rhythm and normal heart sounds.    Pulmonary/Chest: Effort normal and breath sounds normal. No respiratory distress. She has no wheezes.   Abdominal: Soft. Bowel sounds are normal. She exhibits distension (ascites ). There is no tenderness. There is no rebound.   Musculoskeletal: She exhibits edema. She exhibits no tenderness.   Lymphadenopathy:     She has  no cervical adenopathy.   Neurological: She is alert and oriented to person, place, and time. She exhibits normal muscle tone.   Skin: No erythema.   Psychiatric: She has a normal mood and affect.   Nursing note and vitals reviewed.      Fluids    Intake/Output Summary (Last 24 hours) at 09/28/18 1217  Last data filed at 09/28/18 0459   Gross per 24 hour   Intake             1540 ml   Output                0 ml   Net             1540 ml       Laboratory  Recent Labs      09/27/18 2032   WBC  8.2   RBC  3.09*   HEMOGLOBIN  10.8*   HEMATOCRIT  30.8*   MCV  99.7*   MCH  35.0*   MCHC  35.1*   RDW  52.0*   PLATELETCT  123*   MPV  10.6     Recent Labs      09/27/18 2032   SODIUM  123*   POTASSIUM  3.2*   CHLORIDE  92*   CO2  20   GLUCOSE  109*   BUN  8   CREATININE  0.60   CALCIUM  8.5     Recent Labs      09/27/18 2032   APTT  44.6*   INR  1.49*               Imaging  MR-THORACIC SPINE-WITH & W/O   Final Result      1.  Acute compression deformity of the T9 vertebral body with approximately 50% loss of height. There is no definite retropulsion. There is associated enhancement.   2.  Acute compression fracture of the T11 vertebral body with approximately 75% loss of height. There is retropulsion of the posterior cortex measuring 3 mm which results in mild central canal stenosis. There is associated enhancement.   3.  Mild inferior endplate compression deformity of the T12 vertebral body with minimal bandlike T2 and STIR signal which likely represents an acute compression fracture. There is associated enhancement.   4.  These fractures may be amenable to percutaneous vertebral augmentation. Consider interventional radiology consult for further evaluation if indicated.      MR-LUMBAR SPINE-WITH & W/O   Final Result      1.  Acute compression fracture with associated bone marrow edema and loss of height at T11 and L3      2.  Early or subacute compression fracture involving the inferior endplate of T12, superior  endplate of L2, and inferior endplate of L4 with minimal loss of height      3.  Multilevel annular disc bulge and facet arthropathy      DX-LUMBAR SPINE-2 OR 3 VIEWS   Final Result      Multiple new from July lumbar and thoracic spine compression fractures indicates severe underlying osteopenia. Also should consider marrow replacement process such as multiple myeloma contributing to such profound change in 2 months      IR-VERTEBROPLASTY    (Results Pending)        Assessment/Plan  * Hyponatremia- (present on admission)   Assessment & Plan    Patient has a history of hyponatremia in the past, this is acute on chronic worsening.  This is multifactorial in light of her known cirrhosis of the liver with volume overload and possible contribution from her alcoholism and preferred drink of beer raising the specter of beer potomania.  I will check osmolality levels, provide her with gentle fluids and repeat chemistries in the morning to ensure improvement.  I am holding her Lasix for tonight.  Currently she is hemodynamically stable with no mentation changes.        Compression fracture of vertebra (HCC)   Assessment & Plan    Checking  SPEP and vit D, concern for possible  multiple myeloma,  MRI showed acute T11 and L3 vertebral body fx, might be candidate for kypho, order placed. PT/OT after kypho, patient has non focal neuro examen.         Hypokalemia   Assessment & Plan    Replacing. F/u BMP.         Alcoholic hepatitis with ascites- (present on admission)   Assessment & Plan    No acute exacerbation, LFT's stable, alcohol level was high, continue close monitoring.   Restart lasix, spironolactone and propranolol.   WA protocol.         Hypothyroidism   Assessment & Plan    This is chronic and stable, continue home Synthroid.             VTE prophylaxis: scd's

## 2018-09-28 NOTE — PROGRESS NOTES
2 RN skin check completed with Tuyet VAUGHN. Patient wears glasses.  Skin is dry and flaky. Back discolored with scattered blisters and scabs Pic uploaded. Patient states she used heating pad too long. Sacrum reddenned and blanching. All bony prominences intact and blanching.

## 2018-09-28 NOTE — ED NOTES
Pt up to bedside commode, urine obtained and sent to lab, blood noted in urine, ERP notified. Repeat sample to be obtained when pt can void again.

## 2018-09-28 NOTE — ED TRIAGE NOTES
".Felicia Machado  .  Chief Complaint   Patient presents with   • Back Pain     x 1 month     Patient to triage with above complaint. Patient with distended abdomen r/t ascites. Daily ETOH. ./65   Pulse (!) 114   Temp 37.6 °C (99.7 °F) (Temporal)   Resp 16   Ht 1.626 m (5' 4\")   Wt 61.6 kg (135 lb 12.9 oz)   SpO2 97%   BMI 23.31 kg/m²     Patient to lobby and instructed to inform staff of any needs.  "

## 2018-09-28 NOTE — CARE PLAN
Problem: Communication  Goal: The ability to communicate needs accurately and effectively will improve  Outcome: PROGRESSING AS EXPECTED  Patient oriented to room, unit, and POC, Educated on fall risk, precautions, call light    Problem: Pain Management  Goal: Pain level will decrease to patient's comfort goal  Outcome: NOT MET  Patient educated on medication times and nonpharm pain relief techniques.

## 2018-09-29 PROBLEM — I95.9 HYPOTENSION: Status: ACTIVE | Noted: 2018-01-01

## 2018-09-29 PROBLEM — N17.9 AKI (ACUTE KIDNEY INJURY) (HCC): Status: ACTIVE | Noted: 2018-01-01

## 2018-09-29 PROBLEM — D68.9 COAGULOPATHY (HCC): Status: ACTIVE | Noted: 2018-01-01

## 2018-09-29 NOTE — PROGRESS NOTES
Assumed patient care at 0700. Received report from night shift. Assessment completed. A&Ox 4. Pt states pain 5/10 in back. Declines intervention. Hypotensive this am, MD aware, patient asymptomatic. No s/s of distress. Fall precautions in place; bed alarm on,  personal possessions and call light placed within reach.  POC discussed with pt, communication board updated. Pt denies any additional needs at this time.

## 2018-09-29 NOTE — PROGRESS NOTES
[facility] Medicine Daily Progress Note    Date of Service  9/29/2018    Chief Complaint  53 y.o. female admitted 9/27/2018 with back pain.         Interval Problem Update  Patient is resting in bed, her bp is been low this morning, patient states that her bp normally runs in the high 90's low 100's systolic, she denies any dizziness, no lightheadedness, no focal weakness, had VS checked that did not show significant change on her bp, she was on prednisone for 1 month but she stopped 4 days ago w/o any taper dose, denies fever, no abdominal pain, cortisol is borderline low.   Will start prednisone today, patient is on SHIN started on iv albumin tid, bmp q6hrs, might need to give 1 iv ns bolus, will check kidney US, US for ascites blood cx. If cr continue trending up will consult nephro.    Complex case, complex decision making case.     Consultants/Specialty  none    Code Status  full    Disposition  Tbd.    Review of Systems  Review of Systems   Constitutional: Negative for fever.   HENT: Negative for congestion.    Eyes: Negative for blurred vision.   Respiratory: Negative for cough.    Cardiovascular: Negative for chest pain and palpitations.   Gastrointestinal: Negative for abdominal pain, heartburn and melena.   Genitourinary: Negative for dysuria and urgency.   Musculoskeletal: Positive for back pain. Negative for myalgias and neck pain.   Skin: Negative for itching.   Neurological: Negative for dizziness, tingling, tremors and headaches.   Endo/Heme/Allergies: Does not bruise/bleed easily.   Psychiatric/Behavioral: Negative for depression.        Physical Exam  Temp:  [36.2 °C (97.2 °F)-36.6 °C (97.9 °F)] 36.6 °C (97.9 °F)  Pulse:  [67-89] 67  Resp:  [13-17] 17  BP: ()/(40-65) 80/50    Physical Exam   Constitutional: She is oriented to person, place, and time. She appears well-nourished. No distress.   HENT:   Head: Normocephalic and atraumatic.   Mouth/Throat: No oropharyngeal exudate.   Eyes: Scleral  icterus is present.   Neck: Neck supple. No JVD present.   Cardiovascular: Regular rhythm and normal heart sounds.    Pulmonary/Chest: Effort normal and breath sounds normal. No respiratory distress. She has no wheezes.   Abdominal: Soft. Bowel sounds are normal. She exhibits distension (ascites ). There is no tenderness. There is no rebound.   Musculoskeletal: She exhibits edema (1+ pitting b/l legs. ). She exhibits no tenderness.   Lymphadenopathy:     She has no cervical adenopathy.   Neurological: She is alert and oriented to person, place, and time. She exhibits normal muscle tone.   Skin: No erythema.   Psychiatric: She has a normal mood and affect.   Nursing note and vitals reviewed.      Fluids    Intake/Output Summary (Last 24 hours) at 09/29/18 1243  Last data filed at 09/28/18 1600   Gross per 24 hour   Intake              240 ml   Output                0 ml   Net              240 ml       Laboratory  Recent Labs      09/27/18 2032 09/29/18 0145   WBC  8.2  7.1   RBC  3.09*  2.57*   HEMOGLOBIN  10.8*  9.4*   HEMATOCRIT  30.8*  26.4*   MCV  99.7*  102.7*   MCH  35.0*  36.6*   MCHC  35.1*  35.6*   RDW  52.0*  52.2*   PLATELETCT  123*  123*   MPV  10.6  10.4     Recent Labs      09/28/18 1732 09/29/18 0145 09/29/18 0922   SODIUM  123*  127*  127*   POTASSIUM  3.9  4.0  3.7   CHLORIDE  96  97  97   CO2  19*  22  20   GLUCOSE  95  90  86   BUN  12  15  17   CREATININE  1.01  1.59*  1.96*   CALCIUM  7.9*  8.6  8.5     Recent Labs      09/27/18 2032 09/29/18 0145   APTT  44.6*   --    INR  1.49*  1.60*               Imaging  MR-THORACIC SPINE-WITH & W/O   Final Result      1.  Acute compression deformity of the T9 vertebral body with approximately 50% loss of height. There is no definite retropulsion. There is associated enhancement.   2.  Acute compression fracture of the T11 vertebral body with approximately 75% loss of height. There is retropulsion of the posterior cortex measuring 3 mm  which results in mild central canal stenosis. There is associated enhancement.   3.  Mild inferior endplate compression deformity of the T12 vertebral body with minimal bandlike T2 and STIR signal which likely represents an acute compression fracture. There is associated enhancement.   4.  These fractures may be amenable to percutaneous vertebral augmentation. Consider interventional radiology consult for further evaluation if indicated.      MR-LUMBAR SPINE-WITH & W/O   Final Result      1.  Acute compression fracture with associated bone marrow edema and loss of height at T11 and L3      2.  Early or subacute compression fracture involving the inferior endplate of T12, superior endplate of L2, and inferior endplate of L4 with minimal loss of height      3.  Multilevel annular disc bulge and facet arthropathy      DX-LUMBAR SPINE-2 OR 3 VIEWS   Final Result      Multiple new from July lumbar and thoracic spine compression fractures indicates severe underlying osteopenia. Also should consider marrow replacement process such as multiple myeloma contributing to such profound change in 2 months      IR-VERTEBROPLASTY    (Results Pending)   US-RENAL    (Results Pending)   US-ABDOMEN LTD (SOFT TISSUE)    (Results Pending)        Assessment/Plan  * Hyponatremia- (present on admission)   Assessment & Plan    Due to liver disease, chronic Na 127, continue free fluid restriction, continue monitoring Na level q6hrs for now.         Compression fracture of vertebra (HCC)   Assessment & Plan    Checking  SPEP and vit D, concern for possible  multiple myeloma,  MRI showed acute T11 and L3 vertebral body fx, for kypho on Monday, PT/OT after kypho, patient has non focal neuro examen.         Hypokalemia   Assessment & Plan    resolved.         Alcoholic hepatitis with ascites- (present on admission)   Assessment & Plan    No acute exacerbation, LFT's stable, alcohol level was high, continue close monitoring.   Stopped lasix and  spironolactone today due to hypotension and SHIN  Ammonia level is high will start lactulose. F/u ammonia level in am.   Will get abdominal US to check on ascites.         Coagulopathy (HCC)   Assessment & Plan    Due to liver disease, inr 1.6 today.  Thrombocytopenia stable.         Hypotension   Assessment & Plan    Unclear etiology, patient was on prednisone 50 mg daily for one month stopped 4 days ago, cortisol level is 7, will restart prednisone, will check blood cx, will start albumin tid, she is asymptomatic, denies dizziness, lightheadedness.         SHIN (acute kidney injury) (HCC)   Assessment & Plan    Possible ATN due to hypotension, started on iv albumin tid, strict I&Os, kidney us, stop omeprazole, f/u bmp at 2pm if Cr still trending up will consult nephro, no h/o iv contrast other that for MRI.         Hypothyroidism   Assessment & Plan    Continue Synthroid.             VTE prophylaxis: scd's

## 2018-09-29 NOTE — PROGRESS NOTES
MD paged regarding bp: 80/42. Ortho static bp requested by MD BIMAL. Patient asymptomatic, sitting up in bed, denies dizziness. No s/s of distress or labored breathing. MD to come to floor to assess patient.

## 2018-09-29 NOTE — PROGRESS NOTES
MD at bedside, ortho static bp taken. MD aware of readings (see flowsheet).   Patient continues to be asymptomatic. Scheduled for IR kyphoplasty, this RN updated IR on patients blood pressure and INR. IR tech to communicate information to radiologist and call this RN to discuss procedure.

## 2018-09-29 NOTE — ASSESSMENT & PLAN NOTE
Worsening, possibly decompensated liver disease, no clear infectious etiology.  On three pressors  Holding all diuretics.  Cover for undiscovered infectious source such as SBP with ceftriaxone. Blood cultures done, ascitis fluid sent for culture.  Hydrocortisone 100mg every 6 hours, recently patient was on oral steroids, cortisol level 7.  Florinef 0.1 mg daily  Albumin 25% infusion every 6 hours.  Prognosis is grim awaiting family, may withdraw care, DNR.

## 2018-09-29 NOTE — CARE PLAN
Problem: Communication  Goal: The ability to communicate needs accurately and effectively will improve    Intervention: [place] patient and significant other/support system to call light to alert staff of needs  Patient A&Ox4. Able to communicate needs appropriately.       Problem: Knowledge Deficit  Goal: Knowledge of disease process/condition, treatment plan, diagnostic tests, and medications will improve    Intervention: Assess knowledge level of disease process/condition, treatment plan, diagnostic tests, and medications  Patient NPO as of midnight for kyphoplasty.questions/concerns discussed with patient.

## 2018-09-29 NOTE — ASSESSMENT & PLAN NOTE
Possible ATN due to hypotension versus hepatorenal syndrome  Nephrology following  Creatinine slightly improved 2.07 but oliguric

## 2018-09-30 PROBLEM — J96.00 ACUTE RESPIRATORY FAILURE (HCC): Status: ACTIVE | Noted: 2018-01-01

## 2018-09-30 PROBLEM — F10.932 ALCOHOL WITHDRAWAL SYNDROME WITH PERCEPTUAL DISTURBANCE (HCC): Status: ACTIVE | Noted: 2018-01-01

## 2018-09-30 NOTE — CONSULTS
Critical Care/Pulmonary Consultation    Date of service: 9/30/2018    Consulting Physician: Kit Sprague M.D.    Chief Complaint: Hypotension    History of Present Illness: Felicia Machado is a 53 y.o. female with a past medical history of decompensated alcoholic liver cirrhosis with portal hypertension and ascites, osteoporosis, presented to the hospital complaining of low back pain, was transferred to ICU from the floor due to persistent hypotension, therefore ICU consult.     ROS: As per HPI, otherwise all systems been reviewed and were negative    No current facility-administered medications on file prior to encounter.      Current Outpatient Prescriptions on File Prior to Encounter   Medication Sig Dispense Refill   • propranolol (INDERAL) 10 MG Tab Take 1 Tab by mouth 2 times a day. 60 Tab 1   • predniSONE (DELTASONE) 50 MG Tab Take 1 Tab by mouth every day. 30 Tab 0   • furosemide (LASIX) 40 MG Tab Take 1 Tab by mouth every day. 30 Tab 1   • spironolactone (ALDACTONE) 100 MG Tab Take 1 Tab by mouth every day. 30 Tab 1   • folic acid (FOLVITE) 1 MG Tab Take 1 Tab by mouth every day. 30 Tab 1   • multivitamin (THERAGRAN) Tab Take 1 Tab by mouth every day. 30 Tab 1   • ascorbic acid (VITAMIN C) 1000 MG tablet Take 1 Tab by mouth every day. 30 Tab 1   • thiamine (THIAMINE) 100 MG tablet Take 1 Tab by mouth every day. 30 Tab 1   • FLUoxetine (PROZAC) 20 MG Cap Take 20 mg by mouth every day.     • levothyroxine (SYNTHROID) 25 MCG Tab Take 25 mcg by mouth Every morning on an empty stomach.     • omeprazole (PRILOSEC) 40 MG delayed-release capsule Take 40 mg by mouth every day.         Social History   Substance Use Topics   • Smoking status: Never Smoker   • Smokeless tobacco: Never Used   • Alcohol use Yes      Comment: 2 beers        Past Medical History:   Diagnosis Date   • Cirrhosis (HCC)    • ETOH abuse        History reviewed. No pertinent surgical history.    Allergies: Nyquil and Pcn  "[penicillins]    History reviewed. No pertinent family history.    Vitals:    09/27/18 1739 09/27/18 1747 09/27/18 2030 09/27/18 2130   Height: 1.626 m (5' 4\")      Weight:  61.6 kg (135 lb 12.9 oz)     Weight % change since last entry.:  0 %     BP: 112/65      Pulse: (!) 114  (!) 111 (!) 106   Resp: 16 20 19   Temp: 37.6 °C (99.7 °F)      TempSrc: Temporal       09/27/18 2200 09/27/18 2230 09/27/18 2300 09/28/18 0000   Height:       Weight:       Weight % change since last entry.:       BP:       Pulse: (!) 109 (!) 103 (!) 104 (!) 103   Resp: 18 19 20    Temp:       TempSrc:        09/28/18 0104 09/28/18 0415 09/28/18 0800 09/28/18 1636   Height:       Weight:       Weight % change since last entry.:       BP: 113/71 (!) 91/56 100/69 100/65   Pulse: 99 75 77 76   Resp: 18 19 14 16   Temp: 36.3 °C (97.4 °F) 36 °C (96.8 °F) 36.3 °C (97.4 °F) 36.2 °C (97.2 °F)   TempSrc:        09/28/18 2055 09/28/18 2154 09/28/18 2305 09/29/18 0044   Height:       Weight:       Weight % change since last entry.:       BP: (!) 79/50 (!) 78/50 (!) 76/48 (!) 70/40   Pulse: 80 88 75 86   Resp: 14   13   Temp: 36.6 °C (97.8 °F)   36.4 °C (97.6 °F)   TempSrc:        09/29/18 0334 09/29/18 0420 09/29/18 0718 09/29/18 0900   Height:       Weight:  65.7 kg (144 lb 13.5 oz)     Weight % change since last entry.:  6.66 %     BP: (!) 82/50 (!) 86/51 (!) 80/43 (!) 80/42   Pulse:  89 82 67   Resp:  15 17    Temp:  36.4 °C (97.6 °F) 36.6 °C (97.9 °F)    TempSrc:        09/29/18 1155 09/29/18 1513 09/29/18 1800 09/29/18 1945   Height:       Weight:       Weight % change since last entry.:       BP: (!) 80/50 (!) 82/44 (!) 86/47 (!) 77/45   Pulse: 67 77 68 81   Resp:  17  15   Temp:  36.6 °C (97.9 °F)  36.3 °C (97.4 °F)   TempSrc:        09/29/18 2244 09/29/18 2340 09/30/18 0044 09/30/18 0158   Height:       Weight:       Weight % change since last entry.:       BP: (!) 60/50 (!) 81/52 (!) 82/62 (!) 80/60   Pulse:    84   Resp:       Temp:     "   TempSrc:        09/30/18 0306 09/30/18 0306 09/30/18 0307 09/30/18 0312   Height:       Weight:       Weight % change since last entry.:       BP: 101/68  (!) 99/56 101/63   Pulse:   94 92   Resp:       Temp:  36.7 °C (98 °F)     TempSrc:        09/30/18 0328 09/30/18 0335 09/30/18 0351 09/30/18 0359   Height:       Weight:       Weight % change since last entry.:       BP: (!) 96/62 (!) 90/63 (!) 95/57 (!) 91/60   Pulse: 94 91  88   Resp:  14     Temp:  36.7 °C (98 °F)     TempSrc:        09/30/18 0455 09/30/18 0456 09/30/18 0457 09/30/18 0458   Height:       Weight:       Weight % change since last entry.:       BP:  (!) 92/51     Pulse: 90 90 90 89   Resp:       Temp:       TempSrc:        09/30/18 0459 09/30/18 0500 09/30/18 0507 09/30/18 0511   Height:       Weight:  67.7 kg (149 lb 4 oz)     Weight % change since last entry.:  3.04 %     BP:       Pulse: 88 90 87 88   Resp:       Temp:       TempSrc:        09/30/18 0512 09/30/18 0515 09/30/18 0530   Height:      Weight:      Weight % change since last entry.:      BP:      Pulse: 90 90 94   Resp:      Temp:      TempSrc:          Physical Examination  General: Average built  Neuro/Psych: CN II-XII grossly within normal limits, no focal neurological deficits, alert and oriented x4  HEENT: Normocephalic, atraumatic, PERRLA, EOMI  CVS: S1-S2 within normal limits, soft precordial systolic murmur, no rubs, gallops, 2+ bilateral pitting lower extremities edema  Respiratory: Clear breath sounds bilateral anterior lateral chest, trach is midline, symmetric expansion of the chest with respiration  Abdomen/: Distended abdomen with positive fluid wave, nontender, no guarding to palpation  Extremities: No bony deformities, joint swelling, joint erythema  Skin: No skin rashes, bruises, jaundice except pallor    Recent Labs      09/27/18 2032 09/29/18   0145  09/30/18   0014   WBC  8.2  7.1  4.3*   NEUTSPOLYS  70.70   --   85.20*   LYMPHOCYTES  15.30*   --   8.80*    MONOCYTES  11.30   --   4.60   EOSINOPHILS  1.60   --   0.00   BASOPHILS  0.60   --   0.70   ASTSGOT  34   --   36   ALTSGPT  15   --   12   ALKPHOSPHAT  207*   --   138*   TBILIRUBIN  5.3*   --   4.6*     Recent Labs      09/29/18   1436  09/29/18   1734 09/30/18   0014   SODIUM  127*  126*  126*   POTASSIUM  3.7  3.8  3.6   CHLORIDE  99  98  99   CO2  20  18*  19*   BUN  19  19  21   CREATININE  2.25*  2.25*  2.30*   CALCIUM  8.1*  8.3*  8.6     Recent Labs      09/27/18 2032 09/29/18   1436 09/29/18   1734 09/30/18   0014   ALTSGPT  15   --    --    --   12   ASTSGOT  34   --    --    --   36   ALKPHOSPHAT  207*   --    --    --   138*   TBILIRUBIN  5.3*   --    --    --   4.6*   DBILIRUBIN   --    --    --    --   2.6*   LIPASE  13   --    --    --    --    GLUCOSE  109*   < >  103*  132*  161*    < > = values in this interval not displayed.           Invalid input(s): [id number]  US-RENAL   Final Result      1.  Negative for hydronephrosis      2.  Bilateral small kidney compatible with sequela of chronic process      3.  Large amount of ascites      US-ABDOMEN LTD (SOFT TISSUE)   Final Result      Large amount of ascites present      MR-THORACIC SPINE-WITH & W/O   Final Result      1.  Acute compression deformity of the T9 vertebral body with approximately 50% loss of height. There is no definite retropulsion. There is associated enhancement.   2.  Acute compression fracture of the T11 vertebral body with approximately 75% loss of height. There is retropulsion of the posterior cortex measuring 3 mm which results in mild central canal stenosis. There is associated enhancement.   3.  Mild inferior endplate compression deformity of the T12 vertebral body with minimal bandlike T2 and STIR signal which likely represents an acute compression fracture. There is associated enhancement.   4.  These fractures may be amenable to percutaneous vertebral augmentation. Consider interventional radiology consult  for further evaluation if indicated.      MR-LUMBAR SPINE-WITH & W/O   Final Result      1.  Acute compression fracture with associated bone marrow edema and loss of height at T11 and L3      2.  Early or subacute compression fracture involving the inferior endplate of T12, superior endplate of L2, and inferior endplate of L4 with minimal loss of height      3.  Multilevel annular disc bulge and facet arthropathy      DX-LUMBAR SPINE-2 OR 3 VIEWS   Final Result      Multiple new from July lumbar and thoracic spine compression fractures indicates severe underlying osteopenia. Also should consider marrow replacement process such as multiple myeloma contributing to such profound change in 2 months      IR-VERTEBROPLASTY    (Results Pending)   US-PARACENTESIS, ABD WITH IMAGING    (Results Pending)       Assessment & Plan        1.  Acute on chronic hypotension  -History of decompensated alcoholic liver cirrhosis  -IV albumin gentle hydration with normal saline  -Midodrine and florinef  -Empiric antibiotic treatment with ceftriaxone for prophylaxis of SBP  -Procalcitonin is pending     2.  Acute kidney injury  -?  Hepatorenal syndrome  -C/w nephrologist in a.m.    3.  Decompensated alcoholic liver cirrhosis  -Portal hypertension with ascites  -Routine therapeutic paracentesis  -Supportive care  -Lactulose    4.  History of alcohol dependence  -Counseling for alcohol cessation  -Thiamine and folate    5.  Osteoporosis  -Low back pain  -Pending kyphoplasty by IR  -Fentanyl PRN    6.  Macrocytic anemia  -Scheduled folate  -B12 pending    7.  ICU prophylaxis  -SCDs and Pepcid    Family meeting: Not available at bedside    Critical care time: I spent 40 minutes personally providing critical care services including formulating plan of care.  This time was exclusive to this patient and does not include time spent in procedures.

## 2018-09-30 NOTE — CONSULTS
DATE OF SERVICE:  09/30/2018    REASON FOR CONSULTATION:  Cirrhosis and acute kidney failure.    HISTORY OF PRESENT ILLNESS:  The patient is a pleasant 53-year-old female with   a long history of alcoholic cirrhosis and alcoholic hepatitis who follows up   with Dr. Santos as an outpatient.  Patient has a long history of   abdominal ascites and has required serial paracentesis for this as she has   failed diuretics as an outpatient.  Patient denies any current nausea,   vomiting, diarrhea, fever, but has had some chills.  Patient admits to   intermittent chest pain, shortness of breath.  Patient denies any blood in the   stool.  Patient reports her bowel movements have been yellow recently.    Patient reports she used to have lower extremity edema; however, this resolved   and her edema now all sits in her abdomen.  Patient has been found to have   worsening creatinine and nephrology was consulted, and we were consulted for   the possibility of hepatorenal syndrome.    PAST MEDICAL HISTORY:  Alcoholic liver cirrhosis and history of alcoholic   hepatitis, abdominal ascites.    PAST SURGICAL HISTORY:  Patient denies.    FAMILY HISTORY:  CVA in mother.    SOCIAL HISTORY:  Patient admits to alcohol use.  Patient denies tobacco use.    ALLERGIES:  NYQUIL AND PENICILLIN.    MEDICATIONS:  Propranolol, prednisone, furosemide, spironolactone, folic acid,   multivitamin, ascorbic acid, thiamine, fluoxetine, levothyroxine, omeprazole.    REVIEW OF SYSTEMS:  A 14-point review of systems was obtained and found to be   negative except those above in HPI.    PHYSICAL EXAMINATION:  GENERAL:  In no acute distress.  Patient appears chronically ill, patient is   jaundiced.  HEENT:  PERRLA. Extraocular movement intact.  Sclerae positively icteric.    Conjunctivae, mildly pale.  HEART:  Regular rate and rhythm.  LUNGS:  Clear to auscultation bilaterally.  ABDOMEN:  Distended with ascites.  Positive fluid wave.  No guarding or    rebound.  Minimal tenderness diffusely.  MUSCULOSKELETAL:  No edema noted bilateral lower extremities.  NEUROLOGIC:  Grossly intact, but patient does appear slowed.  SKIN:  Positive spider angiomas on anterior chest and mild jaundice is seen.    LABORATORY DATA:  White count 4.3, hemoglobin 8.5, hematocrit 24.9, .9,   platelets 103.  Sodium 129, potassium 3.3, chloride 100, BUN 22, creatinine   2.14.  AST 36, ALT 12, alkaline phosphatase 138, total bilirubin 4.6, albumin   2.6, ammonia 68, lactic acid 2.2.  INR 1.8.    ASSESSMENT AND PLAN:  1.  Decompensated alcoholic cirrhosis per primary team.  MELD 31, MDF 44.    Patient recently had alcoholic hepatitis.  Patient was on steroids for this.    Patient was on prednisone; however, I recommend that prednisolone be used as   cirrhotic patients have a decreased ability to convert prednisone to   prednisolone; however, in this case with acute kidney injury, I would   recommend holding any steroids.  In addition, the patient has normal   transaminases and likely is not having a significant component of alcoholic   hepatitis, but rather decompensated liver disease.  No significant long-term   benefit in this scenario in a patient with steroids.  Recommend close followup   as an outpatient with Dr. Santos.  Patient will need serial   paracentesis and may not be able to tolerate diuretics very well.  In   addition, the patient had an abnormal imaging study approximately 2-3 months   ago, which showed concern for possible hepatocellular carcinoma.  I recommend   as an outpatient patient had AFP checked and a triple phase CT if she is able   to tolerate it from a kidney standpoint.  2.  Acute kidney injury.  This could be hepatorenal syndrome type 2.  Patient   is not responding significantly to IV fluids.  Recommend 2-3 days of triple   therapy including a combination of midodrine, octreotide, and albumin.    Patient is currently on albumin.  Midodrine should  be given orally at 7.5 mg   every 8 hours, octreotide should be given as a drip at 50 mcg per hour and   albumin ideally is given for 2 days at 1 g per kilogram per day followed by   25-50 g per day of the albumin until midodrine and octreotide therapy are   discontinued.  If the patient responds very well to this, then this is likely   hepatorenal syndrome per nephrology.  They feel that this is likely   hepatorenal syndrome because of the urine sodium, agree and will recommend   this treatment.  3.  Abdominal ascites, serial paracentesis.  Patient likely unable to tolerate   diuretics at this time because of kidney function.  Recommend low sodium less   than 2 g daily.  4.  Coagulopathy secondary to liver disease.  5.  Alcohol abuse.  Recommend complete cessation of alcohol use.  6.  Thrombocytopenia secondary to splenic sequestration from liver disease.  7.  Esophageal varices.  Patient sees Dr. Santos for her primary   gastroenterology needs and will undergo repeat EGD with possible banding for   him.  If the patient develops bleeding acutely while in the hospital, we would   plan on urgent EGD.  8.  Anemia, chronic.  No signs of obvious GI bleeding at this time.  9.  We will follow along with the patient for now.  Please call with any   questions or concerns.       ____________________________________     DO KEIRA Allen / CHIDI    DD:  09/30/2018 11:15:12  DT:  09/30/2018 14:49:54    D#:  [id number]  Job#:  [id number]

## 2018-09-30 NOTE — PROGRESS NOTES
Critical Care Progress Note    Date of admission  9/27/2018    Chief Complaint  53 y.o. Female with a past medical history of decompensated alcoholic liver cirrhosis with portal hypertension and ascites, osteoporosis, presented to the hospital complaining of low back pain, was transferred to ICU from the floor due to persistent hypotension, therefore ICU consult.     Hospital Course    9/30/2018: transferred to ICU      Interval Problem Update  Reviewed last 24 hour events:   - SANCHEZ - hydrocortisone started   - hypotension worsened throughout the day, CVC placed and levo started   - encephalopathy worsened and patient was subsequently intubated    Review of Systems  Review of Systems   Unable to perform ROS: Acuity of condition        Vital Signs for last 24 hours   Temp:  [36.3 °C (97.4 °F)-36.8 °C (98.2 °F)] 36.8 °C (98.2 °F)  Pulse:  [67-95] 93  Resp:  [14-17] 15  BP: ()/(42-68) 92/51    Hemodynamic parameters for last 24 hours       Vent Settings for last 24 hours       Physical Exam   Physical Exam   Constitutional: She appears well-developed and well-nourished. She appears lethargic.   HENT:   Head: Normocephalic and atraumatic.   Right Ear: External ear normal.   Left Ear: External ear normal.   Nose: Nose normal.   Eyes: Pupils are equal, round, and reactive to light. Conjunctivae are normal.   Neck: Neck supple. No JVD present. No tracheal deviation present.   Cardiovascular: Normal rate, regular rhythm and intact distal pulses.    Pulmonary/Chest: No accessory muscle usage. She is in respiratory distress. She has rales.   Abdominal: Soft. Bowel sounds are normal. She exhibits no distension. There is no tenderness.   Musculoskeletal: Normal range of motion. She exhibits no tenderness or deformity.   Neurological: She appears lethargic. She is disoriented. She exhibits normal muscle tone. Coordination normal. GCS eye subscore is 3. GCS verbal subscore is 2. GCS motor subscore is 5.   Skin: Skin is warm  and dry. No rash noted.   Jaundice   Psychiatric: She has a normal mood and affect. Her behavior is normal.   Nursing note and vitals reviewed.      Medications  Current Facility-Administered Medications   Medication Dose Route Frequency Provider Last Rate Last Dose   • lactulose 20 GM/30ML solution 30 mL  30 mL Oral TID Kit Sprague M.D.   Stopped at 09/30/18 0600   • fentaNYL (SUBLIMAZE) injection 25-50 mcg  25-50 mcg Intravenous Q HOUR PRN Carlos Perry M.D.       • midodrine (PROAMATINE) tablet 10 mg  10 mg Oral TID WITH MEALS Carlos Perry M.D.   10 mg at 09/30/18 0633   • fludrocortisone (FLORINEF) tablet 0.2 mg  0.2 mg Oral QAM Carlos Perry M.D.   0.2 mg at 09/30/18 0558   • thiamine tablet 100 mg  100 mg Oral DAILY Carlos Perry M.D.   100 mg at 09/30/18 0557   • folic acid (FOLVITE) tablet 1 mg  1 mg Oral DAILY Carlos Perry M.D.   1 mg at 09/30/18 0557   • famotidine (PEPCID) injection 20 mg  20 mg Intravenous DAILY Carlos Perry M.D.   20 mg at 09/30/18 0556   • vitamin D (Ergocalciferol) (DRISDOL) capsule 50,000 Units  50,000 Units Oral Q7 DAYS Kevin Ibrahim M.D.   50,000 Units at 09/29/18 0840   • cefTRIAXone (ROCEPHIN) 1 g in  mL IVPB  1 g Intravenous Q24HRS Kevin Ibrahim M.D.   Stopped at 09/30/18 0623   • albumin human 25% solution 50 g  50 g Intravenous Once PRN Kevin Ibrahim M.D.       • albumin human 25% solution 62.5 g  62.5 g Intravenous Once PRN Kevin Ibrahim M.D.       • NS infusion   Intravenous Continuous Kevin Ibrahim M.D. 75 mL/hr at 09/30/18 0500     • levothyroxine (SYNTHROID) tablet 25 mcg  25 mcg Oral AM ES Rani Medrano M.D.   25 mcg at 09/30/18 0554   • FLUoxetine (PROZAC) capsule 20 mg  20 mg Oral DAILY Rani Medrano M.D.   20 mg at 09/30/18 0557   • polyethylene glycol/lytes (MIRALAX) PACKET 1 Packet  1 Packet Oral QDAY PRN Rani Medrano M.D.        And   • magnesium hydroxide (MILK OF MAGNESIA)  suspension 30 mL  30 mL Oral QDAY PRGEOFF Medrano M.D.        And   • bisacodyl (DULCOLAX) suppository 10 mg  10 mg Rectal QDAY PRGEOFF Medrano M.D.       • ondansetron (ZOFRAN) syringe/vial injection 4 mg  4 mg Intravenous Q4HRS DEB Medrano M.D.       • ondansetron (ZOFRAN ODT) dispertab 4 mg  4 mg Oral Q4HRS PRGEOFF Medrano M.D.       • promethazine (PHENERGAN) tablet 12.5-25 mg  12.5-25 mg Oral Q4HRS DEB Medrano M.D.       • promethazine (PHENERGAN) suppository 12.5-25 mg  12.5-25 mg Rectal Q4HRS DEB Medrano M.D.       • prochlorperazine (COMPAZINE) injection 5-10 mg  5-10 mg Intravenous Q4HRS DEB Medrano M.D.       • Pharmacy Consult Request ...Pain Management Review   Other DEB Medrano M.D.        And   • oxyCODONE immediate-release (ROXICODONE) tablet 5 mg  5 mg Oral Q3HRS DEB Medrano M.D.        And   • oxyCODONE immediate-release (ROXICODONE) tablet 10 mg  10 mg Oral Q3HRS DEB Medrano M.D.   10 mg at 09/28/18 1353    And   • morphine (pf) 4 mg/ml injection 4 mg  4 mg Intravenous Q3HRS PRGEOFF Medrano M.D.           Fluids    Intake/Output Summary (Last 24 hours) at 09/30/18 0739  Last data filed at 09/30/18 0600   Gross per 24 hour   Intake             1410 ml   Output              460 ml   Net              950 ml       Laboratory  Recent Results (from the past 48 hour(s))   BASIC METABOLIC PANEL    Collection Time: 09/28/18 11:38 AM   Result Value Ref Range    Sodium 126 (L) 135 - 145 mmol/L    Potassium 3.5 (L) 3.6 - 5.5 mmol/L    Chloride 97 96 - 112 mmol/L    Co2 22 20 - 33 mmol/L    Glucose 103 (H) 65 - 99 mg/dL    Bun 10 8 - 22 mg/dL    Creatinine 0.72 0.50 - 1.40 mg/dL    Calcium 8.1 (L) 8.5 - 10.5 mg/dL    Anion Gap 7.0 0.0 - 11.9   ESTIMATED GFR    Collection Time: 09/28/18 11:38 AM   Result Value Ref Range    GFR If African American >60 >60 mL/min/1.73 m 2    GFR If Non African American >60 >60 mL/min/1.73 m 2   AMMONIA     Collection Time: 09/28/18  5:32 PM   Result Value Ref Range    Ammonia 96 (H) 11 - 45 umol/L   VITAMIN D,25 HYDROXY    Collection Time: 09/28/18  5:32 PM   Result Value Ref Range    25-Hydroxy   Vitamin D 25 8 (L) 30 - 100 ng/mL   BASIC METABOLIC PANEL    Collection Time: 09/28/18  5:32 PM   Result Value Ref Range    Sodium 123 (L) 135 - 145 mmol/L    Potassium 3.9 3.6 - 5.5 mmol/L    Chloride 96 96 - 112 mmol/L    Co2 19 (L) 20 - 33 mmol/L    Glucose 95 65 - 99 mg/dL    Bun 12 8 - 22 mg/dL    Creatinine 1.01 0.50 - 1.40 mg/dL    Calcium 7.9 (L) 8.5 - 10.5 mg/dL    Anion Gap 8.0 0.0 - 11.9   ESTIMATED GFR    Collection Time: 09/28/18  5:32 PM   Result Value Ref Range    GFR If African American >60 >60 mL/min/1.73 m 2    GFR If Non  57 (A) >60 mL/min/1.73 m 2   BASIC METABOLIC PANEL    Collection Time: 09/29/18  1:45 AM   Result Value Ref Range    Sodium 127 (L) 135 - 145 mmol/L    Potassium 4.0 3.6 - 5.5 mmol/L    Chloride 97 96 - 112 mmol/L    Co2 22 20 - 33 mmol/L    Glucose 90 65 - 99 mg/dL    Bun 15 8 - 22 mg/dL    Creatinine 1.59 (H) 0.50 - 1.40 mg/dL    Calcium 8.6 8.5 - 10.5 mg/dL    Anion Gap 8.0 0.0 - 11.9   CBC without Differential    Collection Time: 09/29/18  1:45 AM   Result Value Ref Range    WBC 7.1 4.8 - 10.8 K/uL    RBC 2.57 (L) 4.20 - 5.40 M/uL    Hemoglobin 9.4 (L) 12.0 - 16.0 g/dL    Hematocrit 26.4 (L) 37.0 - 47.0 %    .7 (H) 81.4 - 97.8 fL    MCH 36.6 (H) 27.0 - 33.0 pg    MCHC 35.6 (H) 33.6 - 35.0 g/dL    RDW 52.2 (H) 35.9 - 50.0 fL    Platelet Count 123 (L) 164 - 446 K/uL    MPV 10.4 9.0 - 12.9 fL   PROTHROMBIN TIME    Collection Time: 09/29/18  1:45 AM   Result Value Ref Range    PT 19.1 (H) 12.0 - 14.6 sec    INR 1.60 (H) 0.87 - 1.13   ESTIMATED GFR    Collection Time: 09/29/18  1:45 AM   Result Value Ref Range    GFR If  41 (A) >60 mL/min/1.73 m 2    GFR If Non  34 (A) >60 mL/min/1.73 m 2   BASIC METABOLIC PANEL    Collection Time:  09/29/18  9:22 AM   Result Value Ref Range    Sodium 127 (L) 135 - 145 mmol/L    Potassium 3.7 3.6 - 5.5 mmol/L    Chloride 97 96 - 112 mmol/L    Co2 20 20 - 33 mmol/L    Glucose 86 65 - 99 mg/dL    Bun 17 8 - 22 mg/dL    Creatinine 1.96 (H) 0.50 - 1.40 mg/dL    Calcium 8.5 8.5 - 10.5 mg/dL    Anion Gap 10.0 0.0 - 11.9   CORTISOL    Collection Time: 09/29/18  9:22 AM   Result Value Ref Range    Cortisol 7.3 0.0 - 23.0 ug/dL   ESTIMATED GFR    Collection Time: 09/29/18  9:22 AM   Result Value Ref Range    GFR If  32 (A) >60 mL/min/1.73 m 2    GFR If Non  27 (A) >60 mL/min/1.73 m 2   Urine Sodium Random    Collection Time: 09/29/18 12:50 PM   Result Value Ref Range    Sodium, Urine -per volume <10 mmol/L   Urine Creatinine Random    Collection Time: 09/29/18 12:50 PM   Result Value Ref Range    Creatinine, Random Urine 150.20 mg/dL   OSMOLALITY URINE    Collection Time: 09/29/18 12:50 PM   Result Value Ref Range    Osmolality Urine 257 (L) 300 - 900 mOsm/kg H2O   URINALYSIS    Collection Time: 09/29/18 12:50 PM   Result Value Ref Range    Color DK Yellow     Character Clear     Specific Gravity 1.014 <1.035    Ph 5.5 5.0 - 8.0    Glucose Negative Negative mg/dL    Ketones Negative Negative mg/dL    Protein 30 (A) Negative mg/dL    Bilirubin Moderate (A) Negative    Urobilinogen, Urine 1.0 Negative    Nitrite Positive (A) Negative    Leukocyte Esterase Trace (A) Negative    Occult Blood Large (A) Negative    Micro Urine Req Microscopic    URINE MICROSCOPIC (W/UA)    Collection Time: 09/29/18 12:50 PM   Result Value Ref Range    WBC 0-2 /hpf    RBC 20-50 (A) /hpf    Bacteria Rare (A) None /hpf    Epithelial Cells Negative /hpf    Hyaline Cast 0-2 /lpf   BASIC METABOLIC PANEL    Collection Time: 09/29/18  2:36 PM   Result Value Ref Range    Sodium 127 (L) 135 - 145 mmol/L    Potassium 3.7 3.6 - 5.5 mmol/L    Chloride 99 96 - 112 mmol/L    Co2 20 20 - 33 mmol/L    Glucose 103 (H) 65 - 99  mg/dL    Bun 19 8 - 22 mg/dL    Creatinine 2.25 (H) 0.50 - 1.40 mg/dL    Calcium 8.1 (L) 8.5 - 10.5 mg/dL    Anion Gap 8.0 0.0 - 11.9   ESTIMATED GFR    Collection Time: 09/29/18  2:36 PM   Result Value Ref Range    GFR If  27 (A) >60 mL/min/1.73 m 2    GFR If Non African American 23 (A) >60 mL/min/1.73 m 2   OSMOLALITY SERUM    Collection Time: 09/29/18  2:39 PM   Result Value Ref Range    Osmolality Serum 268 (L) 278 - 298 mOsm/kg H2O   BASIC METABOLIC PANEL    Collection Time: 09/29/18  5:34 PM   Result Value Ref Range    Sodium 126 (L) 135 - 145 mmol/L    Potassium 3.8 3.6 - 5.5 mmol/L    Chloride 98 96 - 112 mmol/L    Co2 18 (L) 20 - 33 mmol/L    Glucose 132 (H) 65 - 99 mg/dL    Bun 19 8 - 22 mg/dL    Creatinine 2.25 (H) 0.50 - 1.40 mg/dL    Calcium 8.3 (L) 8.5 - 10.5 mg/dL    Anion Gap 10.0 0.0 - 11.9   Fluid Albumin    Collection Time: 09/29/18  5:34 PM   Result Value Ref Range    Albumin 2.4 (L) 3.2 - 4.9 g/dL   LACTIC ACID    Collection Time: 09/29/18  5:34 PM   Result Value Ref Range    Lactic Acid 1.9 0.5 - 2.0 mmol/L   ESTIMATED GFR    Collection Time: 09/29/18  5:34 PM   Result Value Ref Range    GFR If  27 (A) >60 mL/min/1.73 m 2    GFR If Non African American 23 (A) >60 mL/min/1.73 m 2   BASIC METABOLIC PANEL    Collection Time: 09/30/18 12:14 AM   Result Value Ref Range    Sodium 126 (L) 135 - 145 mmol/L    Potassium 3.6 3.6 - 5.5 mmol/L    Chloride 99 96 - 112 mmol/L    Co2 19 (L) 20 - 33 mmol/L    Glucose 161 (H) 65 - 99 mg/dL    Bun 21 8 - 22 mg/dL    Creatinine 2.30 (H) 0.50 - 1.40 mg/dL    Calcium 8.6 8.5 - 10.5 mg/dL    Anion Gap 8.0 0.0 - 11.9   AMMONIA    Collection Time: 09/30/18 12:14 AM   Result Value Ref Range    Ammonia 68 (H) 11 - 45 umol/L   CBC WITH DIFFERENTIAL    Collection Time: 09/30/18 12:14 AM   Result Value Ref Range    WBC 4.3 (L) 4.8 - 10.8 K/uL    RBC 2.42 (L) 4.20 - 5.40 M/uL    Hemoglobin 8.5 (L) 12.0 - 16.0 g/dL    Hematocrit 24.9 (L) 37.0  - 47.0 %    .9 (H) 81.4 - 97.8 fL    MCH 35.1 (H) 27.0 - 33.0 pg    MCHC 34.1 33.6 - 35.0 g/dL    RDW 52.4 (H) 35.9 - 50.0 fL    Platelet Count 103 (L) 164 - 446 K/uL    MPV 10.4 9.0 - 12.9 fL    Neutrophils-Polys 85.20 (H) 44.00 - 72.00 %    Lymphocytes 8.80 (L) 22.00 - 41.00 %    Monocytes 4.60 0.00 - 13.40 %    Eosinophils 0.00 0.00 - 6.90 %    Basophils 0.70 0.00 - 1.80 %    Immature Granulocytes 0.70 0.00 - 0.90 %    Nucleated RBC 0.00 /100 WBC    Neutrophils (Absolute) 3.69 2.00 - 7.15 K/uL    Lymphs (Absolute) 0.38 (L) 1.00 - 4.80 K/uL    Monos (Absolute) 0.20 0.00 - 0.85 K/uL    Eos (Absolute) 0.00 0.00 - 0.51 K/uL    Baso (Absolute) 0.03 0.00 - 0.12 K/uL    Immature Granulocytes (abs) 0.03 0.00 - 0.11 K/uL    NRBC (Absolute) 0.00 K/uL   HEPATIC FUNCTION PANEL    Collection Time: 09/30/18 12:14 AM   Result Value Ref Range    Alkaline Phosphatase 138 (H) 30 - 99 U/L    AST(SGOT) 36 12 - 45 U/L    ALT(SGPT) 12 2 - 50 U/L    Total Bilirubin 4.6 (H) 0.1 - 1.5 mg/dL    Direct Bilirubin 2.6 (H) 0.1 - 0.5 mg/dL    Indirect Bilirubin 2.0 (H) 0.0 - 1.0 mg/dL    Albumin 2.6 (L) 3.2 - 4.9 g/dL    Total Protein 5.9 (L) 6.0 - 8.2 g/dL   ESTIMATED GFR    Collection Time: 09/30/18 12:14 AM   Result Value Ref Range    GFR If  27 (A) >60 mL/min/1.73 m 2    GFR If Non African American 22 (A) >60 mL/min/1.73 m 2   LACTIC ACID    Collection Time: 09/30/18  3:17 AM   Result Value Ref Range    Lactic Acid 2.2 (H) 0.5 - 2.0 mmol/L       Imaging  X-Ray:  I have personally reviewed the images and compared with prior images.  CT:    Reviewed    Assessment/Plan  * Hyponatremia- (present on admission)   Assessment & Plan    2/2 cirrhosis and HRS  Monitor        Compression fracture of vertebra (HCC)   Assessment & Plan    Pain control        Hypokalemia   Assessment & Plan    Replete to maintain >4        Decompensated ascites secondary to alcoholic cirrhosis and alcoholic hepatitis (HCC)- (present on admission)    Assessment & Plan    MELD 31, MDF 44  With likely HRS  Albumin, octreotide, midodrine  Hold steroids currently  GI consult        Alcoholic hepatitis with ascites- (present on admission)   Assessment & Plan    Serial paracentesis  Lasix/Spironalactone  GI consult        Acute respiratory failure (HCC)   Assessment & Plan    Intubated 9/30 for airway protection  RT/O2 protocols  Daily and PRN ABGs  Titration of ventilator therapy based on ABGs and patient's status  Sedation as tolerated/indicated  Daily CXR  HOB >30 degrees and peridex for VAP prevention  Pepcid for GI prophylaxis  SAT/SBT when able (ABCDEF Bundle)  Early mobility        Alcohol withdrawal syndrome with perceptual disturbance (HCC)   Assessment & Plan    CIWA  Prop on vent        Coagulopathy (HCC)   Assessment & Plan    2/2 cirrhosis  Monitor for bleeding, reverse if needed        Hypotension   Assessment & Plan    2/2 decompensated cirrhosis  Albumin, midodrine  Levophed PRN        SHIN (acute kidney injury) (HCC)   Assessment & Plan    ?HRS vs ATN  IVF  renal dose meds, avoid nephrotoxins  Strict I/Os  Follow renal function  urine studies  Octreotide, albumin 1g/day, midodrine        Hypothyroidism   Assessment & Plan    synthroid             VTE:  Contraindicated  Ulcer: PPI  Lines: Central Line  Ongoing indication addressed    I have performed a physical exam and reviewed and updated ROS and Plan today (9/30/2018). In review of yesterday's note (9/29/2018), there are no changes except as documented above.     Discussed patient condition and risk of morbidity and/or mortality with Hospitalist, RN, RT, Pharmacy, Code status disscussed, Charge nurse / hot rounds and Patient  The patient remains critically ill.  Critical care time = 50 minutes in directly providing and coordinating critical care and extensive data review.  No time overlap and excludes procedures.

## 2018-09-30 NOTE — CARE PLAN
Problem: Safety  Goal: Will remain free from injury  Outcome: PROGRESSING AS EXPECTED  Pt on bed alarm    Problem: Urinary Elimination:  Goal: Ability to reestablish a normal urinary elimination pattern will improve  Outcome: PROGRESSING AS EXPECTED  Pt has mishra

## 2018-09-30 NOTE — CONSULTS
DATE OF SERVICE:  09/30/2018    NEPHROLOGY CONSULTATION    REQUESTING PHYSICIAN:  Kevin Ibrahim MD    REASON FOR THE CONSULT:  To evaluate patient's acute kidney injury.    HISTORY OF PRESENT ILLNESS:  The patient is a 53-year-old female with known   alcohol related liver cirrhosis, baseline creatinine level is 0.6, admitted to   the hospital, 09/27/2018, with worsening back pain, and found to be in   decompensated liver cirrhosis.  Hospital course complicated with worsening   creatinine level from 0.7 to 2.3, improved to 2.14 on consultation day.  Also,   hyponatremia at 126 and 127, today 129.  Urine spot sodium less than 10.    Currently, patient states doing better, complaining of fatigue; otherwise, no   headache or dizziness.  No chest pain.  She has mild dyspnea on exertion and   increased abdominal size loading.  No nausea or vomiting.  Positive for   diarrhea from medications to treat ammonia.    REVIEW OF SYSTEMS:  GENERAL:  No fever or chills.  Positive for fatigue.  HEENT:  No nosebleeds, no sinus pain, and no sore throat.  NECK:  No pain, no stiffness.  RESPIRATORY:  Positive for mild shortness of breath.  No wheezes, rhonchi, no   cough.  No hemoptysis.  CARDIOVASCULAR:  No chest pain, no palpitations.  Positive for dyspnea.  GASTROINTESTINAL:  No nausea, vomiting.  No abdominal pain.  Positive for   diarrhea.  GENITOURINARY:  With Rhodes catheter.  MUSCULOSKELETAL:  Positive for chronic back pain.  No joint pain or myalgias.  All other systems reviewed, all negative.    PAST MEDICAL HISTORY:  Liver cirrhosis due to alcohol abuse.    PAST SURGICAL HISTORY:  None.    FAMILY HISTORY:  No history of kidney disease.    SOCIAL HISTORY:  No tobacco.  Positive for alcohol use on a daily basis.  No   drugs.    ALLERGIES:  ALLERGIC TO PENICILLIN.    OUTPATIENT MEDICATIONS:  Reviewed.    PHYSICAL EXAMINATION:  VITAL SIGNS:  Blood pressure 108/71, heart rate 93, and temperature 36.8   Celsius.  GENERAL  APPEARANCE:  Well-developed, well-nourished female, in no acute   distress.  HEENT:  Normocephalic, atraumatic.  Pupils equal, round, and reactive to   light.  Positive for icteric sclerae.  Nares patent.  Oropharynx clear, moist   mucosa, no erythema or exudate.  NECK:  Supple, no lymphadenopathy, no thyromegaly appreciated.  LUNGS:  Clear to auscultation bilaterally.  No rales, wheezes, or rhonchi.  HEART:  Regular rate.  No rub or gallop.  ABDOMEN:  Soft, distended.  Bowel sounds present, nontender to palpation.  No   palpable masses.  EXTREMITIES:  1+ pedal edema bilaterally.  No cyanosis.  NEUROLOGIC:  Alert and oriented x3.  No focal deficit.    LABORATORY RESULTS:  Hemoglobin level 8.5 and platelets 103.    Sodium 129, potassium 3.3, CO2 18, BUN 22, and creatinine 2.14.    Urine spot sodium less than 10.    Renal ultrasound, no hydronephrosis, bilateral small kidney size, and large   amount of ascites.    ASSESSMENT AND PLAN:  The patient is a 53-year-old female with known history   of alcohol-related liver cirrhosis, admitted with back pain and liver   cirrhosis compensation, found to be in acute kidney injury.  1.  Acute kidney injury with urine spot sodium less than 10, concerns of   hepatorenal syndrome.  Continue to monitor.  2.  Electrolytes.  Hyponatremia improving.  Hypokalemia to correct.  3.  Hypertension.  Blood pressure at 80s-90s/60s.  Patient is transferred to   intensive care unit to monitor closely.  4.  Anemia.  Hemoglobin level noticed a slight drop in hemoglobin level, to   monitor.  5.  Liver cirrhosis, suspicious for hepatorenal syndrome.  GI has been   consulted.  I appreciate their input.  6.  Volume, to monitor brain natriuretic peptide.    RECOMMENDATIONS:  1.  No need for emergent dialysis.  2.  Daily BMP monitoring.  3.  Low-sodium diet.  4.  Follow up GI recommendations.  5.  All medications adjust to renal doses.  6.  We will follow up patient closely.    Thank you for the  consult.       ____________________________________     MD DONNA GILES    DD:  09/30/2018 14:01:29  DT:  09/30/2018 15:53:48    D#:  [id number]  Job#:  [id number]

## 2018-09-30 NOTE — CODE DOCUMENTATION
Primary RN spoke to Dr. Sprague regarding patient status. New orders received from MD. Primary RN to place orders in Epic and MD to place transfer orders to ICU.

## 2018-09-30 NOTE — PROGRESS NOTES
pts bp 60/50 MD notified give extra dose of albumin and monitor. Pt is A&Ox4 however will answer some question inappropriately and becoming more confused.

## 2018-09-30 NOTE — PROGRESS NOTES
Pt's BP still low and now confused and having auditory and visual hallucinations and now does not know time she also is not answering some questions approprietly . Rapid response called, MD paged transfer order placed.

## 2018-09-30 NOTE — ASSESSMENT & PLAN NOTE
CIWA protocol initiated.  Start neurontin 300 mg tid.  Rally bag now, po vitamins in am.  Ativan prn on a sliding scale po or iv as indicated by CIWA score.

## 2018-09-30 NOTE — PROGRESS NOTES
Called by US RN who instructed me to get consent for procedure.  Called charge RN to inform her of situation and that I was uncomfortable with those instructions due to consent for procedure is out of my scope of practice.  Charge RN assisted with printing consent form and identifying to MDs to sign consent.  Charge nurse also talked with US tech at bedside about proper consent protocols.       Near end of paracentesis procedure 1345, patient became hypotensive. Maycol WALLIS notified and new orders received.  BP Q5 min and patient put in trendelenburg position.  Patient spitting blood at staff.  Yellow mask applied to patient who is at this time not following commands and is restless.

## 2018-09-30 NOTE — PROGRESS NOTES
PARACENTESIS  RADIOLOGIST: DR. GUEVARA  TECH: KAREN    PT WAS IN STABLE CONDITION WHEN TECH AND RADIOLOGIST ARRIVED AT PT BESIDE IN ICU. PT WAS CONSENTED BY DOCTORS PRIOR TO PROCEDURE. DR. GUEVARA PREFORMED THE PROCEDURE, PT TOLERATED PROCEDURE WELL. 5500CC OF FLUID WERE DRAINED FROM THE RLQ AND SENT TO LAB AT 1353.PT VITALS WERE MONITORED BY NURSE THROUGHOUT PROCEDURE. PT EXPERIENCED MINOR BLEEDING POST PROCEDURE WITH A SLIGHT DROP IN BLOOD PRESSURE WHICH WAS ADDRESSED BY THE PT'S NURSE. TECH LEFT PT IN STABLE CONDITION WITH NURSE AT 1405.

## 2018-09-30 NOTE — PROGRESS NOTES
2 RN Skin Assessment Complete:    Redness on face noted  Abdomen distended, semi-firm.  Blistering noted on back  Redness on sacrum, blanching. Patient refused Mepilex.    Interventions in place.

## 2018-09-30 NOTE — OR SURGEON
Immediate Post OP Note    PROCEDURE:  Informed consent was obtained by having 2 physicians sign the consent form attesting to the medical necessity of this procedure. A timeout was taken. Ascites was localized with real-time ultrasound. The right lower quadrant of the abdominal wall was prepared and draped in a sterile manner. Following local anesthesia with 1% lidocaine, a 5 Arabic Yueh pigtail catheter was advanced into the peritoneal cavity with trocar technique. 5500 cc of ascites was drained. A 60 cc sample was obtained for laboratory analysis. The patient tolerated the procedure well with no evidence of complication.      9/30/2018 2:22 PM Clinton El M.D.

## 2018-09-30 NOTE — PROGRESS NOTES
[facility] Hospitalist Progress Note    Date of Service: 2018    Chief Complaint  53 y.o. female admitted 2018 with back pain, found to have T11 and L3 fractures, scheduled for kyphoplasty.  Developed persistent hypotension and had to be transferred to the ICU.  History of alcoholic liver disease.    Interval Problem Update  She is agitated and hallucinating throughout the day.  After being given Ativan she is sedated.  She is able to cough and clear secretions.  Otherwise confused and unable to give any further history.    Consultants/Specialty  Nephrology and gastroenterology  Intensivist    Disposition  Remain in ICU        Review of Systems   Unable to perform ROS: Acuity of condition      Physical Exam  Laboratory/Imaging   Hemodynamics  Temp (24hrs), Av.5 °C (97.7 °F), Min:35.9 °C (96.7 °F), Max:36.8 °C (98.2 °F)   Temperature: 36.1 °C (96.9 °F)  Pulse  Av.2  Min: 67  Max: 114 Heart Rate (Monitored): 92  Blood Pressure: (!) 92/51, NIBP: (!) 78/52      Respiratory      Respiration: 18, Pulse Oximetry: 98 %, O2 Daily Delivery Respiratory : Silicone Nasal Cannula        RUL Breath Sounds: Clear;Diminished, RML Breath Sounds: Diminished, RLL Breath Sounds: Diminished, MITCH Breath Sounds: Clear;Diminished, LLL Breath Sounds: Diminished    Fluids    Intake/Output Summary (Last 24 hours) at 18 1632  Last data filed at 18 1400   Gross per 24 hour   Intake          1906.33 ml   Output              470 ml   Net          1436.33 ml       Nutrition  Orders Placed This Encounter   Procedures   • Diet Order Hepatic     Standing Status:   Standing     Number of Occurrences:   1     Order Specific Question:   Diet:     Answer:   Hepatic [9]     Order Specific Question:   Electrolyte modifications:     Answer:   1.5 g Sodium [1]     Order Specific Question:   Consistency/Fluid modifications:     Answer:   1500 ml Fluid Restriction [9]     Physical Exam   Constitutional: She appears well-developed and  well-nourished. She appears lethargic. No distress.   Patient seen and examined by me.   HENT:   Nose: Nose normal.   Mouth/Throat: Oropharynx is clear and moist. No oropharyngeal exudate.   Eyes: Conjunctivae are normal. Right eye exhibits no discharge. Left eye exhibits no discharge. Scleral icterus is present.   Neck: No JVD present. No tracheal deviation present.   Cardiovascular: Normal rate, regular rhythm and normal heart sounds.    Pulmonary/Chest: Effort normal and breath sounds normal. No stridor. No respiratory distress. She has no wheezes. She has no rales. She exhibits no tenderness.   Abdominal: Soft. Bowel sounds are normal. She exhibits distension. She exhibits no mass. There is tenderness. There is no rebound and no guarding.   Musculoskeletal: She exhibits no edema or tenderness.   Neurological: She appears lethargic. She is disoriented. No cranial nerve deficit. She exhibits normal muscle tone.   Skin: Skin is warm and dry. She is not diaphoretic. No pallor.   Psychiatric: Her mood appears anxious. She is agitated and actively hallucinating. Cognition and memory are impaired. She expresses impulsivity.   Nursing note and vitals reviewed.      Recent Labs      09/27/18 2032 09/29/18 0145  09/30/18   0014   WBC  8.2  7.1  4.3*   RBC  3.09*  2.57*  2.42*   HEMOGLOBIN  10.8*  9.4*  8.5*   HEMATOCRIT  30.8*  26.4*  24.9*   MCV  99.7*  102.7*  102.9*   MCH  35.0*  36.6*  35.1*   MCHC  35.1*  35.6*  34.1   RDW  52.0*  52.2*  52.4*   PLATELETCT  123*  123*  103*   MPV  10.6  10.4  10.4     Recent Labs      09/30/18   0014  09/30/18   0840  09/30/18   1545   SODIUM  126*  129*  131*   POTASSIUM  3.6  3.3*  3.6   CHLORIDE  99  100  102   CO2  19*  18*  16*   GLUCOSE  161*  135*  164*   BUN  21  22  22   CREATININE  2.30*  2.14*  2.11*   CALCIUM  8.6  8.8  8.5     Recent Labs      09/27/18 2032 09/29/18   0145  09/30/18   0840   APTT  44.6*   --   48.6*   INR  1.49*  1.60*  1.80*                   Assessment/Plan     * Hyponatremia- (present on admission)   Assessment & Plan    Due to liver disease, chronic improved up to 131, continue free fluid restriction, continue monitoring Na level q6hrs for now.         Compression fracture of vertebra (HCC)   Assessment & Plan    Checking  SPEP and vit D, concern for possible  multiple myeloma,  MRI showed acute T11 and L3 vertebral body kyphoplasty cancelled due to decompensated liver disease        Hypokalemia   Assessment & Plan    resolved.         Alcoholic hepatitis with ascites- (present on admission)   Assessment & Plan    No acute exacerbation, LFT's stable, alcohol level was high, continue close monitoring.   Holding lasix and spironolactone due to hypotension and SHIN  Ammonia level is high continue lactulose  Paracentesis done, albumin ordered.        Acute respiratory failure (HCC)   Assessment & Plan    Patient unable to protect airway due to need for use of sedatives specifically ativan to treat alcohol withdrawal.  Discussed with Dr. Espinal.  Intubation 9/30; vent settings per intensivist.  Propofol drip for sedation.  RT protocol.        Alcohol withdrawal syndrome with perceptual disturbance (HCC)   Assessment & Plan    CIWA protocol initiated.  Start neurontin 300 mg tid.  Rally bag now, po vitamins in am.  Ativan prn on a sliding scale po or iv as indicated by CIWA score.        Coagulopathy (HCC)   Assessment & Plan    Due to liver disease, inr 1.8 today.  Thrombocytopenia worsening 103.         Hypotension   Assessment & Plan    Worsening, possibly decompensated liver disease, no clear infectious etiology.  Start levophed infusion, place central line.  Holding diuretics.  Cover for undiscovered infectious source such as SBP with ceftriaxone. Blood cultures done, ascitis fluid sent for culture.  Hydrocortisone 100mg every 6 hours, recently patient was on oral steroids, cortisol level 7.  Florinef 0.1 mg daily  Albumin 25% infusion every 6  hours.  Prognosis is guarded.    This patient is critically ill.  A total of 45 minutes of critical care time spent with patient and nursing staff without overlap and independent of any procedures.  Please see the chart for a full listing of today's orders.          SHIN (acute kidney injury) (HCC)   Assessment & Plan    Possible ATN due to hypotension versus hepatorenal syndrome  Nephrology consult requested  Creatinine slightly improved 2.11        Hypothyroidism   Assessment & Plan    Continue Synthroid.          Quality-Core Measures   Reviewed items::  Labs reviewed, Medications reviewed and Radiology images reviewed  Rhodes catheter::  Critically Ill - Requiring Accurate Measurement of Urinary Output  DVT prophylaxis pharmacological::  Contraindicated - High bleeding risk  DVT prophylaxis - mechanical:  SCDs

## 2018-09-30 NOTE — CODE DOCUMENTATION
"Patient stating she, \"saw a mouse and hearing people knock on my door.\" CIWA score evaluated by Rapid Response Team. CIWA Score 23.  "

## 2018-10-01 PROBLEM — D62 ACUTE BLOOD LOSS ANEMIA: Status: ACTIVE | Noted: 2018-01-01

## 2018-10-01 PROBLEM — E27.40 ACUTE ADRENAL INSUFFICIENCY (HCC): Status: ACTIVE | Noted: 2018-01-01

## 2018-10-01 PROBLEM — Z71.89 DNR (DO NOT RESUSCITATE) DISCUSSION: Status: ACTIVE | Noted: 2018-01-01

## 2018-10-01 NOTE — ASSESSMENT & PLAN NOTE
Replete to maintain >4, holding electrolyte replacement protocol due to decreased urine output  Monitor and replete magnesium, calcium and phosphorus as well as clinically appropriate

## 2018-10-01 NOTE — ASSESSMENT & PLAN NOTE
2/2 cirrhosis  Monitor for bleeding, reverse if needed  Significant platelet dysfunction identified by platelet mapping, transfuse platelets as needed

## 2018-10-01 NOTE — PROGRESS NOTES
Call placed to Dr. Perry. Discussed patient's current condition, hemodynamic status, low UOP, current ABG results. Discussed IVMF infusing at this time. New orders received for blood pressure control via telephone. New medication orders placed by Dr. Perry to be implemented.

## 2018-10-01 NOTE — FLOWSHEET NOTE
Adult Ventilation Update    Total Vent Days: 1    Patient Lines/Drains/Airways Status    Active Airway     None                                Static Compliance (ml / cm H2O): 51 (09/30/18 1740)    Patient failed trials because of    Barriers to SBT    Length of Weaning Trial                         Cough: Congested;Productive (09/30/18 1740)  Sputum Amount: Moderate (09/30/18 1740)  Sputum Color: Tan (09/30/18 1740)  Sputum Consistency: Thick (09/30/18 1740)    Mobility  Level of Mobility: Level IV (09/30/18 1200)  Activity Performed: Ambulate (09/29/18 2010)  Time Activity Tolerated: 10 min (09/29/18 1400)  Distance Per Occurrence (ft.): 15 feet (09/29/18 1400)  # of Times Distance was Traveled: 2 (09/29/18 1400)  Assistance / Tolerance: Assistance of One (09/29/18 1400)  Pt Calls for Assistance: No (09/30/18 0800)  Staff Present for Mobilization: CNA (09/29/18 1400)  Gait: Unsteady;Shuffle (09/29/18 1400)  Assistive Devices: Hand held assist (09/29/18 1400)  Reason Not Mobilized: Unstable condition (hypotension, line being placed and pressors started) (09/30/18 1600)    Events/Summary/Plan: Pt. intubated by Dr. Severino and pt. placed on a vent w/o problems. (09/30/18 1740)

## 2018-10-01 NOTE — ASSESSMENT & PLAN NOTE
Severe, drop overnight to 2.2  GI bleed versus hemolysis, GI consulting  Patient getting 4 units of blood and one of FFP

## 2018-10-01 NOTE — CARE PLAN
Problem: Ventilation Defect:  Goal: Ability to achieve and maintain unassisted ventilation or tolerate decreased levels of ventilator support  Adult Ventilation Update    Total Vent Days: 2    Patient Lines/Drains/Airways Status    Active Airway     Name: Placement date: Placement time: Site: Days:    Airway ETT 7.5 09/30/18   1750      2                                 l               Sputum/Suction   Cough: Congested;Productive (09/30/18 1740)  Sputum Amount: Small (10/01/18 0000)  Sputum Color: Tan;Bloody (bloody oral secretions) (10/01/18 0000)  Sputum Consistency: Thick;Thin (10/01/18 0000)    Mobility  Level of Mobility: Level IV (09/30/18 2000)  Activity Performed: Unable to mobilize (09/30/18 2000)  Time Activity Tolerated: 10 min (09/29/18 1400)  Distance Per Occurrence (ft.): 15 feet (09/29/18 1400)  # of Times Distance was Traveled: 2 (09/29/18 1400)  Assistance / Tolerance: Assistance of One (09/29/18 1400)  Pt Calls for Assistance: No (09/30/18 0800)  Staff Present for Mobilization: CNA (09/29/18 1400)  Gait: Unsteady;Shuffle (09/29/18 1400)  Assistive Devices: Hand held assist (09/29/18 1400)  Reason Not Mobilized: Unstable condition (09/30/18 2000)  Mobilization Comments: Hypotensive; on pressors (09/30/18 2000)    Events/Summary/Plan: O2 weaned (10/01/18 0007)

## 2018-10-01 NOTE — ASSESSMENT & PLAN NOTE
Serial paracentesis, status post large volume paracentesis 9/30 on medical telemetry floor  Lasix/Spironalactone on hold  GI consult noted

## 2018-10-01 NOTE — PROCEDURES
Rapid Sequence Endotracheal Intubation    Indication for procedure: acute respiratory collapse    Procedure: She was unable to give consent, emergent consent assumed in order to preserve her life. The patient was pre-oxygenated using a bag valve mask device and placed in the appropriate position.  Normal potassium levels were confirmed.  Etomidate 20mg IV push followed by Succinylcholine 100mg IV push were administered.  After 90 seconds muscular relaxation was achieved, no fasiculations were noted.  On indirect laryngoscopy using a Glidescope device equipped with a Mac 3 blade, I immediately was able to visualize the vocal cords.  The trachea was intubated successfully using a 7.5 Bangladeshi ET tube. The cuff was then inflated and the tube was secured appropriately at a distance of 24 cm to the dental ridge. Correct placement initially confirmed by equal breath sounds auscultated bilaterally, tube fogging, adequate chest rise, adequate pulse oximetry and no noises heard over the stomach.  A chest xray showed the ET tube was in the right main stem bronchus, it was repositioned to a level of 21 cm at the teeth, correct positioning confirmed by chest xray.

## 2018-10-01 NOTE — PROGRESS NOTES
Tay from Lab called with critical result of Hgb 2.2 at 0605. Critical lab result read back to Tay.   Dr. Severino notified of critical lab result at 0610.  Critical lab result read back by Dr. Severino.    Order received to transfuse 2 units uncrossmatched PRBCs stat, 2 units of FFP stat, and 2 units type and crossmatched PRBCs. Check CBC 1 hour post transfusion. TEG with platelet mapping drawn per MD. Order implemented.     0630: Updates given to patient's significant other, Bill. Two RN consent via phone given for blood transfusion.     0700: Page placed to GI MD Santa  0715: Page placed to Hospitalist MD Severino. Updates given.

## 2018-10-01 NOTE — CARE PLAN
Problem: Safety  Goal: Will remain free from injury  Outcome: PROGRESSING AS EXPECTED  Q 2 hr restraint assessment and documentation.     Problem: Venous Thromboembolism (VTW)/Deep Vein Thrombosis (DVT) Prevention:  Goal: Patient will participate in Venous Thrombosis (VTE)/Deep Vein Thrombosis (DVT)Prevention Measures  Outcome: PROGRESSING AS EXPECTED  SCDs on BLE    Problem: Skin Integrity  Goal: Risk for impaired skin integrity will decrease  Outcome: PROGRESSING AS EXPECTED  Q 2 hr turns when intubated, turns self prior to intubation. Skin intact save for items charted on in flowsheets

## 2018-10-01 NOTE — ASSESSMENT & PLAN NOTE
?HRS vs ATN  IVF  renal dose meds, avoid nephrotoxins  Strict I/Os  Follow renal function  urine studies  Albumin  Not a good candidate for RRT  Discussed with nephrology today  Multiple possible etiologies of acute renal failure including HRS, ATN from hypoxemia/hypertension, sepsis etc.  Prognosis poor, dialysis not recommended by nephrology

## 2018-10-01 NOTE — PROGRESS NOTES
2 RN skin check completed    Patient noted with redness/rash on face  Bilateral upper extremity bruising  Large, rounded distended abdomen with bruising to RLQ from paracentesis  Skin tear to right labia   Open blisters to mid posterior back, pink   Sacrum and bony prominences intact.

## 2018-10-01 NOTE — THERAPY
"Pt not medically stable. Not appropriate for OT at this time. Will \"complete\" order. Please re-consult if indicated in the future.  "

## 2018-10-01 NOTE — ASSESSMENT & PLAN NOTE
Intubated 9/30 for airway protection, now on ventilator for probable ARDS  Developing pulmonary edema!  Possibly both volume overload cardiogenic as well as ARDS!  Serial ABG/chest x-ray and ventilator mechanics review, having more issues ventilating, PCO2 increasing  RT/O2 protocols  Titration of ventilator therapy based on ABGs and patient's status  Adjust I time and PEEP to help with oxygenation  Patient may need Flolan by inhalation to help with VQ matching and oxygenation  She is not a pronating candidate given her current abdominal circumstances  Consider APRV rescue mode for oxygenation  Prognosis very poor  Sedation as tolerated/indicated  HOB >30 degrees and peridex for VAP prevention  Pepcid for GI prophylaxis -> PPI drip  SAT/SBT when able (ABCDEF Bundle)  Early mobility when clinically appropriate

## 2018-10-01 NOTE — PROGRESS NOTES
[facility] Hospitalist Progress Note    Date of Service: 10/1/2018    Chief Complaint  53 y.o. female admitted 2018 with back pain, found to have T11 and L3 fractures, scheduled for kyphoplasty.  Developed persistent hypotension and had to be transferred to the ICU.  History of alcoholic liver disease.    Interval Problem Update  Intubated and sedated on vent  Hgb down to 2.2 acutely  Boyfriend at bedside, discussing plan of care with palliative care RN Dillan.    Consultants/Specialty  Nephrology and gastroenterology  Intensivist    Disposition  Remain in ICU        Review of Systems   Unable to perform ROS: Intubated      Physical Exam  Laboratory/Imaging   Hemodynamics  Temp (24hrs), Av.3 °C (97.3 °F), Min:35.6 °C (96 °F), Max:37.2 °C (99 °F)   Temperature: 36.7 °C (98 °F)  Pulse  Av.5  Min: 67  Max: 114 Heart Rate (Monitored): 97  Blood Pressure: 125/71, NIBP: 125/77 CVP (mm Hg): (!) 276 MM HG    Respiratory  Tineo Vent Mode: APVCMV, Rate (breaths/min): 18, PEEP/CPAP: 12, PEEP/CPAP: 12, FiO2: 100, P Peak (PIP): 27, P MEAN: 18   Respiration: (!) 37, Pulse Oximetry: 97 %     Work Of Breathing / Effort: Vented  RUL Breath Sounds: Crackles, RML Breath Sounds: Crackles, RLL Breath Sounds: Diminished, MITCH Breath Sounds: Crackles, LLL Breath Sounds: Diminished    Fluids    Intake/Output Summary (Last 24 hours) at 10/01/18 1450  Last data filed at 10/01/18 1400   Gross per 24 hour   Intake          9272.86 ml   Output              120 ml   Net          9152.86 ml       Nutrition  Orders Placed This Encounter   Procedures   • Diet NPO     Standing Status:   Standing     Number of Occurrences:   1     Order Specific Question:   Type:     Answer:   Now [1]     Order Specific Question:   Restrict to:     Answer:   Strict [1]     Physical Exam   Constitutional: She appears well-developed and well-nourished. She appears lethargic. No distress. She is sedated and intubated.   HENT:   Mouth/Throat: Oropharynx is  clear and moist. No oropharyngeal exudate.   Eyes: Conjunctivae are normal. Right eye exhibits no discharge. Left eye exhibits no discharge. Scleral icterus is present.   Neck: No JVD present. No tracheal deviation present.   Cardiovascular: Normal rate, regular rhythm and normal heart sounds.    Pulmonary/Chest: Effort normal and breath sounds normal. No stridor. She is intubated. No respiratory distress. She has no wheezes. She has no rales.   Abdominal: Soft. Bowel sounds are normal. She exhibits distension. She exhibits no mass. There is no guarding.   Musculoskeletal: She exhibits no edema.   Neurological: She appears lethargic.   Skin: Skin is warm and dry. She is not diaphoretic. No pallor.   Nursing note and vitals reviewed.      Recent Labs      09/30/18   0014  10/01/18   0543  10/01/18   0945   WBC  4.3*  12.7*  15.6*   RBC  2.42*  0.62*  2.35*   HEMOGLOBIN  8.5*  2.2*  7.4*   HEMATOCRIT  24.9*  6.7*  22.9*   MCV  102.9*  108.1*  97.4   MCH  35.1*  35.5*  31.5   MCHC  34.1  32.8*  32.3*   RDW  52.4*  57.2*  54.5*   PLATELETCT  103*  111*  89*   MPV  10.4  11.2  10.9     Recent Labs      10/01/18   0400  10/01/18   0455  10/01/18   0945   SODIUM  133*  133*  136   POTASSIUM  3.5*  3.4*  3.4*   CHLORIDE  103  103  105   CO2  12*  11*  14*   GLUCOSE  286*  263*  258*   BUN  22  22  21   CREATININE  2.10*  2.07*  1.98*   CALCIUM  8.6  8.4*  8.4*     Recent Labs      09/29/18   0145  09/30/18   0840  10/01/18   0543   APTT   --   48.6*  59.9*   INR  1.60*  1.80*  4.87*     Recent Labs      10/01/18   0543   BNPBTYPENAT  1864*     Recent Labs      09/30/18   1935   TRIGLYCERIDE  48          Assessment/Plan     * Hyponatremia- (present on admission)   Assessment & Plan    Due to liver disease, worsening to 131, unable to diurese with hypotension.        Compression fracture of vertebra (HCC)   Assessment & Plan    Checking  SPEP and vit D, concern for possible  multiple myeloma,  MRI showed acute T11 and L3  vertebral body kyphoplasty cancelled due to decompensated condition.        Hypokalemia   Assessment & Plan    Replacement per ICU protocol.        Alcoholic hepatitis with ascites- (present on admission)   Assessment & Plan    No acute exacerbation, LFT's stable, alcohol level was high, continue close monitoring.   Holding lasix and spironolactone due to hypotension and SHIN  Ammonia level is high continue lactulose  intubated        DNR (do not resuscitate) discussion   Assessment & Plan    Patient's boyfriend contacted her family, they will be coming to see her as soon as possible, family aware that she is critically ill and may not survive until they get here. Agree to continue present treatment but no escalation, eg no dialysis. Family planning to discuss withdrawal of care once SAL (son) and other family have visited.  Palliative care consult  DNR, due to advanced liver disease chest compressions would not result in patient's comfort or improvement in condition  Advanced care planning discussing with her boyfriend and palliative care, intensivist and RN greater than 30 minutes in addition to usual E&M time.        Acute blood loss anemia   Assessment & Plan    Severe, drop overnight to 2.2  GI bleed versus hemolysis, GI consulting  Patient getting 4 units of blood and one of FFP        Acute respiratory failure (HCC)   Assessment & Plan    Patient unable to protect airway due to need for use of sedatives specifically ativan to treat alcohol withdrawal.  Discussed with Dr. Landrum  Intubation 9/30; vent settings per intensivist.  Propofol drip for sedation.  RT protocol.        Alcohol withdrawal syndrome with perceptual disturbance (HCC)   Assessment & Plan    CIWA protocol initiated.  Start neurontin 300 mg tid.  Rally bag now, po vitamins in am.  Ativan prn on a sliding scale po or iv as indicated by CIWA score.        Coagulopathy (HCC)   Assessment & Plan    Due to liver disease, inr 1.8  today.  Thrombocytopenia worsening 103.         Hypotension   Assessment & Plan    Worsening, possibly decompensated liver disease, no clear infectious etiology.  On three pressors  Holding all diuretics.  Cover for undiscovered infectious source such as SBP with ceftriaxone. Blood cultures done, ascitis fluid sent for culture.  Hydrocortisone 100mg every 6 hours, recently patient was on oral steroids, cortisol level 7.  Florinef 0.1 mg daily  Albumin 25% infusion every 6 hours.  Prognosis is grim awaiting family, may withdraw care, DNR.            SHIN (acute kidney injury) (HCC)   Assessment & Plan    Possible ATN due to hypotension versus hepatorenal syndrome  Nephrology following  Creatinine slightly improved 2.07 but oliguric        Hypothyroidism   Assessment & Plan    Continue Synthroid.          Quality-Core Measures   Reviewed items::  Labs reviewed, Medications reviewed and Radiology images reviewed  Rhodes catheter::  Critically Ill - Requiring Accurate Measurement of Urinary Output  DVT prophylaxis pharmacological::  Contraindicated - High bleeding risk  DVT prophylaxis - mechanical:  SCDs

## 2018-10-01 NOTE — PROGRESS NOTES
Cortrak Placement    Tube Team verified patient name and medical record number prior to tube placement.  Cortrak tube (55 inches, 10 Burkin[place]) placed at 75 cm in left nare.  Per Cortrak picture, tube appears to be in the stomach.  Nursing Instructions: Awaiting KUB to confirm placement before use for medications or feeding. Once placement confirmed, flush tube with 30 ml of water, and then remove and save stylet, in patient medication drawer.

## 2018-10-01 NOTE — DISCHARGE PLANNING
"Met with S/O Rick Torres at bedside. He is aware of the severity of pt's condition and is tearful, \"she is my soul-mate.\"  Provided emotional support, offered  visit, he declined. Called employer, [employer]   and left message for José Luis [phone], stating that Rick will not be in to work today. Palliative care awaiting call back from son for plan of care.  "

## 2018-10-01 NOTE — THERAPY
Met with RN. Significant decline in medical status, not appropriate to participate with therapies. Likely transitioning to comfort care. Please re-order if change in POC.

## 2018-10-01 NOTE — ASSESSMENT & PLAN NOTE
Patient unable to protect airway due to need for use of sedatives specifically ativan to treat alcohol withdrawal.  Discussed with Dr. Landrum  Intubation 9/30; vent settings per intensivist.  Propofol drip for sedation.  RT protocol.

## 2018-10-01 NOTE — ASSESSMENT & PLAN NOTE
MELD 31, MDF 44  With likely HRS, prognosis extremely poor  Albumin, octreotide, midodrine, PPI  Hold steroids currently  SBP prophylaxis with ceftriaxone times 5-7 days  GI consult ongoing

## 2018-10-01 NOTE — DIETARY
"Nutrition Support Assessment     Nutrition services:   Day 4 of admit.  Felicia Machado is a 53 y.o. female with admitting DX of hyponatremia and hepatorenal syndrome.     Current problem list:  1. Compression fracture of vertebra.  2. Decompensated ascites secondary to alcoholic cirrhosis and alcoholic hepatitis.  3. Adrenal insufficiency.  4. ARF.  5. SHIN.  6. Hypothyroidism.      Assessment:  Estimated Nutritional Needs: based on:   Height: 162.6 cm (5' 4.02\")  Weight: 63.1 kg (139 lb 1.8 oz)  Weight to Use in Calculations: 61.6 kg (135 lb 12.9 oz) - stand up scale wt likely closer to pt's actual wt.  Ideal Body Weight: 54.4 kg (120 lb)  Body mass index is 23.87 kg/m². - WNL.    Calculation/Equation: MSJ x 1.2 = 1449 kcal.  PSU: 1360 kcal (VE: 6.5, Tmax: 37.2).  Total Calories / day: 1450 - 1650 (Calories / k - 27)  Total Grams Protein / day: 74 - 92 (Grams Protein / k.2 - 1.5)     Evaluation:   1. Pt is intubated and sedated and in need of nutrition support - consult received for TFs to start.  2. Gastric Cortrak placed for enteral access.  3. Abdomen is distended.  Pt is positive for ascites.  4. Pt underwent paracentesis yesterday ().  5. Pt is positive 9.2 L per I/Os.  6. Labs: Potassium: 3.4, Glucose: 258, Creat.: 1.98.  7. Meds: Solu-cortef, SSI, Lactulose, Synthroid,  Theragran, Thiamine, Folvite, Protonix 8 mg/hr.  8. Levophed @ 30 mcg/min, Boo-synephrine @ 50 mcg/min, Vasopressin @ 0.03, Propofol @ 8.1 mL/hr, providing 214 kcal.  9. Palliative care consulted and following.  10. Impact Peptide 1.5 is an appropriate formula to meet pt's estimated needs and given pt is on high dose pressors, Impact is a fiber free formula.     Recommendations/Plan:  1. Initiate Impact Peptide 1.5 @ 25 mL/hr and advance to 35 mL/hr (goal rate when Propofol is on), providing 1260 kcal (+kcal from Propofol), 79 gm protein, and 647 mL of free water per day.  2. Once Propofol is discontinued, Impact Peptide @ goal " rate of 40 mL/hr, providing 1440 kcal, 90 gm protein, and 739 mL of free water per day.  3. Fluids per MD.    RD following.

## 2018-10-01 NOTE — PROGRESS NOTES
Late Entry:    0700: Dr. Santa paged re: Dr. Landrum request.    0704 Dr. Severino called to make aware of EKG changes [place] at times.    0745 Dr. Severino and Dr. Landrum at bedside, poc discussed, new orders received and implemented.

## 2018-10-01 NOTE — PROGRESS NOTES
Lab called for redraw of labs r/t abnormal values. Labs redrawn and sent.   Page placed to Dr. Perry

## 2018-10-01 NOTE — PROCEDURES
Procedure Note: Internal Jugular Central Line Placement    Indication for procedure: refractory hypotension.    Consent: Emergent.    Procedure:  The right supraclavicular area was prepped and draped in sterile fashion. Using ultrasound guidance, the internal jugular vein was located. The internal jugular vein was successfully located with the finder needle and and the vein was cannulated using modified Seldinger technique.  All three ports produced non pulsatile, dark red blood and flushed easily with saline.  The central line was sutured into place using two interrupted silk sutures.  Appropriate placement was confirmed with chest xray; no pneumothorax was present.  The patient tolerated the procedure well.  Blood loss was minimal.

## 2018-10-01 NOTE — PROGRESS NOTES
The interventional radiology procedure vertebroplasty has been delayed due to high INR, INR must be 1.5 or below prior to procedure.  Please update patient and family to plan of care.  Any questions please call [phone].

## 2018-10-01 NOTE — PROGRESS NOTES
Critical Care Progress Note    Date of admission  9/27/2018    Chief Complaint  53 y.o. Female with a past medical history of decompensated alcoholic liver cirrhosis with portal hypertension and ascites, osteoporosis, presented to the hospital complaining of low back pain, was transferred to ICU from the floor due to persistent hypotension, therefore ICU consult.     Hospital Course    9/30/2018: transferred to ICU      Interval Problem Update  Reviewed last 24 hour events:    Hgb 2 this AM!  4 blood  (to uncross matched), 2 FFP  Advance to 3 pressors  NE 30, Vaso 0.03, SARTHAK 50  NaAcetate drip  UO low - 75cc  ST - PVCs  SBp 110-130s  Paracentesis 5500 cc yesterday - transudate by fluid analysis  Not following, brainstem reflexes intact  NH3 89  Cortrak  Afebrile  CVC, PIVs  Fent/Propofol  Agitated yesterday -> sedate with Ativan -> intubated  Vent day#2  PEEP 8, fiO2 0.50,   CXR smaller volumes, patchy bilat opacities  Cultures neg  Ceftriaxone  HC  - Cortisol 7  No call back from family yet    Pepcid converted to PPI infusion    Significant other updated at bedside at length  Discussed with the GI early a.m. and again later morning  Concern of intra-abdominal bleeding not gastrointestinal hemorrhage, no bleeding noted from GI tract  Status post large volume paracentesis yesterday query bleeding related to that  Patient too unstable to go for CT scan abdomen to look for hematoma but abdominal exam consistent with this  Agitation or being placed marginally supine triggers hypoxemia  PEEP increased to 12 when FiO2 requirements went up to 100% and the patient desaturated to the 90s  Saturations did improve with increased PEEP  CVP elevated, pulmonary edema appearing fluid coming from ET tube  Urine output low Lasix trial started  Requested max concentration of all fluids    Son finally reached and he was updated by team, severity of condition prompted him to speak with his father, I believe he is  from our  patient and the son is the decision-maker  Son got back to us and requests no escalation of therapy    RT could not obtain blood gas, no pulse and risks and difficult access of groin, I obtained a blood gas with right brachial artery stick myself using ultrasound, again family requested no escalation of therapy and arterial line was not placed    YESTERDAY   - SANCHEZ - hydrocortisone started   - hypotension worsened throughout the day, CVC placed and levo started   - encephalopathy worsened and patient was subsequently intubated    Review of Systems  Review of Systems   Unable to perform ROS: Acuity of condition        Vital Signs for last 24 hours   Temp:  [35.6 °C (96 °F)-36.8 °C (98.2 °F)] 35.6 °C (96.1 °F)  Pulse:  [] 101  Resp:  [15-18] 18    Hemodynamic parameters for last 24 hours       Vent Settings for last 24 hours  Tineo Vent Mode: APVCMV  Rate (breaths/min):  [18] 18  PEEP/CPAP:  [8] 8  FiO2:  [] 50  P Peak (PIP):  [15-28] 16  P MEAN:  [9.9-12] 9.9    Physical Exam   Physical Exam   Constitutional: She appears distressed. She is sedated and intubated.   HENT:   Head: Normocephalic and atraumatic.   Right Ear: External ear normal.   Left Ear: External ear normal.   Nose: Nose normal.   Eyes: Pupils are equal, round, and reactive to light. Conjunctivae are normal. Scleral icterus is present.   Neck: Neck supple. No JVD present. No tracheal deviation present.   Cardiovascular: Regular rhythm, intact distal pulses and normal pulses.   No extrasystoles are present. Tachycardia present.  Exam reveals no gallop and no friction rub.    No murmur heard.  Pulmonary/Chest: No accessory muscle usage. She is intubated. She is in respiratory distress. She has decreased breath sounds in the right lower field and the left lower field. She has no wheezes. She has rhonchi. She has rales in the right middle field, the right lower field, the left middle field and the left lower field.   Abdominal: Bowel sounds  are normal. She exhibits distension (Firm without obvious fluid wave, by report significantly more distended than yesterday). She exhibits no abdominal bruit and no pulsatile midline mass. There is tenderness. There is guarding. There is no rebound and no CVA tenderness.   Musculoskeletal: Normal range of motion. She exhibits no tenderness or deformity.   Neurological: She is disoriented and unresponsive. She displays no tremor. No cranial nerve deficit. She exhibits normal muscle tone. She displays no seizure activity. Coordination normal. GCS eye subscore is 3. GCS verbal subscore is 2. GCS motor subscore is 5.   Skin: Skin is warm. No rash noted. She is diaphoretic. No cyanosis. There is pallor (Improved after transfusion). Nails show no clubbing.   Jaundice   Psychiatric: She is noncommunicative.   Nursing note and vitals reviewed.      Medications  Current Facility-Administered Medications   Medication Dose Route Frequency Provider Last Rate Last Dose   • lactulose 20 GM/30ML solution 30 mL  30 mL Oral TID Kit Sprague M.D.   30 mL at 10/01/18 0514   • midodrine (PROAMATINE) tablet 10 mg  10 mg Oral TID WITH MEALS Carlos Perry M.D.   Stopped at 09/30/18 1730   • fludrocortisone (FLORINEF) tablet 0.2 mg  0.2 mg Oral QAM Carlos Perry M.D.   0.2 mg at 10/01/18 0514   • thiamine tablet 100 mg  100 mg Oral DAILY Carlos Perry M.D.   100 mg at 09/30/18 0557   • folic acid (FOLVITE) tablet 1 mg  1 mg Oral DAILY Carlos Perry M.D.   1 mg at 10/01/18 0514   • hydrocortisone sodium succinate PF (SOLU-CORTEF) 100 MG injection 50 mg  50 mg Intravenous Q6HRS Clinton Espinal Jr., D.O.   50 mg at 10/01/18 0514   • octreotide (SANDOSTATIN) 1,250 mcg in  mL Infusion  50 mcg/hr Intravenous Continuous Bayron Santa D.O. 10 mL/hr at 10/01/18 0000 50 mcg/hr at 10/01/18 0000   • LORazepam (ATIVAN) tablet 0.5 mg  0.5 mg Oral Q4HRS PRN Haven Maycol, M.D.       • LORazepam (ATIVAN) tablet 1 mg  1 mg Oral  Q4HRS PRN Nataliya Severino M.D.        Or   • LORazepam (ATIVAN) injection 0.5 mg  0.5 mg Intravenous Q4HRS PRGEOFF Severino M.D.       • LORazepam (ATIVAN) tablet 2 mg  2 mg Oral Q2HRS PRN Nataliya Severino M.D.        Or   • LORazepam (ATIVAN) injection 1 mg  1 mg Intravenous Q2HRS PRN Nataliya Severino M.D.       • LORazepam (ATIVAN) tablet 3 mg  3 mg Oral Q HOUR PRN Nataliya Severino M.D.        Or   • LORazepam (ATIVAN) injection 1.5 mg  1.5 mg Intravenous Q HOUR PRGEOFF Severino M.D.       • LORazepam (ATIVAN) tablet 4 mg  4 mg Oral Q15 MIN PRN Nataliya Severino M.D.        Or   • LORazepam (ATIVAN) injection 2 mg  2 mg Intravenous Q15 MIN PRGEOFF Severino M.D.   2 mg at 09/30/18 1309   • thiamine tablet 100 mg  100 mg Oral DAILY Nataliya Severino M.D.   100 mg at 10/01/18 0514    And   • multivitamin (THERAGRAN) tablet 1 Tab  1 Tab Oral DAILY Nataliya Severino M.D.   1 Tab at 10/01/18 0514    And   • folic acid (FOLVITE) tablet 1 mg  1 mg Oral DAILY Nataliya Severino M.D.       • labetalol (NORMODYNE,TRANDATE) injection 10 mg  10 mg Intravenous Q HOUR PRN Nataliya Severino M.D.        Or   • labetalol (NORMODYNE) tablet 200 mg  200 mg Oral Q6HRS PRN Nataliya Severino M.D.       • gabapentin (NEURONTIN) capsule 300 mg  300 mg Oral TID Nataliya Severino M.D.   300 mg at 10/01/18 0514   • norepinephrine (LEVOPHED) 8 mg in  mL Infusion  0-30 mcg/min Intravenous Continuous Nataliya Severino M.D. 56.3 mL/hr at 10/01/18 0514 30 mcg/min at 10/01/18 0514   • famotidine (PEPCID) tablet 20 mg  20 mg Oral DAILY Clinton Espinal Jr., D.O.        Or   • famotidine (PEPCID) injection 20 mg  20 mg Intravenous DAILY DELMAR Chávez Jr.OKate   20 mg at 10/01/18 0514   • MD Alert...ICU Electrolyte Replacement per Pharmacy   Other pharmacy to dose JOSE F Chávez Jr..PEDRO.       • fentaNYL (SUBLIMAZE) injection 25 mcg  25 mcg Intravenous Q HOUR PRN Clinton Espinal Jr., D.O.        Or   • fentaNYL (SUBLIMAZE) injection 50 mcg  50 mcg Intravenous Q HOUR  PRN Clinton Espinal Jr., D.O.        Or   • fentaNYL (SUBLIMAZE) injection 100 mcg  100 mcg Intravenous Q HOUR PRN JOSE F Chávez Jr..O.       • ipratropium-albuterol (DUONEB) nebulizer solution  3 mL Nebulization Q2HRS PRN (RT) JOSE F Chváez Jr..O.       • insulin regular (HUMULIN R) injection 1-6 Units  1-6 Units Subcutaneous Q6HRS JOSE F Chávez Jr..OKate   3 Units at 10/01/18 0515    And   • glucose 4 g chewable tablet 16 g  16 g Oral Q15 MIN PRN JOSE F Chávez Jr..OKate        And   • dextrose 50% (D50W) injection 25 mL  25 mL Intravenous Q15 MIN PRN JOSE F Chávez Jr..O.       • Respiratory Care per Protocol   Nebulization Continuous RT Nataliya Severino M.D.       • propofol (DIPRIVAN) injection  0-80 mcg/kg/min Intravenous Continuous Nataliya Severino M.D. 4.1 mL/hr at 10/01/18 0400 10 mcg/kg/min at 10/01/18 0400   • vasopressin (VASOSTRICT) 20 Units in  mL Infusion  0.03 Units/min Intravenous Continuous Carlos Perry M.D. 9 mL/hr at 10/01/18 0200 0.03 Units/min at 10/01/18 0200   • phenylephrine (SARTHAK-SYNEPHRINE) 40,000 mcg in  mL Infusion  0-300 mcg/min Intravenous Continuous Carlos Perry M.D. 18.8 mL/hr at 10/01/18 0415 50 mcg/min at 10/01/18 0415   • fentaNYL (SUBLIMAZE) 50 mcg/mL in 50mL (Continuous Infusion)   Intravenous Continuous Carlos Prery M.D. 0.5 mL/hr at 10/01/18 0000 25 mcg/hr at 10/01/18 0000   • sodium acetate 150 mEq in D5W 1,000 mL infusion   Intravenous Continuous Carlos Perry M.D. 100 mL/hr at 10/01/18 0000     • vitamin D (Ergocalciferol) (DRISDOL) capsule 50,000 Units  50,000 Units Oral Q7 DAYS Kevin Ibrahim M.D.   50,000 Units at 09/29/18 0840   • cefTRIAXone (ROCEPHIN) 1 g in  mL IVPB  1 g Intravenous Q24HRS Kevin Ibrahim M.D. 200 mL/hr at 10/01/18 0514 1 g at 10/01/18 0514   • albumin human 25% solution 50 g  50 g Intravenous Once PRN Kevin Ibrahim M.D.       • albumin human 25% solution 62.5 g  62.5 g Intravenous  Once PRN Kevin Ibrahim M.D.       • levothyroxine (SYNTHROID) tablet 25 mcg  25 mcg Oral AM ES Rani Medrano M.D.   25 mcg at 10/01/18 0514   • FLUoxetine (PROZAC) capsule 20 mg  20 mg Oral DAILY Rani Medrano M.D.   20 mg at 10/01/18 0514   • ondansetron (ZOFRAN) syringe/vial injection 4 mg  4 mg Intravenous Q4HRS PRN Rani Medrano M.D.       • ondansetron (ZOFRAN ODT) dispertab 4 mg  4 mg Oral Q4HRS PRN Rani Medrano M.D.       • promethazine (PHENERGAN) tablet 12.5-25 mg  12.5-25 mg Oral Q4HRS PRN Rani Medrano M.D.       • promethazine (PHENERGAN) suppository 12.5-25 mg  12.5-25 mg Rectal Q4HRS PRN Rani Medrano M.D.       • prochlorperazine (COMPAZINE) injection 5-10 mg  5-10 mg Intravenous Q4HRS PRN Rani Medrano M.D.           Fluids    Intake/Output Summary (Last 24 hours) at 10/01/18 0536  Last data filed at 10/01/18 0200   Gross per 24 hour   Intake           4908.7 ml   Output              300 ml   Net           4608.7 ml       Laboratory  Recent Results (from the past 48 hour(s))   BASIC METABOLIC PANEL    Collection Time: 09/29/18  9:22 AM   Result Value Ref Range    Sodium 127 (L) 135 - 145 mmol/L    Potassium 3.7 3.6 - 5.5 mmol/L    Chloride 97 96 - 112 mmol/L    Co2 20 20 - 33 mmol/L    Glucose 86 65 - 99 mg/dL    Bun 17 8 - 22 mg/dL    Creatinine 1.96 (H) 0.50 - 1.40 mg/dL    Calcium 8.5 8.5 - 10.5 mg/dL    Anion Gap 10.0 0.0 - 11.9   CORTISOL    Collection Time: 09/29/18  9:22 AM   Result Value Ref Range    Cortisol 7.3 0.0 - 23.0 ug/dL   ESTIMATED GFR    Collection Time: 09/29/18  9:22 AM   Result Value Ref Range    GFR If  32 (A) >60 mL/min/1.73 m 2    GFR If Non  27 (A) >60 mL/min/1.73 m 2   Urine Sodium Random    Collection Time: 09/29/18 12:50 PM   Result Value Ref Range    Sodium, Urine -per volume <10 mmol/L   Urine Creatinine Random    Collection Time: 09/29/18 12:50 PM   Result Value Ref Range    Creatinine, Random Urine 150.20 mg/dL    OSMOLALITY URINE    Collection Time: 09/29/18 12:50 PM   Result Value Ref Range    Osmolality Urine 257 (L) 300 - 900 mOsm/kg H2O   URINALYSIS    Collection Time: 09/29/18 12:50 PM   Result Value Ref Range    Color DK Yellow     Character Clear     Specific Gravity 1.014 <1.035    Ph 5.5 5.0 - 8.0    Glucose Negative Negative mg/dL    Ketones Negative Negative mg/dL    Protein 30 (A) Negative mg/dL    Bilirubin Moderate (A) Negative    Urobilinogen, Urine 1.0 Negative    Nitrite Positive (A) Negative    Leukocyte Esterase Trace (A) Negative    Occult Blood Large (A) Negative    Micro Urine Req Microscopic    URINE MICROSCOPIC (W/UA)    Collection Time: 09/29/18 12:50 PM   Result Value Ref Range    WBC 0-2 /hpf    RBC 20-50 (A) /hpf    Bacteria Rare (A) None /hpf    Epithelial Cells Negative /hpf    Hyaline Cast 0-2 /lpf   BASIC METABOLIC PANEL    Collection Time: 09/29/18  2:36 PM   Result Value Ref Range    Sodium 127 (L) 135 - 145 mmol/L    Potassium 3.7 3.6 - 5.5 mmol/L    Chloride 99 96 - 112 mmol/L    Co2 20 20 - 33 mmol/L    Glucose 103 (H) 65 - 99 mg/dL    Bun 19 8 - 22 mg/dL    Creatinine 2.25 (H) 0.50 - 1.40 mg/dL    Calcium 8.1 (L) 8.5 - 10.5 mg/dL    Anion Gap 8.0 0.0 - 11.9   ESTIMATED GFR    Collection Time: 09/29/18  2:36 PM   Result Value Ref Range    GFR If  27 (A) >60 mL/min/1.73 m 2    GFR If Non African American 23 (A) >60 mL/min/1.73 m 2   OSMOLALITY SERUM    Collection Time: 09/29/18  2:39 PM   Result Value Ref Range    Osmolality Serum 268 (L) 278 - 298 mOsm/kg H2O   BLOOD CULTURE    Collection Time: 09/29/18  2:39 PM   Result Value Ref Range    Significant Indicator NEG     Source BLD     Site PERIPHERAL     Blood Culture       No Growth    Note: Blood cultures are incubated for 5 days and  are monitored continuously.Positive blood cultures  are called to the RN and reported as soon as  they are identified.     BLOOD CULTURE    Collection Time: 09/29/18  2:45 PM   Result  Value Ref Range    Significant Indicator NEG     Source BLD     Site PERIPHERAL     Blood Culture       No Growth    Note: Blood cultures are incubated for 5 days and  are monitored continuously.Positive blood cultures  are called to the RN and reported as soon as  they are identified.     BASIC METABOLIC PANEL    Collection Time: 09/29/18  5:34 PM   Result Value Ref Range    Sodium 126 (L) 135 - 145 mmol/L    Potassium 3.8 3.6 - 5.5 mmol/L    Chloride 98 96 - 112 mmol/L    Co2 18 (L) 20 - 33 mmol/L    Glucose 132 (H) 65 - 99 mg/dL    Bun 19 8 - 22 mg/dL    Creatinine 2.25 (H) 0.50 - 1.40 mg/dL    Calcium 8.3 (L) 8.5 - 10.5 mg/dL    Anion Gap 10.0 0.0 - 11.9   Fluid Albumin    Collection Time: 09/29/18  5:34 PM   Result Value Ref Range    Albumin 2.4 (L) 3.2 - 4.9 g/dL   LACTIC ACID    Collection Time: 09/29/18  5:34 PM   Result Value Ref Range    Lactic Acid 1.9 0.5 - 2.0 mmol/L   ESTIMATED GFR    Collection Time: 09/29/18  5:34 PM   Result Value Ref Range    GFR If  27 (A) >60 mL/min/1.73 m 2    GFR If Non African American 23 (A) >60 mL/min/1.73 m 2   BASIC METABOLIC PANEL    Collection Time: 09/30/18 12:14 AM   Result Value Ref Range    Sodium 126 (L) 135 - 145 mmol/L    Potassium 3.6 3.6 - 5.5 mmol/L    Chloride 99 96 - 112 mmol/L    Co2 19 (L) 20 - 33 mmol/L    Glucose 161 (H) 65 - 99 mg/dL    Bun 21 8 - 22 mg/dL    Creatinine 2.30 (H) 0.50 - 1.40 mg/dL    Calcium 8.6 8.5 - 10.5 mg/dL    Anion Gap 8.0 0.0 - 11.9   AMMONIA    Collection Time: 09/30/18 12:14 AM   Result Value Ref Range    Ammonia 68 (H) 11 - 45 umol/L   CBC WITH DIFFERENTIAL    Collection Time: 09/30/18 12:14 AM   Result Value Ref Range    WBC 4.3 (L) 4.8 - 10.8 K/uL    RBC 2.42 (L) 4.20 - 5.40 M/uL    Hemoglobin 8.5 (L) 12.0 - 16.0 g/dL    Hematocrit 24.9 (L) 37.0 - 47.0 %    .9 (H) 81.4 - 97.8 fL    MCH 35.1 (H) 27.0 - 33.0 pg    MCHC 34.1 33.6 - 35.0 g/dL    RDW 52.4 (H) 35.9 - 50.0 fL    Platelet Count 103 (L) 164 - 446  K/uL    MPV 10.4 9.0 - 12.9 fL    Neutrophils-Polys 85.20 (H) 44.00 - 72.00 %    Lymphocytes 8.80 (L) 22.00 - 41.00 %    Monocytes 4.60 0.00 - 13.40 %    Eosinophils 0.00 0.00 - 6.90 %    Basophils 0.70 0.00 - 1.80 %    Immature Granulocytes 0.70 0.00 - 0.90 %    Nucleated RBC 0.00 /100 WBC    Neutrophils (Absolute) 3.69 2.00 - 7.15 K/uL    Lymphs (Absolute) 0.38 (L) 1.00 - 4.80 K/uL    Monos (Absolute) 0.20 0.00 - 0.85 K/uL    Eos (Absolute) 0.00 0.00 - 0.51 K/uL    Baso (Absolute) 0.03 0.00 - 0.12 K/uL    Immature Granulocytes (abs) 0.03 0.00 - 0.11 K/uL    NRBC (Absolute) 0.00 K/uL   HEPATIC FUNCTION PANEL    Collection Time: 09/30/18 12:14 AM   Result Value Ref Range    Alkaline Phosphatase 138 (H) 30 - 99 U/L    AST(SGOT) 36 12 - 45 U/L    ALT(SGPT) 12 2 - 50 U/L    Total Bilirubin 4.6 (H) 0.1 - 1.5 mg/dL    Direct Bilirubin 2.6 (H) 0.1 - 0.5 mg/dL    Indirect Bilirubin 2.0 (H) 0.0 - 1.0 mg/dL    Albumin 2.6 (L) 3.2 - 4.9 g/dL    Total Protein 5.9 (L) 6.0 - 8.2 g/dL   ESTIMATED GFR    Collection Time: 09/30/18 12:14 AM   Result Value Ref Range    GFR If  27 (A) >60 mL/min/1.73 m 2    GFR If Non African American 22 (A) >60 mL/min/1.73 m 2   LACTIC ACID    Collection Time: 09/30/18  3:17 AM   Result Value Ref Range    Lactic Acid 2.2 (H) 0.5 - 2.0 mmol/L   BASIC METABOLIC PANEL    Collection Time: 09/30/18  8:40 AM   Result Value Ref Range    Sodium 129 (L) 135 - 145 mmol/L    Potassium 3.3 (L) 3.6 - 5.5 mmol/L    Chloride 100 96 - 112 mmol/L    Co2 18 (L) 20 - 33 mmol/L    Glucose 135 (H) 65 - 99 mg/dL    Bun 22 8 - 22 mg/dL    Creatinine 2.14 (H) 0.50 - 1.40 mg/dL    Calcium 8.8 8.5 - 10.5 mg/dL    Anion Gap 11.0 0.0 - 11.9   PROCALCITONIN    Collection Time: 09/30/18  8:40 AM   Result Value Ref Range    Procalcitonin 0.27 (H) <0.25 ng/mL   VITAMIN B12    Collection Time: 09/30/18  8:40 AM   Result Value Ref Range    Vitamin B12 -True Cobalamin >1500 (H) 211 - 911 pg/mL   PROTHROMBIN TIME     Collection Time: 09/30/18  8:40 AM   Result Value Ref Range    PT 21.0 (H) 12.0 - 14.6 sec    INR 1.80 (H) 0.87 - 1.13   APTT    Collection Time: 09/30/18  8:40 AM   Result Value Ref Range    APTT 48.6 (H) 24.7 - 36.0 sec   TSH    Collection Time: 09/30/18  8:40 AM   Result Value Ref Range    TSH 2.120 0.380 - 5.330 uIU/mL   TRIIDOTHYRONINE    Collection Time: 09/30/18  8:40 AM   Result Value Ref Range    T3 38.6 (L) 60.0 - 181.0 ng/dL   FREE THYROXINE    Collection Time: 09/30/18  8:40 AM   Result Value Ref Range    Free T-4 1.08 0.53 - 1.43 ng/dL   ESTIMATED GFR    Collection Time: 09/30/18  8:40 AM   Result Value Ref Range    GFR If  29 (A) >60 mL/min/1.73 m 2    GFR If Non  24 (A) >60 mL/min/1.73 m 2   Fluid Cell Count w/Diff    Collection Time: 09/30/18  1:53 PM   Result Value Ref Range    Fluid Type Paracentesis     Color-Body Fluid Yellow     Character-Body Fluid Clear     Total RBC Count <2000 cells/uL    Total  cells/uL    Polys 54 %    Lymphs 21 %    Mononuclear Cells - Fluid 19 %    Mesothelial Cells - CSF 2 %    Fluid Histiocyte 1 %    Eosinophils - CSF 2 %    Fluid Basophil 1 %   Fluid Total Protein    Collection Time: 09/30/18  1:53 PM   Result Value Ref Range    Fluid Type Paracentesis     Total Protein Fluid 1.3 g/dL   FLUID ALBUMIN    Collection Time: 09/30/18  1:53 PM   Result Value Ref Range    Albumin <1.0 g/dL   GRAM STAIN    Collection Time: 09/30/18  1:53 PM   Result Value Ref Range    Significant Indicator .     Source BF     Site Ascites Fluid     Gram Stain Result Rare WBCs.  No organisms seen.      Phosphorus: STAT    Collection Time: 09/30/18  3:45 PM   Result Value Ref Range    Phosphorus 5.5 (H) 2.5 - 4.5 mg/dL   Ammonia: STAT    Collection Time: 09/30/18  3:45 PM   Result Value Ref Range    Ammonia 46 (H) 11 - 45 umol/L   BASIC METABOLIC PANEL    Collection Time: 09/30/18  3:45 PM   Result Value Ref Range    Sodium 131 (L) 135 - 145 mmol/L     Potassium 3.6 3.6 - 5.5 mmol/L    Chloride 102 96 - 112 mmol/L    Co2 16 (L) 20 - 33 mmol/L    Glucose 164 (H) 65 - 99 mg/dL    Bun 22 8 - 22 mg/dL    Creatinine 2.11 (H) 0.50 - 1.40 mg/dL    Calcium 8.5 8.5 - 10.5 mg/dL    Anion Gap 13.0 (H) 0.0 - 11.9   ESTIMATED GFR    Collection Time: 09/30/18  3:45 PM   Result Value Ref Range    GFR If African American 30 (A) >60 mL/min/1.73 m 2    GFR If Non  24 (A) >60 mL/min/1.73 m 2   ISTAT ARTERIAL BLOOD GAS    Collection Time: 09/30/18  6:31 PM   Result Value Ref Range    Ph 7.312 (L) 7.400 - 7.500    Pco2 29.6 26.0 - 37.0 mmHg    Po2 107 (H) 64 - 87 mmHg    Tco2 16 (L) 20 - 33 mmol/L    S02 98 93 - 99 %    Hco3 15.0 (L) 17.0 - 25.0 mmol/L    BE -10 (L) -4 - 3 mmol/L    Body Temp 97.0 F degrees    O2 Therapy 80 %    iPF Ratio 134     Ph Temp Eh 7.324 (L) 7.400 - 7.500    Pco2 Temp Co 28.5 26.0 - 37.0 mmHg    Po2 Temp Cor 102 (H) 64 - 87 mmHg    Specimen Arterial     Action Range Triggered NO     Inst. Qualified Patient YES    ACCU-CHEK GLUCOSE    Collection Time: 09/30/18  6:40 PM   Result Value Ref Range    Glucose - Accu-Ck 203 (H) 65 - 99 mg/dL   Triglycerides Starting now and then Every 3 Days    Collection Time: 09/30/18  7:35 PM   Result Value Ref Range    Triglycerides 48 0 - 149 mg/dL   BASIC METABOLIC PANEL    Collection Time: 09/30/18  7:35 PM   Result Value Ref Range    Sodium 130 (L) 135 - 145 mmol/L    Potassium 3.5 (L) 3.6 - 5.5 mmol/L    Chloride 105 96 - 112 mmol/L    Co2 14 (L) 20 - 33 mmol/L    Glucose 269 (H) 65 - 99 mg/dL    Bun 22 8 - 22 mg/dL    Creatinine 2.04 (H) 0.50 - 1.40 mg/dL    Calcium 7.8 (L) 8.5 - 10.5 mg/dL    Anion Gap 11.0 0.0 - 11.9   ESTIMATED GFR    Collection Time: 09/30/18  7:35 PM   Result Value Ref Range    GFR If  31 (A) >60 mL/min/1.73 m 2    GFR If Non African American 25 (A) >60 mL/min/1.73 m 2   BASIC METABOLIC PANEL    Collection Time: 10/01/18 12:00 AM   Result Value Ref Range    Sodium 131  (L) 135 - 145 mmol/L    Potassium 3.3 (L) 3.6 - 5.5 mmol/L    Chloride 104 96 - 112 mmol/L    Co2 13 (L) 20 - 33 mmol/L    Glucose 321 (H) 65 - 99 mg/dL    Bun 22 8 - 22 mg/dL    Creatinine 2.06 (H) 0.50 - 1.40 mg/dL    Calcium 8.2 (L) 8.5 - 10.5 mg/dL    Anion Gap 14.0 (H) 0.0 - 11.9   ESTIMATED GFR    Collection Time: 10/01/18 12:00 AM   Result Value Ref Range    GFR If African American 30 (A) >60 mL/min/1.73 m 2    GFR If Non African American 25 (A) >60 mL/min/1.73 m 2   ACCU-CHEK GLUCOSE    Collection Time: 10/01/18 12:02 AM   Result Value Ref Range    Glucose - Accu-Ck 294 (H) 65 - 99 mg/dL   Magnesium: AM Daily x 3 days    Collection Time: 10/01/18  4:00 AM   Result Value Ref Range    Magnesium 1.6 1.5 - 2.5 mg/dL   Phosphorus: AM Daily x 3 days    Collection Time: 10/01/18  4:00 AM   Result Value Ref Range    Phosphorus 5.8 (H) 2.5 - 4.5 mg/dL   Ammonia: AM Daily x 3 days    Collection Time: 10/01/18  4:00 AM   Result Value Ref Range    Ammonia 89 (H) 11 - 45 umol/L   Comp Metabolic Panel (CMP) - Every Monday    Collection Time: 10/01/18  4:00 AM   Result Value Ref Range    Sodium 133 (L) 135 - 145 mmol/L    Potassium 3.5 (L) 3.6 - 5.5 mmol/L    Chloride 103 96 - 112 mmol/L    Co2 12 (L) 20 - 33 mmol/L    Anion Gap 18.0 (H) 0.0 - 11.9    Glucose 286 (H) 65 - 99 mg/dL    Bun 22 8 - 22 mg/dL    Creatinine 2.10 (H) 0.50 - 1.40 mg/dL    Calcium 8.6 8.5 - 10.5 mg/dL    AST(SGOT) 106 (H) 12 - 45 U/L    ALT(SGPT) 26 2 - 50 U/L    Alkaline Phosphatase 46 30 - 99 U/L    Total Bilirubin 3.0 (H) 0.1 - 1.5 mg/dL    Albumin 3.9 3.2 - 4.9 g/dL    Total Protein 5.2 (L) 6.0 - 8.2 g/dL    Globulin 1.3 (L) 1.9 - 3.5 g/dL    A-G Ratio 3.0 g/dL   ESTIMATED GFR    Collection Time: 10/01/18  4:00 AM   Result Value Ref Range    GFR If African American 30 (A) >60 mL/min/1.73 m 2    GFR If Non African American 25 (A) >60 mL/min/1.73 m 2   ISTAT ARTERIAL BLOOD GAS    Collection Time: 10/01/18  4:20 AM   Result Value Ref Range    Ph  7.309 (L) 7.400 - 7.500    Pco2 21.4 (L) 26.0 - 37.0 mmHg    Po2 97 (H) 64 - 87 mmHg    Tco2 11 (L) 20 - 33 mmol/L    S02 97 93 - 99 %    Hco3 10.8 (L) 17.0 - 25.0 mmol/L    BE -15 (L) -4 - 3 mmol/L    Body Temp 98.0 F degrees    O2 Therapy 50 %    iPF Ratio 194     Ph Temp Eh 7.313 (L) 7.400 - 7.500    Pco2 Temp Co 21.1 (L) 26.0 - 37.0 mmHg    Po2 Temp Cor 95 (H) 64 - 87 mmHg    Specimen Arterial     Action Range Triggered NO     Inst. Qualified Patient YES        Imaging  X-Ray:  I have personally reviewed the images and compared with prior images.    Assessment/Plan  * Acute respiratory failure (HCC)   Assessment & Plan    Intubated 9/30 for airway protection  Developing pulmonary edema!  Possibly both volume overload cardiogenic as well as ARDS  Serial ABG/chest x-ray and ventilator mechanics review  RT/O2 protocols  Titration of ventilator therapy based on ABGs and patient's status  Increase PEEP to 12 today, appears to be recruiting and oxygenation is improved  Sedation as tolerated/indicated  HOB >30 degrees and peridex for VAP prevention  Pepcid for GI prophylaxis -> PPI drip  SAT/SBT when able (ABCDEF Bundle)  Early mobility when clinically appropriate        Acute blood loss anemia   Assessment & Plan    Transfuse to hemoglobin of 7  Serial CBC  Correct coagulopathy and platelet dysfunction as clinically appropriate  Initially suspecting variceal bleed and GI repeat contacted early a.m. 10/1  No significant bleeding via GI tract however, abdomen distended, thoracentesis performed 9/30  Query bleeding related to inadvertent rupture of abdominal varix?  Versus other intra-abdominal process  Curb sided surgery, both agree patient is not a surgical candidate, patient not in a candidate to transport to CT scan at this time for imaging because she is too unstable from a hemodynamic and respiratory perspective!        Hypotension   Assessment & Plan    2/2 decompensated intra-abdominal bleeding and cirrhosis,  less likely sepsis  Resuscitating with blood products  Titrating Boo-Synephrine, vasopressin and norepinephrine  Albumin, midodrine          Decompensated ascites secondary to alcoholic cirrhosis and alcoholic hepatitis (HCC)- (present on admission)   Assessment & Plan    MELD 31, MDF 44  With likely HRS  Albumin, octreotide, midodrine  Hold steroids currently  SBP prophylaxis with ceftriaxone times 5-7 days  GI consult ongoing        Acute adrenal insufficiency (HCC)   Assessment & Plan    Hydrocortisone 50 mg q6 hours, continue        Alcohol withdrawal syndrome with perceptual disturbance (HCC)   Assessment & Plan    D/c CIWA protocols  Propofol on ventilator but if significant hypotension will switch to dexmedetomidine and fentanyl as tolerated  Patient not a good candidate for VPA with her cirrhosis but consider gabapentin as an adjunct  Rally vitamins        Coagulopathy (HCC)   Assessment & Plan    2/2 cirrhosis  Monitor for bleeding, reverse if needed  Significant platelet dysfunction identified by platelet mapping, transfuse platelets        SHIN (acute kidney injury) (HCC)   Assessment & Plan    ?HRS vs ATN  IVF  renal dose meds, avoid nephrotoxins  Strict I/Os  Follow renal function  urine studies  Octreotide, albumin  Not a good candidate for RRT, will obtain nephrology consultation if family requests after further review with him        Alcoholic hepatitis with ascites- (present on admission)   Assessment & Plan    Serial paracentesis, status post large volume paracentesis 9/30  Lasix/Spironalactone  GI consult        Compression fracture of vertebra (HCC)   Assessment & Plan    Pain control, kyphoplasty on hold        Hypothyroidism- (present on admission)   Assessment & Plan    synthroid        Hypokalemia   Assessment & Plan    Replete to maintain >4, holding electrolyte replacement protocol due to decreased urine output  Monitor and replete magnesium, calcium and phosphorus as well as clinically  appropriate        Thrombocytopenia (HCC)- (present on admission)   Assessment & Plan    Serial CBC  Transfuse per conservative transfusion policies  Monitor for platelet dysfunction and transfuse for that as well as absolute levels of platelet count        Hyponatremia- (present on admission)   Assessment & Plan    2/2 cirrhosis and HRS  Avoid hypotonic fluids  Monitor             VTE:  Contraindicated  Ulcer: PPI  Lines: Central Line  Ongoing indication addressed    I have performed a physical exam and reviewed and updated ROS and Plan today (10/1/2018). In review of yesterday's note (9/30/2018), there are no changes except as documented above.     Discussed patient condition and risk of morbidity and/or mortality with Hospitalist, Family, RN, RT, Pharmacy, Dietary, , Code status disscussed, Charge nurse / hot rounds, Patient and GI  The patient remains critically ill.  Critical care time = 85 minutes in directly providing and coordinating critical care and extensive data review.  No time overlap and excludes procedures.

## 2018-10-01 NOTE — PROGRESS NOTES
Hematology called RN for lab value concerns. RN to draw blood peripherally.   Page sent to Dr. Perry to update on patient's morning ABGs and to receive orders. Gave description of blood to MD. Will update after confirming lab values.

## 2018-10-01 NOTE — CARE PLAN
Problem: Pain Management  Goal: Pain level will decrease to patient's comfort goal  Use pharmacological and nonpharmacological methods to control pain.

## 2018-10-01 NOTE — ASSESSMENT & PLAN NOTE
Patient's boyfriend contacted her family, they will be coming to see her as soon as possible, family aware that she is critically ill and may not survive until they get here. Agree to continue present treatment but no escalation, eg no dialysis. Family planning to discuss withdrawal of care once SAL (son) and other family have visited.  Palliative care consult  DNR, due to advanced liver disease chest compressions would not result in patient's comfort or improvement in condition  Advanced care planning discussing with her boyfriend and palliative care, intensivist and RN greater than 30 minutes in addition to usual E&M time.

## 2018-10-01 NOTE — PROGRESS NOTES
"Rick called giving patient's sisters name and number Delmy Pfeiffer [phone].  Rick states Delmy thinks her sister would not want life support.  Please have palliative call sister and Bill to discuss comfort care.  Rick has many questions about \"withdrawing life support\" and wants to make sure he is present at bedside at that time.   "

## 2018-10-01 NOTE — CARE PLAN
Problem: Bowel/Gastric:  Goal: Normal bowel function is maintained or improved  Initiating tube feeding per protocol per MD order. Nutrition consult in place. Titrating lactulose administration to 2-3 BMs/day. Monitoring glucose and labs.     Problem: Respiratory:  Goal: Respiratory status will improve  Collaboration in place with RT and MD. VAP prevention in place. Suctioning prn with q2h oral care. Reviewing serial cxr's. Continuous pulse ox in place. Notifying MD of abnormal ABGs prn.

## 2018-10-01 NOTE — ASSESSMENT & PLAN NOTE
2/2 decompensated intra-abdominal bleeding and cirrhosis, less likely sepsis as well  Patient too unstable to move in bed let alone go for CT abdomen  Curb sided surgery not a candidate for intervention yesterday or today  Good pulses in lower extremities doubt vascular catastrophe of arterial origin  Resuscitating with blood products  Continue titrating vasopressin and norepinephrine  Albumin infusions as needed  Conservative fluid management otherwise secondary to hypoxemia and respiratory failure

## 2018-10-01 NOTE — PROGRESS NOTES
Gastroenterology Progress Note     Author: Chidi Goveaujhelyi   Date & Time Created: 10/1/2018 11:03 AM    Chief Complaint:  ESLD    The patient is a pleasant 53-year-old female with   a long history of alcoholic cirrhosis and alcoholic hepatitis who follows up   with Dr. Santos as an outpatient.  Patient has a long history of   abdominal ascites and has required serial paracentesis for this as she has   failed diuretics as an outpatient.  Patient denies any current nausea,   vomiting, diarrhea, fever, but has had some chills.  Patient admits to   intermittent chest pain, shortness of breath.  Patient denies any blood in the   stool.  Patient reports her bowel movements have been yellow recently.    Patient reports she used to have lower extremity edema; however, this resolved   and her edema now all sits in her abdomen.  Patient has been found to have   worsening creatinine and nephrology was consulted, and we were consulted for   the possibility of hepatorenal syndrome.    Interval History:  Significant drop in H/H last night but no signs of overt GI bleeding  She had a BM last night non bloody, urine is clear and no signs of hematemesis  Her pressures remained stable along with her Hr throughout the event.  She had an H/H of 2.2 and after 4 units improved to 7.4.  She has had several paracentesis as o/p and one here in the hospital which reportedly was not bloody.  Presently she is sedated and is on multiple pressors, intubated and family has been contacted and they request no further intervention at this time.     Review of Systems:  Review of Systems   Unable to perform ROS: Intubated       Physical Exam:  Physical Exam   Constitutional: She appears well-developed.   HENT:   Head: Normocephalic.   Eyes: Pupils are equal, round, and reactive to light.   Cardiovascular: Normal rate.    Pulmonary/Chest: Effort normal.   Abdominal:   Distended, no guarding or rebound + BS + fluid wave       Labs:  Recent  Labs      09/30/18   1831  10/01/18   0420   ISTATAPH  7.312*  7.309*   ISTATAPCO2  29.6  21.4*   ISTATAPO2  107*  97*   ISTATATCO2  16*  11*   [id number]  98  97   ISTATARTHCO3  15.0*  10.8*   ISTATARTBE  -10*  -15*   ISTATTEMP  97.0 F  98.0 F   ISTATFIO2  80  50   ISTATSPEC  Arterial  Arterial   ISTATAPHTC  7.324*  7.313*   [id number]  102*  95*     Recent Labs      10/01/18   0543   BNPBTYPENAT  1864*     Recent Labs      09/30/18   1545   10/01/18   0400  10/01/18   0455  10/01/18   0945   SODIUM  131*   < >  133*  133*  136   POTASSIUM  3.6   < >  3.5*  3.4*  3.4*   CHLORIDE  102   < >  103  103  105   CO2  16*   < >  12*  11*  14*   BUN  22   < >  22  22  21   CREATININE  2.11*   < >  2.10*  2.07*  1.98*   MAGNESIUM   --    --   1.6   --    --    PHOSPHORUS  5.5*   --   5.8*   --    --    CALCIUM  8.5   < >  8.6  8.4*  8.4*    < > = values in this interval not displayed.     Recent Labs      09/30/18   0014   10/01/18   0400  10/01/18   0455  10/01/18   0945   ALTSGPT  12   --   26   --    --    ASTSGOT  36   --   106*   --    --    ALKPHOSPHAT  138*   --   46   --    --    TBILIRUBIN  4.6*   --   3.0*   --    --    DBILIRUBIN  2.6*   --    --    --    --    GLUCOSE  161*   < >  286*  263*  258*    < > = values in this interval not displayed.     Recent Labs      09/29/18   0145  09/30/18   0014  09/30/18   0840  10/01/18   0543  10/01/18   0945   RBC  2.57*  2.42*   --   0.62*  2.35*   HEMOGLOBIN  9.4*  8.5*   --   2.2*  7.4*   HEMATOCRIT  26.4*  24.9*   --   6.7*  22.9*   PLATELETCT  123*  103*   --   111*  89*   PROTHROMBTM  19.1*   --   21.0*  45.3*   --    APTT   --    --   48.6*  59.9*   --    INR  1.60*   --   1.80*  4.87*   --      Recent Labs      09/30/18   0014  09/30/18   1353  10/01/18   0400  10/01/18   0543  10/01/18   0945   WBC  4.3*   --    --   12.7*  15.6*   NEUTSPOLYS  85.20*   --    --   72.70*  74.30*   LYMPHOCYTES  8.80*   --    --   15.40*  12.20*   MONOCYTES  4.60   --    --   10.80   11.40   EOSINOPHILS  0.00  2   --   0.00  0.00   BASOPHILS  0.70   --    --   0.10  0.30   ASTSGOT  36   --   106*   --    --    ALTSGPT  12   --   26   --    --    ALKPHOSPHAT  138*   --   46   --    --    TBILIRUBIN  4.6*   --   3.0*   --    --      Hemodynamics:  Temp (24hrs), Av.2 °C (97.1 °F), Min:35.6 °C (96 °F), Max:37.2 °C (99 °F)  Temperature: 36.8 °C (98.2 °F)  Pulse  Av.5  Min: 67  Max: 114Heart Rate (Monitored): 98  Blood Pressure: 126/61, NIBP: 117/66  CVP (mm Hg): (!) 276 MM HG  Respiratory:  Tineo Vent Mode: APVCMV, Rate (breaths/min): 18, PEEP/CPAP: 12, FiO2: 100, P Peak (PIP): 27, P MEAN: 18 Respiration: (!) 25, Pulse Oximetry: 97 %     Work Of Breathing / Effort: Vented  RUL Breath Sounds: Crackles, RML Breath Sounds: Diminished, RLL Breath Sounds: Diminished, MITCH Breath Sounds: Crackles, LLL Breath Sounds: Diminished  Fluids:    Intake/Output Summary (Last 24 hours) at 10/01/18 1103  Last data filed at 10/01/18 0730   Gross per 24 hour   Intake          6425.62 ml   Output              220 ml   Net          6205.62 ml     Weight: 63.1 kg (139 lb 1.8 oz)  GI/Nutrition:  Orders Placed This Encounter   Procedures   • Diet NPO     Standing Status:   Standing     Number of Occurrences:   1     Order Specific Question:   Type:     Answer:   Now [1]     Order Specific Question:   Restrict to:     Answer:   Strict [1]     Medical Decision Making, by Problem:  Active Hospital Problems    Diagnosis   • *Hyponatremia [E87.1]   • Hypokalemia [E87.6]   • Compression fracture of vertebra (HCC) [M48.50XA]   • Decompensated ascites secondary to alcoholic cirrhosis and alcoholic hepatitis (HCC) [K70.31]   • Alcoholic hepatitis with ascites [K70.11]   • Acute adrenal insufficiency (HCC) [E27.40]   • Acute blood loss anemia [D62]   • DNR (do not resuscitate) discussion [Z71.89]   • Alcohol withdrawal syndrome with perceptual disturbance (HCC) [F10.232]   • Acute respiratory failure (HCC) [J96.00]   •  SHIN (acute kidney injury) (HCC) [N17.9]   • Hypotension [I95.9]   • Coagulopathy (HCC) [D68.9]   • Hypothyroidism [E03.9]       Plan:  No signs of overt GI bleeding at this time, she responded well to transfusions and I would recommend continued supportive care for now.  She is not stable enough for endoscopy at this time.  Given no signs of GI bleeding consider CT of the abd to assess for possible source though her renal function is poor. Family at this time wishes no further intervention at this time per palliative care.  Case discussed with nursing and MD  Call if any issues [phone]  See initial consult     Quality-Core Measures

## 2018-10-01 NOTE — CARE PLAN
Problem: Hemodynamic Status  Goal: Vital Signs and Fluid Balance Management  Vasopressors, fluids, blood products given as needed to maintain hemodynamic stability. Q 5 min NIBP checks, other vitals continuosly.

## 2018-10-01 NOTE — CONSULTS
"Reason for PC Consult: Advance Care Planning    Assessment:  General:  53yr female admitted 9/27/18 c/o back pain x 1 month.  Previous admission for liver cirrhosis in Aug 2018; complained of back pain at that time but stated that it was not addressed.    Past Medical History decompensated liver cirrhosis, paracentesis, ETOH abuse.  Social- Patient resides with her S/O of 3+ yrs in [place].  She has a daughter, son Rahul Damon [phone], sister Delmy Lopez [phone]  Who have been in touch.    Dyspnea: Yes- Intubated  Last BM: 10/01/18- Per RN assessment  Pain: Unable to determine-    Depression: Unable to determine-    Dementia: Unable to Determine;       Spiritual:  Is Rastafarian or spirituality important for coping with this illness? No-    Has a  or spiritual provider visit been requested? No    Palliative Performance Scale: 10%    Advance Directive: None on [facility]le  DPOA: None on File- NOK- Rahul Damon [phone]  POLST: None on File    Code Status: DNR-      Outcome:  PC RN met with the patient, S/O Rick Torres, ([phone] H, [phone] C) at bedside.  Collaboration with Dr. Severino and Eduardo.  Introduced myself, role of Palliative Care in POC and reason for visit. Rick stated understanding that the patient is critically ill, that alcohol use is the cause and she remains on \"life support\".  PC to the patient's son ,Rahul Damon ([phone]).   Introduced myself, role of Palliative Care in POC and reason for PC.  Rahul stated understanding of the patient's critical status and is trying to come to [facility] on [place]. Discussed current life supportive measures being provided and concern that the patient may pass sooner than [place].  We discussed QOL, burden vs benefit of treatment, creating suffering and withdraw of care. Rahul requested to contact his dad, patient's ex-spouse for help with decision making.    Active listening, reflection, and empathic support utilized throughout this encounter. All " questions were answered in full, stated understanding and agreed. Patient/family/spouse denied further needs at this time.  Contact for Palliative Care was provided and patient/family is encourage to call for questions, concerns and/or support.      Plan: Son, daughter, ex- and the patient's sister will arrive tomorrow between 09:00am-12pm.  Rahul requested that we maintain current level of care with no escalation if patient declines.  Anticipate withdraw of care    Recommendations: Hospice/Withdraw of Care/Comfort Care.      Updated: Dr. Landrum, Zulma James, RN, Pat, SW    Thank you for allowing Palliative Care to participate in this patient's care. Please feel free to call [phone] with any questions or concerns.

## 2018-10-01 NOTE — PROGRESS NOTES
Nephrology Daily Progress Note    Date of Service  10/1/2018    Chief Complaint  53 y.o. female admitted 9/27/2018 with H/O ETOH liver cirrhosis, presented with decompensated liver disease, SHIN.    Interval Problem Update  Last 24h events noted , pt developed resp failure, now VDRF, on IV pressors, palliative input noted    Review of Systems  Review of Systems   Unable to perform ROS: Intubated        Physical Exam  Temp:  [35.6 °C (96 °F)-37.2 °C (99 °F)] 36.8 °C (98.2 °F)  Pulse:  [] 95  Resp:  [18-28] 25  BP: (114-131)/(53-67) 126/61    Physical Exam   Constitutional: She appears ill. She appears distressed. She is sedated and intubated.   HENT:   Right Ear: External ear normal.   Left Ear: External ear normal.   Nose: Nose normal.   ETT   Eyes: Conjunctivae are normal. Right eye exhibits no discharge. Left eye exhibits no discharge. No scleral icterus.   Neck: No JVD present. No tracheal deviation present. No thyromegaly present.   Cardiovascular: Normal rate, regular rhythm and normal heart sounds.    No murmur heard.  Pulmonary/Chest: She is intubated. She has no wheezes. She has no rales.   Coarse BS   Abdominal: Bowel sounds are normal. She exhibits no distension. There is no tenderness. There is no rebound.   Musculoskeletal: She exhibits edema. She exhibits no tenderness.   Lymphadenopathy:     She has no cervical adenopathy.   Neurological: She is unresponsive.   Skin: She is not diaphoretic.   Nursing note and vitals reviewed.      Fluids    Intake/Output Summary (Last 24 hours) at 10/01/18 1304  Last data filed at 10/01/18 1200   Gross per 24 hour   Intake          8764.59 ml   Output              150 ml   Net          8614.59 ml       Laboratory  Recent Labs      09/30/18   0014  10/01/18   0543  10/01/18   0945   WBC  4.3*  12.7*  15.6*   RBC  2.42*  0.62*  2.35*   HEMOGLOBIN  8.5*  2.2*  7.4*   HEMATOCRIT  24.9*  6.7*  22.9*   MCV  102.9*  108.1*  97.4   MCH  35.1*  35.5*  31.5   MCHC  34.1   32.8*  32.3*   RDW  52.4*  57.2*  54.5*   PLATELETCT  103*  111*  89*   MPV  10.4  11.2  10.9     Recent Labs      10/01/18   0400  10/01/18   0455  10/01/18   0945   SODIUM  133*  133*  136   POTASSIUM  3.5*  3.4*  3.4*   CHLORIDE  103  103  105   CO2  12*  11*  14*   GLUCOSE  286*  263*  258*   BUN  22  22  21   CREATININE  2.10*  2.07*  1.98*   CALCIUM  8.6  8.4*  8.4*     Recent Labs      09/29/18   0145  09/30/18   0840  10/01/18   0543   APTT   --   48.6*  59.9*   INR  1.60*  1.80*  4.87*     Recent Labs      10/01/18   0543   BNPBTYPENAT  1864*     Recent Labs      09/30/18   1935   TRIGLYCERIDE  48       Imaging    Assessment/Plan  1 SHIN: ATN Vs HRS, no signs of renal recovery  2 VDRF  3 Hypotension  4 Anemia  5 ? GI bleed  6 metabolic acidosis  Plan  no acute need for HD, pt is not a good candidate considering her liver disease poor prognosis  Renal dose all meds  Avoid nephrotoxins  Daily labs  Multi system organ failur  Prognosis poor.  Await family meeting tomorrow  D/W ICU team

## 2018-10-01 NOTE — PROGRESS NOTES
Patient's son, Rahul Damon [phone], to be here on Wednesday per patient's significant other.

## 2018-10-01 NOTE — ASSESSMENT & PLAN NOTE
D/c CIWA protocols  Propofol on ventilator but if significant hypotension will switch to dexmedetomidine and fentanyl as tolerated  Patient not a good candidate for VPA with her cirrhosis but consider gabapentin as an adjunct  Rally vitamins  Monitor

## 2018-10-02 NOTE — FLOWSHEET NOTE
Adult Ventilation Update    Total Vent Days: 2    Patient Lines/Drains/Airways Status    Active Airway     Name: Placement date: Placement time: Site: Days:    Airway ETT 7.5 09/30/18   1750      less than 1                             Plateau Pressure (Q Shift): 21 (09/30/18 1843)  Static Compliance (ml / cm H2O): 20 (10/01/18 1440)    Patient failed trials because of    Barriers to SBT    Length of Weaning Trial                      Cough:  (not present cough reflex) (10/01/18 1600)  Sputum Amount: Small (10/01/18 1600)  Sputum Color: Tan;Bloody (bloody oral secretions) (10/01/18 1600)  Sputum Consistency: Thin (10/01/18 1600)    Mobility  Level of Mobility: Level I (10/01/18 0800)  Activity Performed: Unable to mobilize (09/30/18 2000)  Time Activity Tolerated: 10 min (09/29/18 1400)  Distance Per Occurrence (ft.): 15 feet (09/29/18 1400)  # of Times Distance was Traveled: 2 (09/29/18 1400)  Assistance / Tolerance: Assistance of One (09/29/18 1400)  Pt Calls for Assistance: No (09/30/18 0800)  Staff Present for Mobilization: CNA (09/29/18 1400)  Gait: Unsteady;Shuffle (09/29/18 1400)  Assistive Devices: Hand held assist (09/29/18 1400)  Reason Not Mobilized: Unstable condition (PEEP 12, FiO2 100%, vasopressors) (10/01/18 0800)  Mobilization Comments: Hypotensive; on pressors (09/30/18 2000)    Events/Summary/Plan: No other vent changes so far this shift.  Dr. Hardwick is here. (10/01/18 1440)

## 2018-10-02 NOTE — DISCHARGE PLANNING
Met with family, son, daughter, sister, and S/O at bedside. Likely to decide to transition to comfort care after more discussion with Dr. Landrum. (He was called away for urgent issue with another pt.) Provided Helpful Information at This Difficult Time booklet to family. S/O has made arrangements with [facility].   Will remain available to family for more questions or support if requested. They declined offer for .

## 2018-10-02 NOTE — CARE PLAN
Problem: Ventilation Defect:  Goal: Ability to achieve and maintain unassisted ventilation or tolerate decreased levels of ventilator support  Adult Ventilation Update    Total Vent Days: 3    Patient Lines/Drains/Airways Status    Active Airway     Name: Placement date: Placement time: Site: Days:    Airway ETT 7.5 09/30/18   1750      3                             Plateau Pressure (Q Shift): 29 (10/01/18 2209)  Static Compliance (ml / cm H2O): 19.2 (10/01/18 2209)          Barriers to SBT  Unstable hemodynamic condition at this time  Length of Weaning Trial                   Sputum/Suction   Cough:  (not present cough reflex) (10/01/18 2000)  Sputum Amount: Moderate (10/01/18 2000)  Sputum Color: Tan;Bloody;Yellow (bloody oral secretions) (10/01/18 2000)  Sputum Consistency: Thin (10/01/18 2000)    Mobility  Level of Mobility: Level I (10/01/18 2000)  Activity Performed: Unable to mobilize (10/01/18 2000)          Assistance / Tolerance: Tolerates Poorly (10/01/18 2000)  Pt Calls for Assistance: No (09/30/18 0800)  Staff Present for Mobilization: CNA (09/29/18 1400)  Gait: Unsteady;Shuffle (09/29/18 1400)  Assistive Devices: Hand held assist (09/29/18 1400)  Reason Not Mobilized: Unstable condition (10/01/18 2000)    Continue to support patients respiratory status.

## 2018-10-02 NOTE — PROGRESS NOTES
Nephrology Daily Progress Note    Date of Service  10/2/2018    Chief Complaint  53 y.o. female admitted 9/27/2018 with H/O ETOH liver cirrhosis, presented with decompensated liver disease, SHIN.    Interval Problem Update  Events las t 24h noted, still unresponsive, on IV pressors.  Plan for a family meeting this afternoon    Review of Systems  Review of Systems   Unable to perform ROS: Intubated        Physical Exam  Temp:  [35.3 °C (95.6 °F)-36.8 °C (98.2 °F)] 35.3 °C (95.6 °F)  Pulse:  [] 91  Resp:  [18-37] 18  BP: (125)/(71) 125/71    Physical Exam   Constitutional: She appears ill. She appears distressed. She is sedated and intubated.   HENT:   Right Ear: External ear normal.   Left Ear: External ear normal.   Nose: Nose normal.   ETT   Eyes: Conjunctivae are normal. Right eye exhibits no discharge. Left eye exhibits no discharge. No scleral icterus.   Neck: No JVD present. No tracheal deviation present. No thyromegaly present.   Cardiovascular: Normal rate, regular rhythm and normal heart sounds.    No murmur heard.  Pulmonary/Chest: She is intubated. She has no wheezes. She has no rales.   Coarse BS   Abdominal: Bowel sounds are normal. She exhibits no distension. There is no tenderness. There is no rebound.   Musculoskeletal: She exhibits edema. She exhibits no tenderness.   Lymphadenopathy:     She has no cervical adenopathy.   Neurological: She is unresponsive.   Skin: She is not diaphoretic.   Nursing note and vitals reviewed.      Fluids    Intake/Output Summary (Last 24 hours) at 10/02/18 1131  Last data filed at 10/02/18 1000   Gross per 24 hour   Intake          3760.64 ml   Output              250 ml   Net          3510.64 ml       Laboratory  Recent Labs      10/01/18   1642  10/01/18   2200  10/02/18   0400   WBC  17.6*  21.8*  23.4*   RBC  2.36*  2.57*  2.55*   HEMOGLOBIN  7.5*  8.3*  8.5*   HEMATOCRIT  22.0*  23.4*  23.4*   MCV  93.2  91.1  91.8   MCH  31.8  32.3  33.3*   MCHC  34.1  35.5*   36.3*   RDW  55.8*  56.2*  58.6*   PLATELETCT  183  184  162*   MPV  10.2  10.4  10.9     Recent Labs      10/01/18   0945  10/01/18   2200  10/02/18   0400   SODIUM  136  136  136   POTASSIUM  3.4*  2.9*  3.8   CHLORIDE  105  104  105   CO2  14*  17*  17*   GLUCOSE  258*  158*  165*   BUN  21  22  22   CREATININE  1.98*  2.10*  2.07*   CALCIUM  8.4*  8.9  9.1     Recent Labs      09/30/18   0840  10/01/18   0543   APTT  48.6*  59.9*   INR  1.80*  4.87*     Recent Labs      10/01/18   0543   BNPBTYPENAT  1864*     Recent Labs      09/30/18   1935  10/02/18   0400   TRIGLYCERIDE  48  203*       Imaging    Assessment/Plan  1 SHIN: etiology ATN Vs hepato renal syndrome, no signs of renal recovery  2 VDRF  3 Hypotension  4 Anemia:stable  5 intra abd bleed  6 metabolic acidosis  Plan  Pt is not a good candidate for dialysis considering her poor prognosis from liver disease and multi system organ failure  Renal dose all meds  Avoid nephrotoxins  Daily labs  Prognosis poor.  Family meeting this after noon  D/W Dr Landrum

## 2018-10-02 NOTE — PROGRESS NOTES
Patient noted to be visibly soiled and incontinent. Unable to change sheets at this time d/t hemodynamic and respiratory instability. Patient on pressors, desaturation noted with slight movements, on PEEP 12, 100% fiO2. Will increase sedation to assess patient's tolerance to turns and continue to reassess if patient will tolerate sheet change.     Unable to perform 2 RN skin check at this time

## 2018-10-02 NOTE — CARE PLAN
Problem: Venous Thromboembolism (VTW)/Deep Vein Thrombosis (DVT) Prevention:  Goal: Patient will participate in Venous Thrombosis (VTE)/Deep Vein Thrombosis (DVT)Prevention Measures  Outcome: PROGRESSING AS EXPECTED  SCDs on BLE    Problem: Pain Management  Goal: Pain level will decrease to patient's comfort goal  Outcome: PROGRESSING AS EXPECTED  CPOT Q hr. Goal 0

## 2018-10-02 NOTE — PROGRESS NOTES
2 RN skin check completed    Patient noted with redness/rash to face  Bruising noted in all extremities and RLQ trunk from paracentesis  Heels red, blanching, intact  Sacrum with IAD, red, intact, mepilex placed   Open blisters noted to posterior back

## 2018-10-02 NOTE — ASSESSMENT & PLAN NOTE
Transfuse to hemoglobin of 7  Serial CBC, no drop in hemoglobin over the last 24 hours  Correct coagulopathy and platelet dysfunction as clinically appropriate, status post 1 unit of platelets for abnormal platelet mapping  Initially suspecting variceal bleed and GI repeat contacted early a.m. 10/1  No significant bleeding via GI tract however, abdomen distended, thoracentesis performed 9/30  Query bleeding related to inadvertent rupture of abdominal varix?  Versus other intra-abdominal process  Arterial vascular exam unremarkable, doubt acute vascular abdominal catastrophe of arterial origin  Not a surgical candidate  Curb sided surgery, both agree patient is not a surgical candidate, and patient still is not a candidate to transport to CT scan at this time for imaging because she is too unstable from a hemodynamic and respiratory perspective!

## 2018-10-02 NOTE — ASSESSMENT & PLAN NOTE
Serial CBC  Transfuse per conservative transfusion policies  Monitor for platelet dysfunction and transfuse for that as well as absolute levels of platelet count

## 2018-10-02 NOTE — PROGRESS NOTES
Critical Care Progress Note    Date of admission  9/27/2018    Chief Complaint  53 y.o. Female with a past medical history of decompensated alcoholic liver cirrhosis with portal hypertension and ascites, osteoporosis, presented to the hospital complaining of low back pain, was transferred to ICU from the floor due to persistent hypotension, therefore ICU consult.     Hospital Course    9/30/2018: transferred to ICU      Interval Problem Update  Reviewed last 24 hour events:    Not following  NFE - sedate  Prop 60 -> 20  VENT day#3  PEEP 12, 100%, mechanics reviewed - PIP 31  CXR much worse pulm edema pattern  Secretions  ABG c/w mixed resp/met acidosis  SR/ST - QTc long  SBp 110s  SARTHAK off   NE  30 -> 15  VASO 0.03  Acetate off  Renal function no change - K better 3.8  UO poor  I/Os +6800  Hgb unchanged at 8.5, plt slt low at 162  Tm 98.2  Cortrak  Octreotide/PPI  Ceftriaxone  NH3 114  Discussed option of CT again and patient still on multiple pressors and on 100% FiO2 with low saturation and desaturates quickly with even the slightest movement in bed.    Serial follow-up times several, ventilator mechanics reviewed, remains on high PEEP with marginal oxygenation and FiO2 100%  Norepinephrine dose actually has been increased back to 30 due to hypotension  Urine output is decreased  Patient remains unresponsive  Abdominal exam unchanged    Reviewed case the bedside with nephrology  Prognosis very poor from his perspective and dialysis not recommended per his discussion with me    Family conference with Dr. Karo Palma, nursing staff, palliative care team and multiple family members including son and daughter significant other and later sister with her .  Diagnosis and prognosis of her multiple conditions outlined at length.  Family considering comfort care option at this time and will get back to us with questions or decisions.     YESTERDAY  Hgb 2 this AM!  4 blood  (to uncross matched), 2 FFP  Advance to 3  pressors  NE 30, Vaso 0.03, SARTHAK 50  NaAcetate drip  UO low - 75cc  ST - PVCs  SBp 110-130s  Paracentesis 5500 cc yesterday - transudate by fluid analysis  Not following, brainstem reflexes intact  NH3 89  Cortrak  Afebrile  CVC, PIVs  Fent/Propofol  Agitated yesterday -> sedate with Ativan -> intubated  Vent day#2  PEEP 8, fiO2 0.50,   CXR smaller volumes, patchy bilat opacities  Cultures neg  Ceftriaxone  HC  - Cortisol 7  No call back from family yet    Pepcid converted to PPI infusion    Significant other updated at bedside at length  Discussed with the GI early a.m. and again later morning  Concern of intra-abdominal bleeding not gastrointestinal hemorrhage, no bleeding noted from GI tract  Status post large volume paracentesis yesterday query bleeding related to that  Patient too unstable to go for CT scan abdomen to look for hematoma but abdominal exam consistent with this  Agitation or being placed marginally supine triggers hypoxemia  PEEP increased to 12 when FiO2 requirements went up to 100% and the patient desaturated to the 90s  Saturations did improve with increased PEEP  CVP elevated, pulmonary edema appearing fluid coming from ET tube  Urine output low Lasix trial started  Requested max concentration of all fluids    Son finally reached and he was updated by team, severity of condition prompted him to speak with his father, I believe he is  from our patient and the son is the decision-maker  Son got back to us and requests no escalation of therapy    RT could not obtain blood gas, no pulse and risks and difficult access of groin, I obtained a blood gas with right brachial artery stick myself using ultrasound, again family requested no escalation of therapy and arterial line was not placed    YESTERDAY   - SANCHEZ - hydrocortisone started   - hypotension worsened throughout the day, CVC placed and levo started   - encephalopathy worsened and patient was subsequently intubated    Review of  Systems  Review of Systems   Unable to perform ROS: Acuity of condition        Vital Signs for last 24 hours   Temp:  [36.2 °C (97.1 °F)-37.2 °C (99 °F)] 36.2 °C (97.1 °F)  Pulse:  [] 97  Resp:  [18-37] 18  BP: (114-131)/(53-71) 125/71    Hemodynamic parameters for last 24 hours  CVP:  [10 MM HG-276 MM HG] 10 MM HG    Vent Settings for last 24 hours  Tineo Vent Mode: APVCMV  Rate (breaths/min):  [18] 18  PEEP/CPAP:  [8-12] 12  FiO2:  [] 100  P Peak (PIP):  [24-34] 31  P MEAN:  [17-22] 18    Physical Exam   Physical Exam   Constitutional: She appears toxic. No distress. She is sedated and intubated.   HENT:   Head: Normocephalic and atraumatic.   Right Ear: External ear normal.   Left Ear: External ear normal.   Nose: Nose normal.   Eyes: Pupils are equal, round, and reactive to light. Conjunctivae are normal. Scleral icterus is present.   Neck: Neck supple. No JVD present. No tracheal deviation present.   Cardiovascular: Regular rhythm, intact distal pulses and normal pulses.   No extrasystoles are present. Tachycardia present.  Exam reveals no gallop and no friction rub.    No murmur heard.  Pulmonary/Chest: No accessory muscle usage. She is intubated. No respiratory distress. She has decreased breath sounds in the right lower field and the left lower field. She has no wheezes. She has rhonchi. She has rales in the right middle field, the right lower field, the left middle field and the left lower field.   Abdominal: Bowel sounds are normal. She exhibits distension (Similar significant distention as noted yesterday, probably). She exhibits no abdominal bruit and no pulsatile midline mass. There is tenderness. There is no rigidity, no rebound, no guarding, no CVA tenderness and negative Lr's sign.   Musculoskeletal: Normal range of motion. She exhibits no tenderness or deformity.   Neurological: She is unresponsive. She displays no tremor. No cranial nerve deficit. She exhibits normal muscle tone.  She displays no seizure activity. Coordination normal.   Skin: Skin is warm. No rash noted. She is not diaphoretic. No cyanosis. No pallor (Improved after transfusion). Nails show no clubbing.   Jaundice   Psychiatric: She is noncommunicative.   Nursing note and vitals reviewed.      Medications  Current Facility-Administered Medications   Medication Dose Route Frequency Provider Last Rate Last Dose   • MD Alert...ICU Electrolyte Replacement per Pharmacy   Other pharmacy to dose Josue Shane M.D.       • sodium bicarbonate tablet 1,300 mg  1,300 mg Oral TID Xiomy Ariza M.D.   1,300 mg at 10/02/18 0549   • pantoprazole (PROTONIX) 80 mg in  mL Infusion  8 mg/hr Intravenous Continuous Vince Landrum M.D. 25 mL/hr at 10/02/18 0544 8 mg/hr at 10/02/18 0544   • insulin regular (HUMULIN R) injection 3-14 Units  3-14 Units Subcutaneous Q6HRS Nataliya Severino M.D.   3 Units at 10/02/18 0552    And   • glucose 4 g chewable tablet 16 g  16 g Oral Q15 MIN PRGEOFF Severino M.D.        And   • dextrose 50% (D50W) injection 25 mL  25 mL Intravenous Q15 MIN PRGEOFF Severino M.D.       • lactulose 20 GM/30ML solution 30 mL  30 mL Oral TID Kit Sprague M.D.   30 mL at 10/02/18 0544   • midodrine (PROAMATINE) tablet 10 mg  10 mg Oral TID WITH MEALS Carlos Perry M.D.   10 mg at 10/02/18 0551   • fludrocortisone (FLORINEF) tablet 0.2 mg  0.2 mg Oral QAM Carlos Perry M.D.   0.2 mg at 10/02/18 0544   • thiamine tablet 100 mg  100 mg Oral DAILY Carlos Perry M.D.   100 mg at 10/02/18 0544   • folic acid (FOLVITE) tablet 1 mg  1 mg Oral DAILY Carlos Perry M.D.   1 mg at 10/02/18 0548   • hydrocortisone sodium succinate PF (SOLU-CORTEF) 100 MG injection 50 mg  50 mg Intravenous Q6HRS Clinton Espinal Jr., D.O.   50 mg at 10/02/18 0544   • octreotide (SANDOSTATIN) 1,250 mcg in  mL Infusion  50 mcg/hr Intravenous Continuous Bayron Santa D.O. 10 mL/hr at 10/01/18 1900 50 mcg/hr at 10/01/18 1900   •  multivitamin (THERAGRAN) tablet 1 Tab  1 Tab Oral DAILY Nataliya Severino M.D.   1 Tab at 10/02/18 0544    And   • folic acid (FOLVITE) tablet 1 mg  1 mg Oral DAILY Nataliya Severino M.D.   1 mg at 10/02/18 0544   • labetalol (NORMODYNE,TRANDATE) injection 10 mg  10 mg Intravenous Q HOUR PRN Nataliya Severino M.D.        Or   • labetalol (NORMODYNE) tablet 200 mg  200 mg Oral Q6HRS PRN Nataliya Severino M.D.       • gabapentin (NEURONTIN) capsule 300 mg  300 mg Oral TID Nataliya Severino M.D.   300 mg at 10/02/18 0544   • norepinephrine (LEVOPHED) 8 mg in  mL Infusion  0-30 mcg/min Intravenous Continuous Nataliya Severino M.D. 56.3 mL/hr at 10/02/18 0544 30 mcg/min at 10/02/18 0544   • fentaNYL (SUBLIMAZE) injection 25 mcg  25 mcg Intravenous Q HOUR PRN Clinton Espinal Jr., D.O.        Or   • fentaNYL (SUBLIMAZE) injection 50 mcg  50 mcg Intravenous Q HOUR PRN Clinton Espinal Jr. D.O.        Or   • fentaNYL (SUBLIMAZE) injection 100 mcg  100 mcg Intravenous Q HOUR PRN Clinton Espinal Jr., D.O.       • ipratropium-albuterol (DUONEB) nebulizer solution  3 mL Nebulization Q2HRS PRN (RT) Clinton Espinal Jr., D.O.       • Respiratory Care per Protocol   Nebulization Continuous RT Nataliya Severino M.D.       • propofol (DIPRIVAN) injection  0-80 mcg/kg/min Intravenous Continuous Nataliya Severino M.D. 24.4 mL/hr at 10/02/18 0544 60 mcg/kg/min at 10/02/18 0544   • vasopressin (VASOSTRICT) 20 Units in  mL Infusion  0.03 Units/min Intravenous Continuous Carlos Perry M.D. 9 mL/hr at 10/02/18 0400 0.03 Units/min at 10/02/18 0400   • phenylephrine (SARTHAK-SYNEPHRINE) 40,000 mcg in  mL Infusion  0-300 mcg/min Intravenous Continuous Cralos Perry M.D. 9.4 mL/hr at 10/01/18 1000 25 mcg/min at 10/01/18 1000   • fentaNYL (SUBLIMAZE) 50 mcg/mL in 50mL (Continuous Infusion)   Intravenous Continuous Carlos Perry M.D. 0.5 mL/hr at 10/02/18 0400 25 mcg/hr at 10/02/18 0400   • sodium acetate 150 mEq in D5W 1,000 mL infusion    Intravenous Continuous Carlos Perry M.D.   Stopped at 10/01/18 1500   • vitamin D (Ergocalciferol) (DRISDOL) capsule 50,000 Units  50,000 Units Oral Q7 DAYS Kevin Ibrahim M.D.   50,000 Units at 09/29/18 0840   • cefTRIAXone (ROCEPHIN) 1 g in  mL IVPB  1 g Intravenous Q24HRS Kevin Ibrahim M.D. 200 mL/hr at 10/02/18 0544 1 g at 10/02/18 0544   • albumin human 25% solution 50 g  50 g Intravenous Once PRN Kevin Ibrahim M.D.       • albumin human 25% solution 62.5 g  62.5 g Intravenous Once PRN Kevin Ibrahim M.D.       • levothyroxine (SYNTHROID) tablet 25 mcg  25 mcg Oral AM ES Rani Medrano M.D.   25 mcg at 10/02/18 0548   • FLUoxetine (PROZAC) capsule 20 mg  20 mg Oral DAILY Rani Medrano M.D.   20 mg at 10/02/18 0544   • ondansetron (ZOFRAN) syringe/vial injection 4 mg  4 mg Intravenous Q4HRS PRN Rani Medrano M.D.       • ondansetron (ZOFRAN ODT) dispertab 4 mg  4 mg Oral Q4HRS PRN Rani Medrano M.D.       • promethazine (PHENERGAN) tablet 12.5-25 mg  12.5-25 mg Oral Q4HRS PRN Rani Medrano M.D.       • promethazine (PHENERGAN) suppository 12.5-25 mg  12.5-25 mg Rectal Q4HRS PRN Rani Medrano M.D.       • prochlorperazine (COMPAZINE) injection 5-10 mg  5-10 mg Intravenous Q4HRS PRN Rani Medrano M.D.           Fluids    Intake/Output Summary (Last 24 hours) at 10/02/18 0602  Last data filed at 10/02/18 0600   Gross per 24 hour   Intake          6855.14 ml   Output               60 ml   Net          6795.14 ml       Laboratory  Recent Results (from the past 48 hour(s))   BASIC METABOLIC PANEL    Collection Time: 09/30/18  8:40 AM   Result Value Ref Range    Sodium 129 (L) 135 - 145 mmol/L    Potassium 3.3 (L) 3.6 - 5.5 mmol/L    Chloride 100 96 - 112 mmol/L    Co2 18 (L) 20 - 33 mmol/L    Glucose 135 (H) 65 - 99 mg/dL    Bun 22 8 - 22 mg/dL    Creatinine 2.14 (H) 0.50 - 1.40 mg/dL    Calcium 8.8 8.5 - 10.5 mg/dL    Anion Gap 11.0 0.0 - 11.9   BLOOD CULTURE     Collection Time: 09/30/18  8:40 AM   Result Value Ref Range    Significant Indicator NEG     Source BLD     Site PERIPHERAL     Blood Culture       No Growth    Note: Blood cultures are incubated for 5 days and  are monitored continuously.Positive blood cultures  are called to the RN and reported as soon as  they are identified.     BLOOD CULTURE    Collection Time: 09/30/18  8:40 AM   Result Value Ref Range    Significant Indicator NEG     Source BLD     Site PERIPHERAL     Blood Culture       No Growth    Note: Blood cultures are incubated for 5 days and  are monitored continuously.Positive blood cultures  are called to the RN and reported as soon as  they are identified.     PROCALCITONIN    Collection Time: 09/30/18  8:40 AM   Result Value Ref Range    Procalcitonin 0.27 (H) <0.25 ng/mL   VITAMIN B12    Collection Time: 09/30/18  8:40 AM   Result Value Ref Range    Vitamin B12 -True Cobalamin >1500 (H) 211 - 911 pg/mL   PROTHROMBIN TIME    Collection Time: 09/30/18  8:40 AM   Result Value Ref Range    PT 21.0 (H) 12.0 - 14.6 sec    INR 1.80 (H) 0.87 - 1.13   APTT    Collection Time: 09/30/18  8:40 AM   Result Value Ref Range    APTT 48.6 (H) 24.7 - 36.0 sec   TSH    Collection Time: 09/30/18  8:40 AM   Result Value Ref Range    TSH 2.120 0.380 - 5.330 uIU/mL   TRIIDOTHYRONINE    Collection Time: 09/30/18  8:40 AM   Result Value Ref Range    T3 38.6 (L) 60.0 - 181.0 ng/dL   FREE THYROXINE    Collection Time: 09/30/18  8:40 AM   Result Value Ref Range    Free T-4 1.08 0.53 - 1.43 ng/dL   ESTIMATED GFR    Collection Time: 09/30/18  8:40 AM   Result Value Ref Range    GFR If  29 (A) >60 mL/min/1.73 m 2    GFR If Non  24 (A) >60 mL/min/1.73 m 2   Fluid Cell Count w/Diff    Collection Time: 09/30/18  1:53 PM   Result Value Ref Range    Fluid Type Paracentesis     Color-Body Fluid Yellow     Character-Body Fluid Clear     Total RBC Count <2000 cells/uL    Total  cells/uL    Polys  54 %    Lymphs 21 %    Mononuclear Cells - Fluid 19 %    Mesothelial Cells - CSF 2 %    Fluid Histiocyte 1 %    Eosinophils - CSF 2 %    Fluid Basophil 1 %   Fluid Total Protein    Collection Time: 09/30/18  1:53 PM   Result Value Ref Range    Fluid Type Paracentesis     Total Protein Fluid 1.3 g/dL   FLUID CULTURE W/GRAM STAIN    Collection Time: 09/30/18  1:53 PM   Result Value Ref Range    Significant Indicator NEG     Source BF     Site Ascites Fluid     Culture Result Bdf No growth at 24 hours.     Gram Stain Result Rare WBCs.  No organisms seen.      FLUID ALBUMIN    Collection Time: 09/30/18  1:53 PM   Result Value Ref Range    Albumin <1.0 g/dL   GRAM STAIN    Collection Time: 09/30/18  1:53 PM   Result Value Ref Range    Significant Indicator .     Source BF     Site Ascites Fluid     Gram Stain Result Rare WBCs.  No organisms seen.      Phosphorus: STAT    Collection Time: 09/30/18  3:45 PM   Result Value Ref Range    Phosphorus 5.5 (H) 2.5 - 4.5 mg/dL   Ammonia: STAT    Collection Time: 09/30/18  3:45 PM   Result Value Ref Range    Ammonia 46 (H) 11 - 45 umol/L   BASIC METABOLIC PANEL    Collection Time: 09/30/18  3:45 PM   Result Value Ref Range    Sodium 131 (L) 135 - 145 mmol/L    Potassium 3.6 3.6 - 5.5 mmol/L    Chloride 102 96 - 112 mmol/L    Co2 16 (L) 20 - 33 mmol/L    Glucose 164 (H) 65 - 99 mg/dL    Bun 22 8 - 22 mg/dL    Creatinine 2.11 (H) 0.50 - 1.40 mg/dL    Calcium 8.5 8.5 - 10.5 mg/dL    Anion Gap 13.0 (H) 0.0 - 11.9   ESTIMATED GFR    Collection Time: 09/30/18  3:45 PM   Result Value Ref Range    GFR If African American 30 (A) >60 mL/min/1.73 m 2    GFR If Non  24 (A) >60 mL/min/1.73 m 2   ISTAT ARTERIAL BLOOD GAS    Collection Time: 09/30/18  6:31 PM   Result Value Ref Range    Ph 7.312 (L) 7.400 - 7.500    Pco2 29.6 26.0 - 37.0 mmHg    Po2 107 (H) 64 - 87 mmHg    Tco2 16 (L) 20 - 33 mmol/L    S02 98 93 - 99 %    Hco3 15.0 (L) 17.0 - 25.0 mmol/L    BE -10 (L) -4 - 3  mmol/L    Body Temp 97.0 F degrees    O2 Therapy 80 %    iPF Ratio 134     Ph Temp Eh 7.324 (L) 7.400 - 7.500    Pco2 Temp Co 28.5 26.0 - 37.0 mmHg    Po2 Temp Cor 102 (H) 64 - 87 mmHg    Specimen Arterial     Action Range Triggered NO     Inst. Qualified Patient YES    ACCU-CHEK GLUCOSE    Collection Time: 09/30/18  6:40 PM   Result Value Ref Range    Glucose - Accu-Ck 203 (H) 65 - 99 mg/dL   Triglycerides Starting now and then Every 3 Days    Collection Time: 09/30/18  7:35 PM   Result Value Ref Range    Triglycerides 48 0 - 149 mg/dL   BASIC METABOLIC PANEL    Collection Time: 09/30/18  7:35 PM   Result Value Ref Range    Sodium 130 (L) 135 - 145 mmol/L    Potassium 3.5 (L) 3.6 - 5.5 mmol/L    Chloride 105 96 - 112 mmol/L    Co2 14 (L) 20 - 33 mmol/L    Glucose 269 (H) 65 - 99 mg/dL    Bun 22 8 - 22 mg/dL    Creatinine 2.04 (H) 0.50 - 1.40 mg/dL    Calcium 7.8 (L) 8.5 - 10.5 mg/dL    Anion Gap 11.0 0.0 - 11.9   ESTIMATED GFR    Collection Time: 09/30/18  7:35 PM   Result Value Ref Range    GFR If  31 (A) >60 mL/min/1.73 m 2    GFR If Non African American 25 (A) >60 mL/min/1.73 m 2   BASIC METABOLIC PANEL    Collection Time: 10/01/18 12:00 AM   Result Value Ref Range    Sodium 131 (L) 135 - 145 mmol/L    Potassium 3.3 (L) 3.6 - 5.5 mmol/L    Chloride 104 96 - 112 mmol/L    Co2 13 (L) 20 - 33 mmol/L    Glucose 321 (H) 65 - 99 mg/dL    Bun 22 8 - 22 mg/dL    Creatinine 2.06 (H) 0.50 - 1.40 mg/dL    Calcium 8.2 (L) 8.5 - 10.5 mg/dL    Anion Gap 14.0 (H) 0.0 - 11.9   ESTIMATED GFR    Collection Time: 10/01/18 12:00 AM   Result Value Ref Range    GFR If African American 30 (A) >60 mL/min/1.73 m 2    GFR If Non African American 25 (A) >60 mL/min/1.73 m 2   ACCU-CHEK GLUCOSE    Collection Time: 10/01/18 12:02 AM   Result Value Ref Range    Glucose - Accu-Ck 294 (H) 65 - 99 mg/dL   Magnesium: AM Daily x 3 days    Collection Time: 10/01/18  4:00 AM   Result Value Ref Range    Magnesium 1.6 1.5 - 2.5 mg/dL    Phosphorus: AM Daily x 3 days    Collection Time: 10/01/18  4:00 AM   Result Value Ref Range    Phosphorus 5.8 (H) 2.5 - 4.5 mg/dL   Ammonia: AM Daily x 3 days    Collection Time: 10/01/18  4:00 AM   Result Value Ref Range    Ammonia 89 (H) 11 - 45 umol/L   Comp Metabolic Panel (CMP) - Every Monday    Collection Time: 10/01/18  4:00 AM   Result Value Ref Range    Sodium 133 (L) 135 - 145 mmol/L    Potassium 3.5 (L) 3.6 - 5.5 mmol/L    Chloride 103 96 - 112 mmol/L    Co2 12 (L) 20 - 33 mmol/L    Anion Gap 18.0 (H) 0.0 - 11.9    Glucose 286 (H) 65 - 99 mg/dL    Bun 22 8 - 22 mg/dL    Creatinine 2.10 (H) 0.50 - 1.40 mg/dL    Calcium 8.6 8.5 - 10.5 mg/dL    AST(SGOT) 106 (H) 12 - 45 U/L    ALT(SGPT) 26 2 - 50 U/L    Alkaline Phosphatase 46 30 - 99 U/L    Total Bilirubin 3.0 (H) 0.1 - 1.5 mg/dL    Albumin 3.9 3.2 - 4.9 g/dL    Total Protein 5.2 (L) 6.0 - 8.2 g/dL    Globulin 1.3 (L) 1.9 - 3.5 g/dL    A-G Ratio 3.0 g/dL   ESTIMATED GFR    Collection Time: 10/01/18  4:00 AM   Result Value Ref Range    GFR If African American 30 (A) >60 mL/min/1.73 m 2    GFR If Non African American 25 (A) >60 mL/min/1.73 m 2   ISTAT ARTERIAL BLOOD GAS    Collection Time: 10/01/18  4:20 AM   Result Value Ref Range    Ph 7.309 (L) 7.400 - 7.500    Pco2 21.4 (L) 26.0 - 37.0 mmHg    Po2 97 (H) 64 - 87 mmHg    Tco2 11 (L) 20 - 33 mmol/L    S02 97 93 - 99 %    Hco3 10.8 (L) 17.0 - 25.0 mmol/L    BE -15 (L) -4 - 3 mmol/L    Body Temp 98.0 F degrees    O2 Therapy 50 %    iPF Ratio 194     Ph Temp Eh 7.313 (L) 7.400 - 7.500    Pco2 Temp Co 21.1 (L) 26.0 - 37.0 mmHg    Po2 Temp Cor 95 (H) 64 - 87 mmHg    Specimen Arterial     Action Range Triggered NO     Inst. Qualified Patient YES    BASIC METABOLIC PANEL    Collection Time: 10/01/18  4:55 AM   Result Value Ref Range    Sodium 133 (L) 135 - 145 mmol/L    Potassium 3.4 (L) 3.6 - 5.5 mmol/L    Chloride 103 96 - 112 mmol/L    Co2 11 (L) 20 - 33 mmol/L    Glucose 263 (H) 65 - 99 mg/dL    Bun 22 8  - 22 mg/dL    Creatinine 2.07 (H) 0.50 - 1.40 mg/dL    Calcium 8.4 (L) 8.5 - 10.5 mg/dL    Anion Gap 19.0 (H) 0.0 - 11.9   ESTIMATED GFR    Collection Time: 10/01/18  4:55 AM   Result Value Ref Range    GFR If African American 30 (A) >60 mL/min/1.73 m 2    GFR If Non African American 25 (A) >60 mL/min/1.73 m 2   ACCU-CHEK GLUCOSE    Collection Time: 10/01/18  5:03 AM   Result Value Ref Range    Glucose - Accu-Ck 260 (H) 65 - 99 mg/dL   APTT    Collection Time: 10/01/18  5:43 AM   Result Value Ref Range    APTT 59.9 (H) 24.7 - 36.0 sec   PROTHROMBIN TIME    Collection Time: 10/01/18  5:43 AM   Result Value Ref Range    PT 45.3 (H) 12.0 - 14.6 sec    INR 4.87 (H) 0.87 - 1.13   CBC WITH DIFFERENTIAL    Collection Time: 10/01/18  5:43 AM   Result Value Ref Range    WBC 12.7 (H) 4.8 - 10.8 K/uL    RBC 0.62 (L) 4.20 - 5.40 M/uL    Hemoglobin 2.2 (LL) 12.0 - 16.0 g/dL    Hematocrit 6.7 (LL) 37.0 - 47.0 %    .1 (H) 81.4 - 97.8 fL    MCH 35.5 (H) 27.0 - 33.0 pg    MCHC 32.8 (L) 33.6 - 35.0 g/dL    RDW 57.2 (H) 35.9 - 50.0 fL    Platelet Count 111 (L) 164 - 446 K/uL    MPV 11.2 9.0 - 12.9 fL    Neutrophils-Polys 72.70 (H) 44.00 - 72.00 %    Lymphocytes 15.40 (L) 22.00 - 41.00 %    Monocytes 10.80 0.00 - 13.40 %    Eosinophils 0.00 0.00 - 6.90 %    Basophils 0.10 0.00 - 1.80 %    Immature Granulocytes 1.00 (H) 0.00 - 0.90 %    Nucleated RBC 0.00 /100 WBC    Neutrophils (Absolute) 9.19 (H) 2.00 - 7.15 K/uL    Lymphs (Absolute) 1.95 1.00 - 4.80 K/uL    Monos (Absolute) 1.37 (H) 0.00 - 0.85 K/uL    Eos (Absolute) 0.00 0.00 - 0.51 K/uL    Baso (Absolute) 0.01 0.00 - 0.12 K/uL    Immature Granulocytes (abs) 0.13 (H) 0.00 - 0.11 K/uL    NRBC (Absolute) 0.00 K/uL   BTYPE NATRIURETIC PEPTIDE    Collection Time: 10/01/18  5:43 AM   Result Value Ref Range    B Natriuretic Peptide 1864 (H) 0 - 100 pg/mL   COD (ADULT)    Collection Time: 10/01/18  5:43 AM   Result Value Ref Range    ABO Grouping Only O     Rh Grouping Only NEG      Antibody Screen-Cod NEG     Component R       R                   Red Blood Cells     [id number]   transfused   10/01/18   07:27      Product Type Red Blood Cells LR     Dispense Status Transfused     Unit Number (Barcoded) [id number]     Product Code (Barcoded) N9356Z06     Blood Type (Barcoded) 9500     Component R       R                   Red Blood Cells     [id number]   transfused   10/01/18   07:27      Product Type Red Blood Cells LR     Dispense Status Transfused     Unit Number (Barcoded) [id number]     Product Code (Barcoded) A0217G88     Blood Type (Barcoded) 9500    EKG: Every morning x 3 days    Collection Time: 10/01/18  5:50 AM   Result Value Ref Range    Report       [facility]    Test Date:  2018-10-01  Pt Name:    CHARLEEN RIDER            Department: [facility]  MRN:        [id number]                      Room:       [facility]  Gender:     Female                       Technician: GENESIS  :        1965                   Requested By:MICHAEL MOORE  Order #:    [id number]                    Reading MD: Khurram Palma MD    Measurements  Intervals                                Axis  Rate:       104                          P:          68  SC:         138                          QRS:        39  QRSD:       99                           T:          8  QT:         363  QTc:        478    Interpretive Statements  SINUS TACHYCARDIA  LOW VOLTAGE, PRECORDIAL LEADS  BORDERLINE REPOLARIZATION ABNORMALITY  Compared to ECG 2018 21:29:10  Low QRS voltage now present    Electronically Signed On 10-1-2018 15:54:27 PDT by Khurram Palma MD     UN-XM'D RBC    Collection Time: 10/01/18  6:24 AM   Result Value Ref Range    Component R       RC                  Red Blood Cells     [id number]   transfused   10/01/18   06:27      Product Type Red Blood Cells LR     Dispense Status Transfused     Unit Number (Barcoded) [id number]     Product Code (Barcoded) Y7633I20     Blood Type  (Barcoded) 9500     Component R       R7                  Red Blood Cells7    [id number]   transfused   10/01/18   06:27      Product Type Red Blood Cells LR Pheresis     Dispense Status Transfused     Unit Number (Barcoded) [id number]     Product Code (Barcoded) X4449M34     Blood Type (Barcoded) 9500    FRESH FROZEN PLASMA    Collection Time: 10/01/18  6:27 AM   Result Value Ref Range    Component Ft       FPT                 Plasma, Thawed      [id number]   transfused   10/01/18   06:39      Product Type Plasma  Thawed     Dispense Status Transfused     Unit Number (Barcoded) [id number]     Product Code (Barcoded) Z4820L81     Blood Type (Barcoded) 7300     Component Ft       FPT                 Plasma, Thawed      [id number]   transfused   10/01/18   06:39      Product Type Plasma  Thawed     Dispense Status Transfused     Unit Number (Barcoded) [id number]     Product Code (Barcoded) V1105Q06     Blood Type (Barcoded) 5100    PLATELET MAPPING WITH BASIC TEG    Collection Time: 10/01/18  6:30 AM   Result Value Ref Range    Reaction Time Initial-R 8.5 5.0 - 10.0 min    Clot Kinetics-K 4.6 (H) 1.0 - 3.0 min    Clot Angle-Angle 46.2 (L) 53.0 - 72.0 degrees    Maximum Clot Strength-MA 41.3 (L) 50.0 - 70.0 mm    Lysis 30 minutes-LY30 0.0 0.0 - 8.0 %    % Inhibition .0 %    % Inhibition .0 %    TEG Algorithm Link Algorithm    BASIC METABOLIC PANEL    Collection Time: 10/01/18  9:45 AM   Result Value Ref Range    Sodium 136 135 - 145 mmol/L    Potassium 3.4 (L) 3.6 - 5.5 mmol/L    Chloride 105 96 - 112 mmol/L    Co2 14 (L) 20 - 33 mmol/L    Glucose 258 (H) 65 - 99 mg/dL    Bun 21 8 - 22 mg/dL    Creatinine 1.98 (H) 0.50 - 1.40 mg/dL    Calcium 8.4 (L) 8.5 - 10.5 mg/dL    Anion Gap 17.0 (H) 0.0 - 11.9   CBC WITH DIFFERENTIAL    Collection Time: 10/01/18  9:45 AM   Result Value Ref Range    WBC 15.6 (H) 4.8 - 10.8 K/uL    RBC 2.35 (L) 4.20 - 5.40 M/uL    Hemoglobin 7.4 (L) 12.0 - 16.0 g/dL     Hematocrit 22.9 (L) 37.0 - 47.0 %    MCV 97.4 81.4 - 97.8 fL    MCH 31.5 27.0 - 33.0 pg    MCHC 32.3 (L) 33.6 - 35.0 g/dL    RDW 54.5 (H) 35.9 - 50.0 fL    Platelet Count 89 (L) 164 - 446 K/uL    MPV 10.9 9.0 - 12.9 fL    Neutrophils-Polys 74.30 (H) 44.00 - 72.00 %    Lymphocytes 12.20 (L) 22.00 - 41.00 %    Monocytes 11.40 0.00 - 13.40 %    Eosinophils 0.00 0.00 - 6.90 %    Basophils 0.30 0.00 - 1.80 %    Immature Granulocytes 1.80 (H) 0.00 - 0.90 %    Nucleated RBC 0.20 /100 WBC    Neutrophils (Absolute) 11.62 (H) 2.00 - 7.15 K/uL    Lymphs (Absolute) 1.91 1.00 - 4.80 K/uL    Monos (Absolute) 1.78 (H) 0.00 - 0.85 K/uL    Eos (Absolute) 0.00 0.00 - 0.51 K/uL    Baso (Absolute) 0.05 0.00 - 0.12 K/uL    Immature Granulocytes (abs) 0.28 (H) 0.00 - 0.11 K/uL    NRBC (Absolute) 0.03 K/uL   ESTIMATED GFR    Collection Time: 10/01/18  9:45 AM   Result Value Ref Range    GFR If  32 (A) >60 mL/min/1.73 m 2    GFR If Non African American 26 (A) >60 mL/min/1.73 m 2   PLATELET MAPPING WITH BASIC TEG    Collection Time: 10/01/18 10:30 AM   Result Value Ref Range    Reaction Time Initial-R 8.9 5.0 - 10.0 min    Clot Kinetics-K 5.8 (H) 1.0 - 3.0 min    Clot Angle-Angle 38.5 (L) 53.0 - 72.0 degrees    Maximum Clot Strength-MA 38.6 (L) 50.0 - 70.0 mm    Lysis 30 minutes-LY30 0.0 0.0 - 8.0 %    % Inhibition ADP see comment %    % Inhibition AA see comment %    TEG Algorithm Link Algorithm    ACCU-CHEK GLUCOSE    Collection Time: 10/01/18 12:06 PM   Result Value Ref Range    Glucose - Accu-Ck 232 (H) 65 - 99 mg/dL   PLATELETS REQUEST    Collection Time: 10/01/18  1:40 PM   Result Value Ref Range    Component P       P                   Plts,Pheresis       [id number]   transfused   10/01/18   13:44      Product Type Platelets  Pheresis LR     Dispense Status Transfused     Unit Number (Barcoded) [id number]     Product Code (Barcoded) J8396V78     Blood Type (Barcoded) 9500    ISTAT ARTERIAL BLOOD GAS     Collection Time: 10/01/18  3:36 PM   Result Value Ref Range    Ph 7.356 (L) 7.400 - 7.500    Pco2 29.0 26.0 - 37.0 mmHg    Po2 61 (L) 64 - 87 mmHg    Tco2 17 (L) 20 - 33 mmol/L    S02 90 (L) 93 - 99 %    Hco3 16.2 (L) 17.0 - 25.0 mmol/L    BE -8 (L) -4 - 3 mmol/L    Body Temp 97.3 F degrees    O2 Therapy 100 %    iPF Ratio 61     Ph Temp Eh 7.366 (L) 7.400 - 7.500    Pco2 Temp Co 28.1 26.0 - 37.0 mmHg    Po2 Temp Cor 58 (L) 64 - 87 mmHg    Specimen Arterial     Action Range Triggered NO     Inst. Qualified Patient YES    CBC WITH DIFFERENTIAL    Collection Time: 10/01/18  3:50 PM   Result Value Ref Range    WBC 16.8 (H) 4.8 - 10.8 K/uL    RBC 2.35 (L) 4.20 - 5.40 M/uL    Hemoglobin 7.6 (L) 12.0 - 16.0 g/dL    Hematocrit 21.5 (L) 37.0 - 47.0 %    MCV 91.5 81.4 - 97.8 fL    MCH 32.3 27.0 - 33.0 pg    MCHC 35.3 (H) 33.6 - 35.0 g/dL    RDW 54.7 (H) 35.9 - 50.0 fL    Platelet Count 188 164 - 446 K/uL    MPV 10.3 9.0 - 12.9 fL    Neutrophils-Polys 77.30 (H) 44.00 - 72.00 %    Lymphocytes 9.70 (L) 22.00 - 41.00 %    Monocytes 11.80 0.00 - 13.40 %    Eosinophils 0.00 0.00 - 6.90 %    Basophils 0.10 0.00 - 1.80 %    Immature Granulocytes 1.10 (H) 0.00 - 0.90 %    Nucleated RBC 0.10 /100 WBC    Neutrophils (Absolute) 13.02 (H) 2.00 - 7.15 K/uL    Lymphs (Absolute) 1.63 1.00 - 4.80 K/uL    Monos (Absolute) 1.98 (H) 0.00 - 0.85 K/uL    Eos (Absolute) 0.00 0.00 - 0.51 K/uL    Baso (Absolute) 0.02 0.00 - 0.12 K/uL    Immature Granulocytes (abs) 0.19 (H) 0.00 - 0.11 K/uL    NRBC (Absolute) 0.02 K/uL   CBC WITHOUT DIFFERENTIAL    Collection Time: 10/01/18  4:42 PM   Result Value Ref Range    WBC 17.6 (H) 4.8 - 10.8 K/uL    RBC 2.36 (L) 4.20 - 5.40 M/uL    Hemoglobin 7.5 (L) 12.0 - 16.0 g/dL    Hematocrit 22.0 (L) 37.0 - 47.0 %    MCV 93.2 81.4 - 97.8 fL    MCH 31.8 27.0 - 33.0 pg    MCHC 34.1 33.6 - 35.0 g/dL    RDW 55.8 (H) 35.9 - 50.0 fL    Platelet Count 183 164 - 446 K/uL    MPV 10.2 9.0 - 12.9 fL   ACCU-CHEK GLUCOSE     Collection Time: 10/01/18  5:43 PM   Result Value Ref Range    Glucose - Accu-Ck 178 (H) 65 - 99 mg/dL   BASIC METABOLIC PANEL    Collection Time: 10/01/18 10:00 PM   Result Value Ref Range    Sodium 136 135 - 145 mmol/L    Potassium 2.9 (L) 3.6 - 5.5 mmol/L    Chloride 104 96 - 112 mmol/L    Co2 17 (L) 20 - 33 mmol/L    Glucose 158 (H) 65 - 99 mg/dL    Bun 22 8 - 22 mg/dL    Creatinine 2.10 (H) 0.50 - 1.40 mg/dL    Calcium 8.9 8.5 - 10.5 mg/dL    Anion Gap 15.0 (H) 0.0 - 11.9   CBC WITHOUT DIFFERENTIAL    Collection Time: 10/01/18 10:00 PM   Result Value Ref Range    WBC 21.8 (H) 4.8 - 10.8 K/uL    RBC 2.57 (L) 4.20 - 5.40 M/uL    Hemoglobin 8.3 (L) 12.0 - 16.0 g/dL    Hematocrit 23.4 (L) 37.0 - 47.0 %    MCV 91.1 81.4 - 97.8 fL    MCH 32.3 27.0 - 33.0 pg    MCHC 35.5 (H) 33.6 - 35.0 g/dL    RDW 56.2 (H) 35.9 - 50.0 fL    Platelet Count 184 164 - 446 K/uL    MPV 10.4 9.0 - 12.9 fL   ESTIMATED GFR    Collection Time: 10/01/18 10:00 PM   Result Value Ref Range    GFR If African American 30 (A) >60 mL/min/1.73 m 2    GFR If Non African American 25 (A) >60 mL/min/1.73 m 2   ACCU-CHEK GLUCOSE    Collection Time: 10/02/18 12:38 AM   Result Value Ref Range    Glucose - Accu-Ck 135 (H) 65 - 99 mg/dL   Magnesium: AM Daily x 3 days    Collection Time: 10/02/18  4:00 AM   Result Value Ref Range    Magnesium 2.2 1.5 - 2.5 mg/dL   Phosphorus: AM Daily x 3 days    Collection Time: 10/02/18  4:00 AM   Result Value Ref Range    Phosphorus 6.2 (H) 2.5 - 4.5 mg/dL   Ammonia: AM Daily x 3 days    Collection Time: 10/02/18  4:00 AM   Result Value Ref Range    Ammonia 114 (HH) 11 - 45 umol/L   Triglycerides Starting now and then Every 3 Days    Collection Time: 10/02/18  4:00 AM   Result Value Ref Range    Triglycerides 203 (H) 0 - 149 mg/dL   CBC with Differential    Collection Time: 10/02/18  4:00 AM   Result Value Ref Range    WBC 23.4 (H) 4.8 - 10.8 K/uL    RBC 2.55 (L) 4.20 - 5.40 M/uL    Hemoglobin 8.5 (L) 12.0 - 16.0 g/dL     Hematocrit 23.4 (L) 37.0 - 47.0 %    MCV 91.8 81.4 - 97.8 fL    MCH 33.3 (H) 27.0 - 33.0 pg    MCHC 36.3 (H) 33.6 - 35.0 g/dL    RDW 58.6 (H) 35.9 - 50.0 fL    Platelet Count 162 (L) 164 - 446 K/uL    MPV 10.9 9.0 - 12.9 fL    Neutrophils-Polys 81.60 (H) 44.00 - 72.00 %    Lymphocytes 7.20 (L) 22.00 - 41.00 %    Monocytes 9.50 0.00 - 13.40 %    Eosinophils 0.00 0.00 - 6.90 %    Basophils 0.20 0.00 - 1.80 %    Immature Granulocytes 1.50 (H) 0.00 - 0.90 %    Nucleated RBC 0.20 /100 WBC    Neutrophils (Absolute) 19.07 (H) 2.00 - 7.15 K/uL    Lymphs (Absolute) 1.69 1.00 - 4.80 K/uL    Monos (Absolute) 2.23 (H) 0.00 - 0.85 K/uL    Eos (Absolute) 0.00 0.00 - 0.51 K/uL    Baso (Absolute) 0.04 0.00 - 0.12 K/uL    Immature Granulocytes (abs) 0.34 (H) 0.00 - 0.11 K/uL    NRBC (Absolute) 0.04 K/uL   BASIC METABOLIC PANEL    Collection Time: 10/02/18  4:00 AM   Result Value Ref Range    Sodium 136 135 - 145 mmol/L    Potassium 3.8 3.6 - 5.5 mmol/L    Chloride 105 96 - 112 mmol/L    Co2 17 (L) 20 - 33 mmol/L    Glucose 165 (H) 65 - 99 mg/dL    Bun 22 8 - 22 mg/dL    Creatinine 2.07 (H) 0.50 - 1.40 mg/dL    Calcium 9.1 8.5 - 10.5 mg/dL    Anion Gap 14.0 (H) 0.0 - 11.9   ESTIMATED GFR    Collection Time: 10/02/18  4:00 AM   Result Value Ref Range    GFR If African American 30 (A) >60 mL/min/1.73 m 2    GFR If Non African American 25 (A) >60 mL/min/1.73 m 2   ISTAT ARTERIAL BLOOD GAS    Collection Time: 10/02/18  4:24 AM   Result Value Ref Range    Ph 7.205 (LL) 7.400 - 7.500    Pco2 42.0 (H) 26.0 - 37.0 mmHg    Po2 54 (L) 64 - 87 mmHg    Tco2 18 (L) 20 - 33 mmol/L    S02 81 (L) 93 - 99 %    Hco3 16.6 (L) 17.0 - 25.0 mmol/L    BE -11 (L) -4 - 3 mmol/L    Body Temp 97.2 F degrees    O2 Therapy 100 %    iPF Ratio 54     Ph Temp Eh 7.215 (LL) 7.400 - 7.500    Pco2 Temp Co 40.6 (H) 26.0 - 37.0 mmHg    Po2 Temp Cor 52 (L) 64 - 87 mmHg    Specimen Arterial     Action Range Triggered YES     Inst. Qualified Patient YES         Imaging  X-Ray:  I have personally reviewed the images and compared with prior images.    Assessment/Plan  * Acute respiratory failure (HCC)   Assessment & Plan    Intubated 9/30 for airway protection, now on ventilator for probable ARDS  Developing pulmonary edema!  Possibly both volume overload cardiogenic as well as ARDS!  Serial ABG/chest x-ray and ventilator mechanics review, having more issues ventilating, PCO2 increasing  RT/O2 protocols  Titration of ventilator therapy based on ABGs and patient's status  Adjust I time and PEEP to help with oxygenation  Patient may need Flolan by inhalation to help with VQ matching and oxygenation  She is not a pronating candidate given her current abdominal circumstances  Consider APRV rescue mode for oxygenation  Prognosis very poor  Sedation as tolerated/indicated  HOB >30 degrees and peridex for VAP prevention  Pepcid for GI prophylaxis -> PPI drip  SAT/SBT when able (ABCDEF Bundle)  Early mobility when clinically appropriate        Acute blood loss anemia   Assessment & Plan    Transfuse to hemoglobin of 7  Serial CBC, no drop in hemoglobin over the last 24 hours  Correct coagulopathy and platelet dysfunction as clinically appropriate, status post 1 unit of platelets for abnormal platelet mapping  Initially suspecting variceal bleed and GI repeat contacted early a.m. 10/1  No significant bleeding via GI tract however, abdomen distended, thoracentesis performed 9/30  Query bleeding related to inadvertent rupture of abdominal varix?  Versus other intra-abdominal process  Arterial vascular exam unremarkable, doubt acute vascular abdominal catastrophe of arterial origin  Not a surgical candidate  Curb sided surgery, both agree patient is not a surgical candidate, and patient still is not a candidate to transport to CT scan at this time for imaging because she is too unstable from a hemodynamic and respiratory perspective!        Hypotension   Assessment & Plan    2/2  decompensated intra-abdominal bleeding and cirrhosis, less likely sepsis as well  Patient too unstable to move in bed let alone go for CT abdomen  Curb sided surgery not a candidate for intervention yesterday or today  Good pulses in lower extremities doubt vascular catastrophe of arterial origin  Resuscitating with blood products  Continue titrating vasopressin and norepinephrine  Albumin infusions as needed  Conservative fluid management otherwise secondary to hypoxemia and respiratory failure          Decompensated ascites secondary to alcoholic cirrhosis and alcoholic hepatitis (HCC)- (present on admission)   Assessment & Plan    MELD 31, MDF 44  With likely HRS, prognosis extremely poor  Albumin, octreotide, midodrine, PPI  Hold steroids currently  SBP prophylaxis with ceftriaxone times 5-7 days  GI consult ongoing        Acute adrenal insufficiency (HCC)   Assessment & Plan    Hydrocortisone 50 mg q6 hours, continue for now        Alcohol withdrawal syndrome with perceptual disturbance (HCC)   Assessment & Plan    D/c CIWA protocols  Propofol on ventilator but if significant hypotension will switch to dexmedetomidine and fentanyl as tolerated  Patient not a good candidate for VPA with her cirrhosis but consider gabapentin as an adjunct  Rally vitamins  Monitor        Coagulopathy (HCC)   Assessment & Plan    2/2 cirrhosis  Monitor for bleeding, reverse if needed  Significant platelet dysfunction identified by platelet mapping, transfuse platelets as needed        SHIN (acute kidney injury) (HCC)   Assessment & Plan    ?HRS vs ATN  IVF  renal dose meds, avoid nephrotoxins  Strict I/Os  Follow renal function  urine studies  Albumin  Not a good candidate for RRT  Discussed with nephrology today  Multiple possible etiologies of acute renal failure including HRS, ATN from hypoxemia/hypertension, sepsis etc.  Prognosis poor, dialysis not recommended by nephrology        Alcoholic hepatitis with ascites- (present on  admission)   Assessment & Plan    Serial paracentesis, status post large volume paracentesis 9/30 on medical telemetry floor  Lasix/Spironalactone on hold  GI consult noted        Compression fracture of vertebra (HCC)   Assessment & Plan    Pain control, kyphoplasty on hold        Hypothyroidism- (present on admission)   Assessment & Plan    synthroid        Hypokalemia   Assessment & Plan    Replete to maintain >4, holding electrolyte replacement protocol due to decreased urine output  Monitor and replete magnesium, calcium and phosphorus as well as clinically appropriate        Thrombocytopenia (HCC)- (present on admission)   Assessment & Plan    Serial CBC  Transfuse per conservative transfusion policies  Monitor for platelet dysfunction and transfuse for that as well as absolute levels of platelet count        Hyponatremia- (present on admission)   Assessment & Plan    2/2 cirrhosis and HRS  Avoid hypotonic fluids  Monitor daily             VTE:  Contraindicated  Ulcer: PPI  Lines: Central Line  Ongoing indication addressed    I have performed a physical exam and reviewed and updated ROS and Plan today (10/2/2018). In review of yesterday's note (10/1/2018), there are no changes except as documented above.     Discussed patient condition and risk of morbidity and/or mortality with Hospitalist, Family, RN, RT, Pharmacy, Dietary, , Code status disscussed, Charge nurse / hot rounds, Patient, nephrology and Palliative care RN  The patient remains critically ill.  Critical care time = 75 minutes in directly providing and coordinating critical care and extensive data review.  No time overlap and excludes procedures.

## 2018-10-02 NOTE — PROGRESS NOTES
Called donor line. Reference number [id number]. Sarah called.  Information given to Sarah, donation ruled out due to warm ischemic time.

## 2018-10-02 NOTE — PROGRESS NOTES
[facility] Hospitalist Progress Note    Date of Service: 10/2/2018    Chief Complaint  53 y.o. female admitted 2018 with back pain, found to have T11 and L3 fractures, scheduled for kyphoplasty.  Developed persistent hypotension and had to be transferred to the ICU.  History of alcoholic liver disease.    Interval Problem Update  Patient seen and examined today. ICU Care  Care and plan discussed in IDT/Hot rounds.  Lines and assistive devices reviewed.    Patient tolerating treatment and therapies.  All Data, Medication data reviewed.  Case discussed with nursing as available.  Plan of Care reviewed with patient and notified of changes.  10/2 the patient remains critically ill, family is at the bedside to discuss plan of care, the patient remains in multiorgan system failure decision likely to withdraw care after his sister arrives.  Consultants/Specialty  Nephrology and gastroenterology  Intensivist    Disposition  Remain in ICU        Review of Systems   Unable to perform ROS: Intubated      Physical Exam  Laboratory/Imaging   Hemodynamics  Temp (24hrs), Av.6 °C (97.8 °F), Min:36.2 °C (97.1 °F), Max:37.2 °C (99 °F)   Temperature: 36.2 °C (97.2 °F)  Pulse  Av.8  Min: 67  Max: 114 Heart Rate (Monitored): 82  Blood Pressure: 125/71, NIBP: 126/69 CVP (mm Hg): (!) 10 MM HG    Respiratory  Tineo Vent Mode: APVCMV, Rate (breaths/min): 18, PEEP/CPAP: 12, PEEP/CPAP: 12, FiO2: 100, P Peak (PIP): 31, P MEAN: 18   Respiration: 18, Pulse Oximetry: 99 %     Work Of Breathing / Effort: Vented  RUL Breath Sounds: Crackles, RML Breath Sounds: Crackles, RLL Breath Sounds: Diminished, MITCH Breath Sounds: Crackles, LLL Breath Sounds: Diminished    Fluids    Intake/Output Summary (Last 24 hours) at 10/02/18 0751  Last data filed at 10/02/18 0600   Gross per 24 hour   Intake          6275.14 ml   Output              175 ml   Net          6100.14 ml       Nutrition  Orders Placed This Encounter   Procedures   • Diet NPO      Standing Status:   Standing     Number of Occurrences:   1     Order Specific Question:   Type:     Answer:   Now [1]     Order Specific Question:   Restrict to:     Answer:   Strict [1]     Physical Exam   Constitutional: She appears well-developed and well-nourished. She appears lethargic. No distress. She is sedated and intubated.   HENT:   Mouth/Throat: Oropharynx is clear and moist. No oropharyngeal exudate.   Eyes: Conjunctivae are normal. Right eye exhibits no discharge. Left eye exhibits no discharge.   Neck: No JVD present. No tracheal deviation present.   Cardiovascular: Normal rate, regular rhythm and normal heart sounds.    Pulmonary/Chest: Effort normal and breath sounds normal. No stridor. She is intubated. No respiratory distress. She has no wheezes. She has no rales.   Abdominal: Soft. Bowel sounds are normal. She exhibits distension. She exhibits no mass. There is no guarding.   Musculoskeletal: She exhibits no edema.   Neurological: She appears lethargic.   Skin: Skin is warm and dry. She is not diaphoretic. No pallor.   Nursing note and vitals reviewed.      Recent Labs      10/01/18   1642  10/01/18   2200  10/02/18   0400   WBC  17.6*  21.8*  23.4*   RBC  2.36*  2.57*  2.55*   HEMOGLOBIN  7.5*  8.3*  8.5*   HEMATOCRIT  22.0*  23.4*  23.4*   MCV  93.2  91.1  91.8   MCH  31.8  32.3  33.3*   MCHC  34.1  35.5*  36.3*   RDW  55.8*  56.2*  58.6*   PLATELETCT  183  184  162*   MPV  10.2  10.4  10.9     Recent Labs      10/01/18   0945  10/01/18   2200  10/02/18   0400   SODIUM  136  136  136   POTASSIUM  3.4*  2.9*  3.8   CHLORIDE  105  104  105   CO2  14*  17*  17*   GLUCOSE  258*  158*  165*   BUN  21  22  22   CREATININE  1.98*  2.10*  2.07*   CALCIUM  8.4*  8.9  9.1     Recent Labs      09/30/18   0840  10/01/18   0543   APTT  48.6*  59.9*   INR  1.80*  4.87*     Recent Labs      10/01/18   0543   BNPBTYPENAT  1864*     Recent Labs      09/30/18   1935  10/02/18   0400   [facility]  48  203*           Assessment/Plan     * Acute respiratory failure (HCC)   Assessment & Plan    Patient unable to protect airway due to need for use of sedatives specifically ativan to treat alcohol withdrawal.  Discussed with Dr. Landrum  Intubation 9/30; vent settings per intensivist.  Propofol drip for sedation.  RT protocol.        Acute blood loss anemia   Assessment & Plan    Severe, drop overnight to 2.2  GI bleed versus hemolysis, GI consulting  Patient getting 4 units of blood and one of FFP        Hypotension   Assessment & Plan    Worsening, possibly decompensated liver disease, no clear infectious etiology.  On three pressors  Holding all diuretics.  Cover for undiscovered infectious source such as SBP with ceftriaxone. Blood cultures done, ascitis fluid sent for culture.  Hydrocortisone 100mg every 6 hours, recently patient was on oral steroids, cortisol level 7.  Florinef 0.1 mg daily  Albumin 25% infusion every 6 hours.  Prognosis is grim awaiting family, may withdraw care, DNR.            Alcohol withdrawal syndrome with perceptual disturbance (HCC)   Assessment & Plan    CIWA protocol initiated.  Start neurontin 300 mg tid.  Rally bag now, po vitamins in am.  Ativan prn on a sliding scale po or iv as indicated by CIWA score.        Coagulopathy (HCC)   Assessment & Plan    Due to liver disease, inr 1.8 today.  Thrombocytopenia worsening 103.         SHIN (acute kidney injury) (HCC)   Assessment & Plan    Possible ATN due to hypotension versus hepatorenal syndrome  Nephrology following  Creatinine slightly improved 2.07 but oliguric        Alcoholic hepatitis with ascites- (present on admission)   Assessment & Plan    No acute exacerbation, LFT's stable, alcohol level was high, continue close monitoring.   Holding lasix and spironolactone due to hypotension and SHIN  Ammonia level is high continue lactulose  intubated        DNR (do not resuscitate) discussion   Assessment & Plan    Patient's boyfriend  contacted her family, they will be coming to see her as soon as possible, family aware that she is critically ill and may not survive until they get here. Agree to continue present treatment but no escalation, eg no dialysis. Family planning to discuss withdrawal of care once SAL (son) and other family have visited.  Palliative care consult  DNR, due to advanced liver disease chest compressions would not result in patient's comfort or improvement in condition  Advanced care planning discussing with her boyfriend and palliative care, intensivist and RN greater than 30 minutes in addition to usual E&M time.        Compression fracture of vertebra (HCC)   Assessment & Plan    Checking  SPEP and vit D, concern for possible  multiple myeloma,  MRI showed acute T11 and L3 vertebral body kyphoplasty cancelled due to decompensated condition.        Hypothyroidism- (present on admission)   Assessment & Plan    Continue Synthroid.        Hypokalemia   Assessment & Plan    Replacement per ICU protocol.        Hyponatremia- (present on admission)   Assessment & Plan    Due to liver disease, worsening to 131, unable to diurese with hypotension.          Quality-Core Measures   Reviewed items::  Labs reviewed, Medications reviewed and Radiology images reviewed  Rhodes catheter::  Critically Ill - Requiring Accurate Measurement of Urinary Output  DVT prophylaxis pharmacological::  Contraindicated - High bleeding risk  DVT prophylaxis - mechanical:  SCDs      Plan  C/w supportive care for now  Family here at bedside to decide on termination of aggressive efforts and allow natural death  Time spent exceeding 45 minutes in conjunction with consultants

## 2018-10-02 NOTE — CARE PLAN
Problem: Infection  Goal: Will remain free from infection  Standard precautions in place with proper hand hygiene. CLABSI, VAP and CAUTI preventions in place. Abx therapy in place per MAR.     Problem: Respiratory:  Goal: Respiratory status will improve  Collaboration in place with RT. Suctioning patient prn. Continuous pulse ox in place. HOB elevated for optimal positioning of lungs.     Problem: Fluid Volume:  Goal: Will maintain balanced intake and output  Monitoring strict I&O and daily weights. Monitoring hemodynamic status.

## 2018-10-02 NOTE — PROGRESS NOTES
2 RN skin check    Unable to assess back d/t severe hemodynamic instability with turns. No other skin issues noted.

## 2018-10-03 LAB
BACTERIA FLD AEROBE CULT: NORMAL
GRAM STN SPEC: NORMAL
SIGNIFICANT IND 70042: NORMAL
SITE SITE: NORMAL
SOURCE SOURCE: NORMAL

## 2018-10-03 NOTE — PROGRESS NOTES
Tissue reference number [id number].         Two RN pronouncement of death per protocol and MD order. TOD 3751.

## 2018-10-03 NOTE — DISCHARGE SUMMARY
DATE OF ADMISSION:  09/27/2018.    DATE OF DEATH AND EXPIRATION:  10/02/2018.    CAUSE OF DEATH:  1.  Acute cardiopulmonary failure secondary to multiorgan system failure   secondary to advanced liver disease secondary to chronic severe alcohol abuse,   comorbid conditions, acute respiratory failure secondary to acute generalized   decompensation.  2.  Acute blood loss anemia.  3.  Severe abdominal ascites secondary to cirrhosis.  4.  Persistent hypotension and shock, multifactorial with possibility of.  5.  Intraabdominal infection secondary to liver disease and ascites.  6.  Chronic relapsing alcoholism.  7.  Coagulopathy secondary to liver disease.  8.  Acute kidney injury, possibility of hepatorenal syndrome.  9.  Chronic alcoholic hepatitis.  10.  Osteoporosis.  11.  Compression fractures of the vertebrae.  12.  Hypothyroidism.  13.  Hypokalemia and hyponatremia.  14.  History of recent debilitating back pain secondary to compression   fractures.    For presenting symptoms, HPI and physical findings, please refer to the   dictated H and P.    CONSULTATIONS OBTAINED:  1.  Palliative Care.  2.  Dr. Xiomy Ariza, nephrology.  3.  Dr. Bayron Santa, gastroenterology.  4.  Dr. Carlos Perry from pulmonary critical care.    HOSPITAL COURSE:  The patient was admitted with back pain where she apparently   had a compression fracture in the spine, which was felt to possibly be   evidently treated with kyphoplasty.  The patient was admitted, she had   difficulty with alcohol intoxication and encephalopathy secondary to her   significant liver disease.  The patient was found with significant renal   dysfunction, felt to be possibly secondary to developing hepatorenal syndrome.    Patient was encephalopathic with high ammonia level given her underlying   significant liver disease.  She was referred to consultations including   gastroenterology and nephrology.  The patient was attempted to be medically   treated for  worsening liver condition when the patient had an acute   decompensation.  It was felt that the patient would benefit from a   paracentesis.  She developed persistent hypotension and suffered significant   anemia.  She was transferred to ICU and required rapid intubation on placement   on IV pressors secondary to continued hypotension.  The patient was volume   resuscitated, transfused.  She had consistent and persistent encephalopathy   with underlying hepatic encephalopathy, possibly also suffering from blood   pressure related mental status changes.  The patient overall declined   gradually and developed increasing multiorgan system failure with respiratory   failure, ARDS type pulmonary texture with continued hypotension failing liver   and kidney.  The patient was treated conservatively and the family meeting was   held and it was felt that patient would not want aggressive treatment in this   current aggressive fashion.  She likely would have an overall limited outcome   even given best case scenario at this time and the patient was felt to   benefit from an overall withdrawal of aggressive care and to allow natural   death, which was honored and the patient  with family at the bedside.    For full further details, please refer to the computer system and paper chart.    Time spent on the patient's last day of treatment in the hospital is in excess   of 55 minutes.       ____________________________________     MD SOLANGE SHIN / CHIDI    DD:  10/02/2018 21:33:03  DT:  10/03/2018 02:04:43    D#:  [id number]  Job#:  [id number]

## 2018-10-03 NOTE — PROGRESS NOTES
Called [facility] to notify of time of death. Reports reference number [id number] does not exist in his system.

## 2018-10-04 LAB
BACTERIA BLD CULT: NORMAL
BACTERIA BLD CULT: NORMAL
SIGNIFICANT IND 70042: NORMAL
SIGNIFICANT IND 70042: NORMAL
SITE SITE: NORMAL
SITE SITE: NORMAL
SOURCE SOURCE: NORMAL
SOURCE SOURCE: NORMAL

## 2023-01-31 NOTE — CARE PLAN
Problem: Communication  Goal: The ability to communicate needs accurately and effectively will improve  Outcome: PROGRESSING AS EXPECTED      Problem: Safety  Goal: Will remain free from injury  Outcome: PROGRESSING AS EXPECTED    Goal: Will remain free from falls  Outcome: PROGRESSING AS EXPECTED      Problem: Pain Management  Goal: Pain level will decrease to patient's comfort goal  Outcome: PROGRESSING AS EXPECTED         No

## 2024-07-16 NOTE — PROGRESS NOTES
Pt. Presented with her Mom for filling on # B (DO)   Reviewing [facility] and existing x-rays  ASA : I  Pain Level : 0    Anesthesia : topical benzocaine followed by one and a half carpule of Lido 2% 1/100 K via local infiltration.  Anesthesia time observed.    Pt. Complained of pain when the dentist touch the tooth# B . No drilling don.    Situation explained to the pt's mother and referral to pediatric dentistry given.    NV : Follow up after pediatric dentistry done.           [facility] Hospitalist Progress Note    Date of Service: 3/22/2018    Chief Complaint  52 y.o. Female with PMH alcoholic liver disease admitted 3/20/2018 with desire for detox, hypotension and markedly abnormal LFTs. She says she was urged to stop drinking by her significant other, she went to [facility] and when she was there she was noted to be hypotensive as well as jaundiced, they sent her here for further evaluation. CT of the abdomen was done to evaluate for cirrhosis there was incidental finding of a right ovarian cystic mass, ultrasound was ordered.    Interval Problem Update  Requesting GYN consultation-they have not seen her yet  Stool for occult blood has not been submitted  Hemoglobin slightly lower but not markedly so  LFTs improved  Hypotension unchanged-midodrine increased  Discussed with nurse  No signs of withdrawal as yet  I again reminded her of the consequences of continued alcohol use    Consultants/Specialty  None    Disposition  Anticipate home        Review of Systems   Constitutional: Negative for chills and fever.   Eyes: Negative for blurred vision.   Respiratory: Negative for cough and shortness of breath.    Cardiovascular: Negative for chest pain and palpitations.   Gastrointestinal: Negative for abdominal pain, nausea and vomiting.   Genitourinary: Negative for dysuria.        Urine dark   Musculoskeletal: Negative for joint pain and myalgias.   Neurological: Positive for dizziness. Negative for headaches.   Psychiatric/Behavioral: Positive for substance abuse. Negative for depression.      Physical Exam  Laboratory/Imaging   Hemodynamics  Temp (24hrs), Av.7 °C (98.1 °F), Min:36.3 °C (97.3 °F), Max:37.3 °C (99.2 °F)   Temperature: 36.3 °C (97.3 °F)  Pulse  Av.5  Min: 84  Max: 108    Blood Pressure: (!) 87/59      Respiratory      Respiration: 18, Pulse Oximetry: 92 %        RUL Breath Sounds: Clear, RML Breath Sounds: Clear, RLL Breath Sounds: Diminished, MITCH Breath Sounds: Clear,  LLL Breath Sounds: Diminished    Fluids    Intake/Output Summary (Last 24 hours) at 03/22/18 1726  Last data filed at 03/22/18 0700   Gross per 24 hour   Intake             3560 ml   Output             2300 ml   Net             1260 ml       Nutrition  Orders Placed This Encounter   Procedures   • Diet Order     Standing Status:   Standing     Number of Occurrences:   1     Order Specific Question:   Diet:     Answer:   Regular [1]     Physical Exam   Constitutional: She is oriented to person, place, and time. She appears well-developed. No distress.          HENT:   Head: Normocephalic and atraumatic.   Mouth/Throat: Oropharynx is clear and moist.   Fetid breath   Eyes: Conjunctivae and EOM are normal. Pupils are equal, round, and reactive to light. Right eye exhibits no discharge. Left eye exhibits no discharge. Scleral icterus is present.   Neck: Neck supple.   Cardiovascular: Normal rate and regular rhythm.    Pulmonary/Chest: Effort normal and breath sounds normal.   Abdominal: Soft. Bowel sounds are normal. She exhibits no distension and no mass. There is no tenderness. There is no rebound and no guarding.   Musculoskeletal: She exhibits no edema or tenderness.   Neurological: She is alert and oriented to person, place, and time.   No tremor or asterixis   Skin: Skin is warm and dry. She is not diaphoretic. No erythema.   Jaundice     Psychiatric: She has a normal mood and affect.   Nursing note and vitals reviewed.      Recent Labs      03/20/18   1039  03/21/18   0321  03/22/18   0335   WBC  5.9  4.7*  5.4   RBC  3.63*  3.19*  3.03*   HEMOGLOBIN  12.9  11.5*  10.9*   HEMATOCRIT  35.6*  31.6*  29.9*   MCV  98.1*  99.1*  98.7*   MCH  35.5*  36.1*  36.0*   MCHC  36.2*  36.4*  36.5*   RDW  50.3*  51.0*  52.6*   PLATELETCT  76*  58*  90*   MPV  12.9  12.7  12.1     Recent Labs      03/20/18   1039  03/21/18   0321  03/22/18   0335   SODIUM  126*  129*  135   POTASSIUM  4.1  3.0*  3.7   CHLORIDE  78*  90*  101    CO2  31  27  25   GLUCOSE  100*  102*  91   BUN  31*  18  7*   CREATININE  1.09  0.76  0.50   CALCIUM  9.3  9.0  8.8     Recent Labs      03/20/18   1039   APTT  33.9   INR  1.21*                  Assessment/Plan     * Hypotension- (present on admission)   Assessment & Plan    Unchanged-  Increase midodrine        Alcoholic cirrhosis (CMS-HCC)- (present on admission)   Assessment & Plan    Alcoholic hepatitis  Maddrey score 13 on admit- steroids unlikely to benefit  Meld score 27 (~ 50%  3mo mortality)        Hyponatremia- (present on admission)   Assessment & Plan    Resolved        Melena   Assessment & Plan    Patient denies overtly tarry stools but says her stools are dark  Monitoring CBC  Stool occult blood pending        Ovarian mass, right- (present on admission)   Assessment & Plan    Complex cystic mass R ovary   elevated  GYN /onc consulted        Coagulopathy (CMS-HCC)- (present on admission)   Assessment & Plan    mild        Thrombocytopenia (CMS-HCC)- (present on admission)   Assessment & Plan    2/2 EtOH and liver dz        Alcoholism (CMS-HCC)- (present on admission)   Assessment & Plan    Last use 24 hours prior to admission  Has history of severe alcohol withdrawal  CIWA protocol  She is only 48 hours post cessation        Macrocytic anemia- (present on admission)   Assessment & Plan    Likely 2/2 liver disease and EtOH-  Recent folate borderline low- supplementing PO          Quality-Core Measures   Reviewed items::  Labs reviewed and Medications reviewed  Rhodes catheter::  No Rhodes  DVT prophylaxis pharmacological::  Contraindicated - High bleeding risk  DVT prophylaxis - mechanical:  SCDs  Assessed for rehabilitation services:  Patient returned to prior level of function, rehabilitation not indicated at this time